# Patient Record
Sex: MALE | Race: WHITE | NOT HISPANIC OR LATINO | Employment: FULL TIME | URBAN - METROPOLITAN AREA
[De-identification: names, ages, dates, MRNs, and addresses within clinical notes are randomized per-mention and may not be internally consistent; named-entity substitution may affect disease eponyms.]

---

## 2017-01-05 ENCOUNTER — HOSPITAL ENCOUNTER (OUTPATIENT)
Dept: ONCOLOGY | Facility: CLINIC | Age: 32
Discharge: HOME | End: 2017-01-05
Attending: INTERNAL MEDICINE | Admitting: INTERNAL MEDICINE

## 2017-01-09 ENCOUNTER — HOSPITAL ENCOUNTER (OUTPATIENT)
Dept: ONCOLOGY | Facility: CLINIC | Age: 32
Discharge: HOME | End: 2017-01-09
Attending: INTERNAL MEDICINE | Admitting: INTERNAL MEDICINE

## 2017-03-17 ENCOUNTER — HOSPITAL ENCOUNTER (OUTPATIENT)
Dept: ONCOLOGY | Facility: CLINIC | Age: 32
Discharge: HOME | End: 2017-03-17
Attending: INTERNAL MEDICINE | Admitting: INTERNAL MEDICINE

## 2017-03-31 ENCOUNTER — ON CAMPUS - OUTPATIENT (AMBULATORY)
Dept: URBAN - METROPOLITAN AREA HOSPITAL 2 | Facility: HOSPITAL | Age: 32
End: 2017-03-31

## 2017-03-31 ENCOUNTER — OFFICE (AMBULATORY)
Dept: URBAN - METROPOLITAN AREA CLINIC 64 | Facility: CLINIC | Age: 32
End: 2017-03-31

## 2017-03-31 VITALS
DIASTOLIC BLOOD PRESSURE: 83 MMHG | DIASTOLIC BLOOD PRESSURE: 79 MMHG | RESPIRATION RATE: 16 BRPM | WEIGHT: 230 LBS | HEART RATE: 82 BPM | OXYGEN SATURATION: 99 % | SYSTOLIC BLOOD PRESSURE: 100 MMHG | SYSTOLIC BLOOD PRESSURE: 125 MMHG | DIASTOLIC BLOOD PRESSURE: 80 MMHG | OXYGEN SATURATION: 94 % | RESPIRATION RATE: 15 BRPM | HEART RATE: 93 BPM | SYSTOLIC BLOOD PRESSURE: 113 MMHG | SYSTOLIC BLOOD PRESSURE: 110 MMHG | DIASTOLIC BLOOD PRESSURE: 73 MMHG | HEART RATE: 88 BPM | HEART RATE: 78 BPM | OXYGEN SATURATION: 93 % | HEIGHT: 74 IN | SYSTOLIC BLOOD PRESSURE: 116 MMHG | HEART RATE: 97 BPM | HEART RATE: 102 BPM | DIASTOLIC BLOOD PRESSURE: 85 MMHG | SYSTOLIC BLOOD PRESSURE: 108 MMHG | RESPIRATION RATE: 18 BRPM | HEART RATE: 101 BPM | DIASTOLIC BLOOD PRESSURE: 68 MMHG | OXYGEN SATURATION: 95 % | OXYGEN SATURATION: 98 % | OXYGEN SATURATION: 96 %

## 2017-03-31 DIAGNOSIS — D12.4 BENIGN NEOPLASM OF DESCENDING COLON: ICD-10-CM

## 2017-03-31 DIAGNOSIS — R10.32 LEFT LOWER QUADRANT PAIN: ICD-10-CM

## 2017-03-31 DIAGNOSIS — K63.5 POLYP OF COLON: ICD-10-CM

## 2017-03-31 DIAGNOSIS — K59.1 FUNCTIONAL DIARRHEA: ICD-10-CM

## 2017-03-31 DIAGNOSIS — K64.8 OTHER HEMORRHOIDS: ICD-10-CM

## 2017-03-31 PROBLEM — D12.5 BENIGN NEOPLASM OF SIGMOID COLON: Status: ACTIVE | Noted: 2017-03-31

## 2017-03-31 LAB
GI HISTOLOGY: A. UNSPECIFIED: (no result)
GI HISTOLOGY: B. UNSPECIFIED: (no result)
GI HISTOLOGY: C. UNSPECIFIED: (no result)
GI HISTOLOGY: PDF REPORT: (no result)

## 2017-03-31 PROCEDURE — 45385 COLONOSCOPY W/LESION REMOVAL: CPT | Performed by: INTERNAL MEDICINE

## 2017-03-31 PROCEDURE — 88305 TISSUE EXAM BY PATHOLOGIST: CPT | Performed by: INTERNAL MEDICINE

## 2017-03-31 PROCEDURE — 45380 COLONOSCOPY AND BIOPSY: CPT | Mod: 59 | Performed by: INTERNAL MEDICINE

## 2017-03-31 RX ADMIN — PROPOFOL: 10 INJECTION, EMULSION INTRAVENOUS at 13:17

## 2017-05-12 ENCOUNTER — HOSPITAL ENCOUNTER (OUTPATIENT)
Dept: ONCOLOGY | Facility: CLINIC | Age: 32
Discharge: HOME | End: 2017-05-12
Attending: INTERNAL MEDICINE | Admitting: INTERNAL MEDICINE

## 2017-08-10 ENCOUNTER — HOSPITAL ENCOUNTER (OUTPATIENT)
Dept: OTHER | Facility: HOSPITAL | Age: 32
Setting detail: SPECIMEN
Discharge: HOME | End: 2017-08-10
Attending: FAMILY MEDICINE | Admitting: FAMILY MEDICINE

## 2017-08-11 LAB
HIV1+2 AB SERPL QL IA: NORMAL
HSV1 IGG SER IA-ACNC: NEGATIVE

## 2017-09-08 ENCOUNTER — OFFICE (AMBULATORY)
Dept: URBAN - METROPOLITAN AREA CLINIC 64 | Facility: CLINIC | Age: 32
End: 2017-09-08

## 2017-09-08 VITALS
DIASTOLIC BLOOD PRESSURE: 97 MMHG | HEIGHT: 74 IN | HEART RATE: 86 BPM | SYSTOLIC BLOOD PRESSURE: 138 MMHG | WEIGHT: 240 LBS

## 2017-09-08 DIAGNOSIS — R10.12 LEFT UPPER QUADRANT PAIN: ICD-10-CM

## 2017-09-08 DIAGNOSIS — R19.4 CHANGE IN BOWEL HABIT: ICD-10-CM

## 2017-09-08 DIAGNOSIS — K21.9 GASTRO-ESOPHAGEAL REFLUX DISEASE WITHOUT ESOPHAGITIS: ICD-10-CM

## 2017-09-08 LAB
AMYLASE, SERUM: 46 U/L (ref 31–124)
COMP. METABOLIC PANEL (14): A/G RATIO: 1.3 (ref 1.2–2.2)
COMP. METABOLIC PANEL (14): ALBUMIN, SERUM: 4.6 G/DL (ref 3.5–5.5)
COMP. METABOLIC PANEL (14): ALKALINE PHOSPHATASE, S: 90 IU/L (ref 39–117)
COMP. METABOLIC PANEL (14): ALT (SGPT): 89 IU/L — HIGH (ref 0–44)
COMP. METABOLIC PANEL (14): AST (SGOT): 62 IU/L — HIGH (ref 0–40)
COMP. METABOLIC PANEL (14): BILIRUBIN, TOTAL: 0.8 MG/DL (ref 0–1.2)
COMP. METABOLIC PANEL (14): BUN/CREATININE RATIO: 14 (ref 9–20)
COMP. METABOLIC PANEL (14): BUN: 13 MG/DL (ref 6–20)
COMP. METABOLIC PANEL (14): CALCIUM, SERUM: 9.7 MG/DL (ref 8.7–10.2)
COMP. METABOLIC PANEL (14): CARBON DIOXIDE, TOTAL: 20 MMOL/L (ref 18–29)
COMP. METABOLIC PANEL (14): CHLORIDE, SERUM: 99 MMOL/L (ref 96–106)
COMP. METABOLIC PANEL (14): CREATININE, SERUM: 0.91 MG/DL (ref 0.76–1.27)
COMP. METABOLIC PANEL (14): EGFR IF AFRICN AM: 128 ML/MIN/1.73 (ref 59–?)
COMP. METABOLIC PANEL (14): EGFR IF NONAFRICN AM: 111 ML/MIN/1.73 (ref 59–?)
COMP. METABOLIC PANEL (14): GLOBULIN, TOTAL: 3.5 G/DL (ref 1.5–4.5)
COMP. METABOLIC PANEL (14): GLUCOSE, SERUM: 107 MG/DL — HIGH (ref 65–99)
COMP. METABOLIC PANEL (14): POTASSIUM, SERUM: 4.3 MMOL/L (ref 3.5–5.2)
COMP. METABOLIC PANEL (14): PROTEIN, TOTAL, SERUM: 8.1 G/DL (ref 6–8.5)
COMP. METABOLIC PANEL (14): SODIUM, SERUM: 141 MMOL/L (ref 134–144)
LIPASE, SERUM: 37 U/L (ref 0–59)

## 2017-09-08 PROCEDURE — 99214 OFFICE O/P EST MOD 30 MIN: CPT | Performed by: INTERNAL MEDICINE

## 2017-09-08 RX ORDER — OMEPRAZOLE 40 MG/1
CAPSULE, DELAYED RELEASE ORAL
Qty: 90 | Refills: 3 | Status: COMPLETED
End: 2023-08-22

## 2017-09-15 ENCOUNTER — CLINICAL SUPPORT (OUTPATIENT)
Dept: ONCOLOGY | Facility: HOSPITAL | Age: 32
End: 2017-09-15

## 2017-09-15 ENCOUNTER — HOSPITAL ENCOUNTER (OUTPATIENT)
Dept: ONCOLOGY | Facility: CLINIC | Age: 32
Setting detail: INFUSION SERIES
Discharge: HOME | End: 2017-09-15
Attending: INTERNAL MEDICINE | Admitting: INTERNAL MEDICINE

## 2017-09-15 ENCOUNTER — HOSPITAL ENCOUNTER (OUTPATIENT)
Dept: ONCOLOGY | Facility: HOSPITAL | Age: 32
Discharge: HOME | End: 2017-09-15
Attending: INTERNAL MEDICINE | Admitting: INTERNAL MEDICINE

## 2017-09-15 NOTE — PROGRESS NOTES
PATIENTS ONCOLOGY RECORD LOCATED IN [facility]      Subjective     Name:  SHEBA NIXON     Date:  09/15/2017  Address:  [address] [place] [address]  Home: [phone]  :  1985 AGE:  32 y.o.        RECORDS OBTAINED:  Patients Oncology Record is located in [facility] [[email or web address]]

## 2017-12-06 ENCOUNTER — HOSPITAL ENCOUNTER (OUTPATIENT)
Dept: PREOP | Facility: HOSPITAL | Age: 32
Setting detail: HOSPITAL OUTPATIENT SURGERY
Discharge: HOME | End: 2017-12-06
Attending: INTERNAL MEDICINE | Admitting: INTERNAL MEDICINE

## 2017-12-06 ENCOUNTER — ON CAMPUS - OUTPATIENT (AMBULATORY)
Dept: URBAN - METROPOLITAN AREA HOSPITAL 85 | Facility: HOSPITAL | Age: 32
End: 2017-12-06
Payer: COMMERCIAL

## 2017-12-06 DIAGNOSIS — R10.12 LEFT UPPER QUADRANT PAIN: ICD-10-CM

## 2017-12-06 DIAGNOSIS — K22.70 BARRETT'S ESOPHAGUS WITHOUT DYSPLASIA: ICD-10-CM

## 2017-12-06 DIAGNOSIS — K29.60 OTHER GASTRITIS WITHOUT BLEEDING: ICD-10-CM

## 2017-12-06 PROCEDURE — 43239 EGD BIOPSY SINGLE/MULTIPLE: CPT | Performed by: INTERNAL MEDICINE

## 2017-12-11 ENCOUNTER — OFFICE (AMBULATORY)
Dept: URBAN - METROPOLITAN AREA CLINIC 64 | Facility: CLINIC | Age: 32
End: 2017-12-11
Payer: COMMERCIAL

## 2017-12-11 VITALS
HEART RATE: 80 BPM | DIASTOLIC BLOOD PRESSURE: 86 MMHG | WEIGHT: 242 LBS | SYSTOLIC BLOOD PRESSURE: 132 MMHG | HEIGHT: 74 IN

## 2017-12-11 DIAGNOSIS — R94.5 ABNORMAL RESULTS OF LIVER FUNCTION STUDIES: ICD-10-CM

## 2017-12-11 DIAGNOSIS — K22.70 BARRETT'S ESOPHAGUS WITHOUT DYSPLASIA: ICD-10-CM

## 2017-12-11 LAB
ACTIN (SMOOTH MUSCLE) ANTIBODY: 7 UNITS (ref 0–19)
ALPHA-1-ANTITRYPSIN PHENOTYP: ALPHA-1-ANTITRYPSIN, SERUM: 148 MG/DL (ref 90–200)
ALPHA-1-ANTITRYPSIN PHENOTYP: PHENOTYPE (PI): (no result)
ANTINUCLEAR ANTIBODIES, IFA: NEGATIVE
CERULOPLASMIN: 26.5 MG/DL (ref 16–31)
FERRITIN, SERUM: 43 NG/ML (ref 30–400)
HEPATITIS PANEL (4): HBSAG SCREEN: NEGATIVE
HEPATITIS PANEL (4): HEP A AB, IGM: NEGATIVE
HEPATITIS PANEL (4): HEP B CORE AB, IGM: NEGATIVE
HEPATITIS PANEL (4): HEP C VIRUS AB: <0.1 S/CO RATIO
IRON AND TIBC: IRON BIND.CAP.(TIBC): 289 UG/DL (ref 250–450)
IRON AND TIBC: IRON SATURATION: 33 % (ref 15–55)
IRON AND TIBC: IRON, SERUM: 94 UG/DL (ref 38–169)
IRON AND TIBC: UIBC: 195 UG/DL (ref 111–343)
MITOCHONDRIAL (M2) ANTIBODY: <20 UNITS

## 2017-12-11 PROCEDURE — 99214 OFFICE O/P EST MOD 30 MIN: CPT | Performed by: INTERNAL MEDICINE

## 2018-02-14 ENCOUNTER — HOSPITAL ENCOUNTER (OUTPATIENT)
Dept: FAMILY MEDICINE CLINIC | Facility: CLINIC | Age: 33
Setting detail: SPECIMEN
Discharge: HOME | End: 2018-02-14
Attending: FAMILY MEDICINE | Admitting: FAMILY MEDICINE

## 2018-02-14 LAB
ALBUMIN SERPL-MCNC: 4.3 G/DL (ref 3.5–4.8)
ALBUMIN/GLOB SERPL: 1 {RATIO} (ref 1–1.7)
ALP SERPL-CCNC: 67 IU/L (ref 32–91)
ALT SERPL-CCNC: 93 IU/L (ref 17–63)
ANION GAP SERPL CALC-SCNC: 10 MMOL/L (ref 10–20)
AST SERPL-CCNC: 50 IU/L (ref 15–41)
BASOPHILS # BLD AUTO: 0 10*3/UL (ref 0–0.2)
BASOPHILS NFR BLD AUTO: 1 % (ref 0–2)
BILIRUB SERPL-MCNC: 0.8 MG/DL (ref 0.3–1.2)
BUN SERPL-MCNC: 9 MG/DL (ref 8–20)
BUN/CREAT SERPL: 10 (ref 6.2–20.3)
CALCIUM SERPL-MCNC: 9.5 MG/DL (ref 8.9–10.3)
CHLORIDE SERPL-SCNC: 103 MMOL/L (ref 101–111)
CHOLEST SERPL-MCNC: 164 MG/DL
CHOLEST/HDLC SERPL: 5 {RATIO}
CONV CO2: 26 MMOL/L (ref 22–32)
CONV LDL CHOLESTEROL DIRECT: 96 MG/DL (ref 0–100)
CONV TOTAL PROTEIN: 8.6 G/DL (ref 6.1–7.9)
CREAT UR-MCNC: 0.9 MG/DL (ref 0.7–1.2)
DIFFERENTIAL METHOD BLD: (no result)
EOSINOPHIL # BLD AUTO: 0.1 10*3/UL (ref 0–0.3)
EOSINOPHIL # BLD AUTO: 1 % (ref 0–3)
ERYTHROCYTE [DISTWIDTH] IN BLOOD BY AUTOMATED COUNT: 13.8 % (ref 11.5–14.5)
GLOBULIN UR ELPH-MCNC: 4.3 G/DL (ref 2.5–3.8)
GLUCOSE SERPL-MCNC: 73 MG/DL (ref 65–99)
HCT VFR BLD AUTO: 47.2 % (ref 40–54)
HDLC SERPL-MCNC: 33 MG/DL
HGB BLD-MCNC: 16.5 G/DL (ref 14–18)
LDLC/HDLC SERPL: 2.9 {RATIO}
LIPID INTERPRETATION: ABNORMAL
LYMPHOCYTES # BLD AUTO: 2.6 10*3/UL (ref 0.8–4.8)
LYMPHOCYTES NFR BLD AUTO: 28 % (ref 18–42)
MCH RBC QN AUTO: 34.1 PG (ref 26–32)
MCHC RBC AUTO-ENTMCNC: 35 G/DL (ref 32–36)
MCV RBC AUTO: 97.4 FL (ref 80–94)
MONOCYTES # BLD AUTO: 0.9 10*3/UL (ref 0.1–1.3)
MONOCYTES NFR BLD AUTO: 9 % (ref 2–11)
NEUTROPHILS # BLD AUTO: 5.6 10*3/UL (ref 2.3–8.6)
NEUTROPHILS NFR BLD AUTO: 61 % (ref 50–75)
NRBC BLD AUTO-RTO: 0 /100{WBCS}
NRBC/RBC NFR BLD MANUAL: 0 10*3/UL
PLATELET # BLD AUTO: 207 10*3/UL (ref 150–450)
PMV BLD AUTO: 9.8 FL (ref 7.4–10.4)
POTASSIUM SERPL-SCNC: 4 MMOL/L (ref 3.6–5.1)
RBC # BLD AUTO: 4.84 10*6/UL (ref 4.6–6)
SODIUM SERPL-SCNC: 135 MMOL/L (ref 136–144)
TRIGL SERPL-MCNC: 270 MG/DL
VLDLC SERPL CALC-MCNC: 35.6 MG/DL
WBC # BLD AUTO: 9.2 10*3/UL (ref 4.5–11.5)

## 2018-02-23 ENCOUNTER — OFFICE (AMBULATORY)
Dept: URBAN - METROPOLITAN AREA LAB 2 | Facility: LAB | Age: 33
End: 2018-02-23

## 2018-02-23 ENCOUNTER — OFFICE (AMBULATORY)
Dept: URBAN - METROPOLITAN AREA CLINIC 64 | Facility: CLINIC | Age: 33
End: 2018-02-23

## 2018-02-23 VITALS
SYSTOLIC BLOOD PRESSURE: 135 MMHG | HEART RATE: 94 BPM | WEIGHT: 245 LBS | HEIGHT: 74 IN | DIASTOLIC BLOOD PRESSURE: 88 MMHG

## 2018-02-23 DIAGNOSIS — K59.1 FUNCTIONAL DIARRHEA: ICD-10-CM

## 2018-02-23 DIAGNOSIS — R94.5 ABNORMAL RESULTS OF LIVER FUNCTION STUDIES: ICD-10-CM

## 2018-02-23 DIAGNOSIS — R19.7 DIARRHEA, UNSPECIFIED: ICD-10-CM

## 2018-02-23 DIAGNOSIS — R10.12 LEFT UPPER QUADRANT PAIN: ICD-10-CM

## 2018-02-23 DIAGNOSIS — A04.0 ENTEROPATHOGENIC ESCHERICHIA COLI INFECTION: ICD-10-CM

## 2018-02-23 LAB
HEPATIC FUNCTION PANEL (7): ALBUMIN, SERUM: 4.3 G/DL (ref 3.5–5.5)
HEPATIC FUNCTION PANEL (7): ALKALINE PHOSPHATASE, S: 73 IU/L (ref 39–117)
HEPATIC FUNCTION PANEL (7): ALT (SGPT): 72 IU/L — HIGH (ref 0–44)
HEPATIC FUNCTION PANEL (7): AST (SGOT): 33 IU/L (ref 0–40)
HEPATIC FUNCTION PANEL (7): BILIRUBIN, DIRECT: 0.19 MG/DL (ref 0–0.4)
HEPATIC FUNCTION PANEL (7): BILIRUBIN, TOTAL: 0.6 MG/DL (ref 0–1.2)
HEPATIC FUNCTION PANEL (7): PROTEIN, TOTAL, SERUM: 8 G/DL (ref 6–8.5)

## 2018-02-23 PROCEDURE — 87507 IADNA-DNA/RNA PROBE TQ 12-25: CPT | Performed by: INTERNAL MEDICINE

## 2018-02-23 PROCEDURE — 99214 OFFICE O/P EST MOD 30 MIN: CPT | Performed by: INTERNAL MEDICINE

## 2018-02-23 RX ORDER — HYOSCYAMINE SULFATE 0.12 MG/1
0.5 TABLET ORAL; SUBLINGUAL
Qty: 30 | Refills: 5 | Status: COMPLETED
Start: 2018-02-23 | End: 2018-08-13

## 2018-03-12 ENCOUNTER — HOSPITAL ENCOUNTER (OUTPATIENT)
Dept: CT IMAGING | Facility: HOSPITAL | Age: 33
Discharge: HOME | End: 2018-03-12
Attending: FAMILY MEDICINE | Admitting: FAMILY MEDICINE

## 2018-03-21 ENCOUNTER — HOSPITAL ENCOUNTER (OUTPATIENT)
Dept: MRI IMAGING | Facility: HOSPITAL | Age: 33
Discharge: HOME | End: 2018-03-21
Attending: NEUROLOGICAL SURGERY | Admitting: NEUROLOGICAL SURGERY

## 2018-05-16 ENCOUNTER — OFFICE (AMBULATORY)
Dept: URBAN - METROPOLITAN AREA CLINIC 64 | Facility: CLINIC | Age: 33
End: 2018-05-16

## 2018-05-16 VITALS
HEIGHT: 74 IN | HEART RATE: 95 BPM | WEIGHT: 238 LBS | DIASTOLIC BLOOD PRESSURE: 74 MMHG | SYSTOLIC BLOOD PRESSURE: 120 MMHG

## 2018-05-16 DIAGNOSIS — K59.1 FUNCTIONAL DIARRHEA: ICD-10-CM

## 2018-05-16 DIAGNOSIS — R94.5 ABNORMAL RESULTS OF LIVER FUNCTION STUDIES: ICD-10-CM

## 2018-05-16 PROCEDURE — 99214 OFFICE O/P EST MOD 30 MIN: CPT | Performed by: INTERNAL MEDICINE

## 2018-05-16 RX ORDER — COLESTIPOL HYDROCHLORIDE 1 G/1
TABLET, FILM COATED ORAL
Qty: 180 | Refills: 3 | Status: COMPLETED
End: 2022-06-27

## 2018-05-17 ENCOUNTER — HOSPITAL ENCOUNTER (OUTPATIENT)
Dept: ONCOLOGY | Facility: CLINIC | Age: 33
Setting detail: INFUSION SERIES
Discharge: HOME | End: 2018-05-17
Attending: INTERNAL MEDICINE | Admitting: INTERNAL MEDICINE

## 2018-05-17 ENCOUNTER — CLINICAL SUPPORT (OUTPATIENT)
Dept: ONCOLOGY | Facility: HOSPITAL | Age: 33
End: 2018-05-17

## 2018-05-17 NOTE — PROGRESS NOTES
PATIENTS ONCOLOGY RECORD LOCATED IN [facility]      Subjective     Name:  SHEBA NIXON     Date:  2018  Address:  [address] [place] [address]  Home: [phone]  :  1985 AGE:  33 y.o.        RECORDS OBTAINED:  Patients Oncology Record is located in [facility]

## 2018-08-13 ENCOUNTER — OFFICE (AMBULATORY)
Dept: URBAN - METROPOLITAN AREA CLINIC 64 | Facility: CLINIC | Age: 33
End: 2018-08-13

## 2018-08-13 VITALS
HEART RATE: 102 BPM | HEIGHT: 74 IN | SYSTOLIC BLOOD PRESSURE: 122 MMHG | DIASTOLIC BLOOD PRESSURE: 90 MMHG | WEIGHT: 236 LBS

## 2018-08-13 DIAGNOSIS — R94.5 ABNORMAL RESULTS OF LIVER FUNCTION STUDIES: ICD-10-CM

## 2018-08-13 DIAGNOSIS — K59.1 FUNCTIONAL DIARRHEA: ICD-10-CM

## 2018-08-13 PROCEDURE — 99213 OFFICE O/P EST LOW 20 MIN: CPT | Performed by: INTERNAL MEDICINE

## 2018-08-13 NOTE — SERVICEHPINOTES
This is a 34 y/o WM who presents for f/u.He was noted to have abnormal LFT on routine lab work. Serologies were neg, including viral hep panel. H/o ETOH at times. No family h/o liver disease. He takes meds for HA, has migraines.  He got Hepatitis A vaccine.  NO RUQ pain, no jaundice. He has a h/o diarrhea. Biofire showed +EPEC E.coli. He was prescribed a course of azithromycin for the E.coli and reports improvement (Levaquin allergy). He reports alternating constipation and diarrhea.  He can't find any specific pattern or trigger. He has tried probiotics which didn't help. He has also tried cholestyramine which didn't help, so he quit taking it. Colestipol was added last ov. He reports diarrhea has improved since starting colestipol. He has only had one episode of diarrhea in 3 months.  No recent constipation. No blood in stool. Labs:BR5/18 - Celiac panel neg.  Repeat viral hep panel sent in error LFT's were ordered.  Viral hep neg. BR2/18 - ALT 72 otw normal LFTBR2/18 - Biofire +EPEC E.myupJG63/17 - hepatic serologies neg. BR9/17 - AST 62, ALT 89, otw normal CMP, zoila/lipaseRUQ USN 9/17 - normalEGD 12/217 - 3cm BE non dysplastic, antral gastritis neg h.pylori. Recall 2020.BRColonoscopy 3/17 - 2 polyps (TA, HP), normal to TI, int hems. Random colon bx's neg microscopic colitis. Recall colon 2022.

## 2018-10-02 ENCOUNTER — HOSPITAL ENCOUNTER (OUTPATIENT)
Dept: MRI IMAGING | Facility: HOSPITAL | Age: 33
Discharge: HOME | End: 2018-10-02
Attending: PHYSICIAN ASSISTANT | Admitting: PHYSICIAN ASSISTANT

## 2018-11-26 PROBLEM — R10.32 ABDOMINAL PAIN - LLQ: Status: ACTIVE | Noted: 2017-03-31

## 2019-04-09 ENCOUNTER — HOSPITAL ENCOUNTER (OUTPATIENT)
Dept: MRI IMAGING | Facility: HOSPITAL | Age: 34
Discharge: HOME | End: 2019-04-09
Attending: NEUROLOGICAL SURGERY | Admitting: NEUROLOGICAL SURGERY

## 2019-04-25 ENCOUNTER — HOSPITAL ENCOUNTER (OUTPATIENT)
Dept: FAMILY MEDICINE CLINIC | Facility: CLINIC | Age: 34
Setting detail: SPECIMEN
Discharge: HOME | End: 2019-04-25
Attending: PHYSICIAN ASSISTANT | Admitting: PHYSICIAN ASSISTANT

## 2019-04-25 LAB
CHOLEST SERPL-MCNC: 223 MG/DL
CHOLEST/HDLC SERPL: 6.8 {RATIO}
CONV LDL CHOLESTEROL DIRECT: 153 MG/DL (ref 0–100)
GLUCOSE SERPL-MCNC: 100 MG/DL (ref 65–99)
HDLC SERPL-MCNC: 33 MG/DL
LDLC/HDLC SERPL: 4.7 {RATIO}
LIPID INTERPRETATION: ABNORMAL
TRIGL SERPL-MCNC: 218 MG/DL
VLDLC SERPL CALC-MCNC: 37.5 MG/DL

## 2019-06-13 ENCOUNTER — HOSPITAL ENCOUNTER (OUTPATIENT)
Dept: LAB | Facility: HOSPITAL | Age: 34
Setting detail: SPECIMEN
Discharge: HOME | End: 2019-06-13
Attending: INTERNAL MEDICINE | Admitting: INTERNAL MEDICINE

## 2019-06-13 LAB
ALBUMIN SERPL-MCNC: 4.4 G/DL (ref 3.5–4.8)
ALBUMIN/GLOB SERPL: 1.1 {RATIO} (ref 1–1.7)
ALP SERPL-CCNC: 60 IU/L (ref 32–91)
ALT SERPL-CCNC: 46 IU/L (ref 17–63)
ANION GAP SERPL CALC-SCNC: 12.6 MMOL/L (ref 10–20)
AST SERPL-CCNC: 33 IU/L (ref 15–41)
BASOPHILS # BLD AUTO: 0 10*3/UL (ref 0–0.2)
BASOPHILS NFR BLD AUTO: 1 % (ref 0–2)
BILIRUB SERPL-MCNC: 1.1 MG/DL (ref 0.3–1.2)
BUN SERPL-MCNC: 13 MG/DL (ref 8–20)
BUN/CREAT SERPL: 11.8 (ref 6.2–20.3)
CALCIUM SERPL-MCNC: 9.3 MG/DL (ref 8.9–10.3)
CHLORIDE SERPL-SCNC: 103 MMOL/L (ref 101–111)
CONV CO2: 24 MMOL/L (ref 22–32)
CONV TOTAL PROTEIN: 8.5 G/DL (ref 6.1–7.9)
CREAT UR-MCNC: 1.1 MG/DL (ref 0.7–1.2)
DIFFERENTIAL METHOD BLD: (no result)
EOSINOPHIL # BLD AUTO: 0 10*3/UL (ref 0–0.3)
EOSINOPHIL # BLD AUTO: 1 % (ref 0–3)
ERYTHROCYTE [DISTWIDTH] IN BLOOD BY AUTOMATED COUNT: 13.9 % (ref 11.5–14.5)
FSH SERPL-ACNC: 4.3 MIU/ML
GLOBULIN UR ELPH-MCNC: 4.1 G/DL (ref 2.5–3.8)
GLUCOSE SERPL-MCNC: 71 MG/DL (ref 65–99)
HBA1C MFR BLD: 4.6 % (ref 0–5.6)
HCT VFR BLD AUTO: 44.6 % (ref 40–54)
HGB BLD-MCNC: 15.9 G/DL (ref 14–18)
LH SERPL-ACNC: 4.3 MIU/ML
LYMPHOCYTES # BLD AUTO: 2.1 10*3/UL (ref 0.8–4.8)
LYMPHOCYTES NFR BLD AUTO: 32 % (ref 18–42)
MCH RBC QN AUTO: 34.2 PG (ref 26–32)
MCHC RBC AUTO-ENTMCNC: 35.6 G/DL (ref 32–36)
MCV RBC AUTO: 96 FL (ref 80–94)
MONOCYTES # BLD AUTO: 0.5 10*3/UL (ref 0.1–1.3)
MONOCYTES NFR BLD AUTO: 8 % (ref 2–11)
NEUTROPHILS # BLD AUTO: 3.9 10*3/UL (ref 2.3–8.6)
NEUTROPHILS NFR BLD AUTO: 58 % (ref 50–75)
NRBC BLD AUTO-RTO: 0 /100{WBCS}
NRBC/RBC NFR BLD MANUAL: 0 10*3/UL
PLATELET # BLD AUTO: 199 10*3/UL (ref 150–450)
PMV BLD AUTO: 9.6 FL (ref 7.4–10.4)
POTASSIUM SERPL-SCNC: 3.6 MMOL/L (ref 3.6–5.1)
PROLACTIN SERPL-MCNC: 8.3 NG/ML (ref 2.6–13)
RBC # BLD AUTO: 4.64 10*6/UL (ref 4.6–6)
SODIUM SERPL-SCNC: 136 MMOL/L (ref 136–144)
T4 FREE SERPL-MCNC: 0.57 NG/DL (ref 0.58–1.64)
TSH SERPL-ACNC: 0.5 UIU/ML (ref 0.34–5.6)
VIT B12 SERPL-MCNC: 319 PG/ML (ref 180–914)
WBC # BLD AUTO: 6.6 10*3/UL (ref 4.5–11.5)

## 2019-06-15 LAB — SHBG SERPL-SCNC: 30 NMOL/L (ref 10–50)

## 2019-06-17 LAB
TESTOST FREE SERPL-MCNC: 60.9 PG/ML (ref 35–155)
TESTOST SERPL-MCNC: 429 NG/DL (ref 250–1100)

## 2019-07-29 ENCOUNTER — TELEPHONE (OUTPATIENT)
Dept: ENDOCRINOLOGY | Facility: CLINIC | Age: 34
End: 2019-07-29

## 2019-10-18 ENCOUNTER — OFFICE (AMBULATORY)
Dept: URBAN - METROPOLITAN AREA CLINIC 64 | Facility: CLINIC | Age: 34
End: 2019-10-18
Payer: COMMERCIAL

## 2019-10-18 VITALS
HEART RATE: 71 BPM | WEIGHT: 210 LBS | HEIGHT: 74 IN | DIASTOLIC BLOOD PRESSURE: 86 MMHG | SYSTOLIC BLOOD PRESSURE: 128 MMHG

## 2019-10-18 DIAGNOSIS — K59.1 FUNCTIONAL DIARRHEA: ICD-10-CM

## 2019-10-18 DIAGNOSIS — K22.70 BARRETT'S ESOPHAGUS WITHOUT DYSPLASIA: ICD-10-CM

## 2019-10-18 DIAGNOSIS — R94.5 ABNORMAL RESULTS OF LIVER FUNCTION STUDIES: ICD-10-CM

## 2019-10-18 PROCEDURE — 99213 OFFICE O/P EST LOW 20 MIN: CPT | Performed by: INTERNAL MEDICINE

## 2019-10-18 RX ORDER — OMEPRAZOLE 40 MG/1
CAPSULE, DELAYED RELEASE ORAL
Qty: 90 | Refills: 3 | Status: COMPLETED
End: 2023-08-22

## 2019-10-18 RX ORDER — COLESTIPOL HYDROCHLORIDE 1 G/1
TABLET, FILM COATED ORAL
Qty: 180 | Refills: 3 | Status: COMPLETED
End: 2022-06-27

## 2019-10-18 RX ORDER — COLESTIPOL HYDROCHLORIDE 1 G/1
2 TABLET, FILM COATED ORAL
Qty: 180 | Refills: 3 | Status: COMPLETED
Start: 2019-10-18 | End: 2020-02-25

## 2019-10-30 PROBLEM — Z80.1 FAMILY HISTORY OF MALIGNANT NEOPLASM OF TRACHEA, BRONCHUS AND LUNG: Status: ACTIVE | Noted: 2019-10-30

## 2019-10-30 PROBLEM — R53.83 MALAISE AND FATIGUE: Status: ACTIVE | Noted: 2019-04-25

## 2019-10-30 PROBLEM — M25.511 PAIN IN JOINT OF RIGHT SHOULDER: Status: ACTIVE | Noted: 2018-08-10

## 2019-10-30 PROBLEM — Z83.3 FAMILY HISTORY OF DIABETES MELLITUS: Status: ACTIVE | Noted: 2019-10-30

## 2019-10-30 PROBLEM — E16.2 HYPOGLYCEMIA: Status: ACTIVE | Noted: 2019-10-30

## 2019-10-30 PROBLEM — R53.81 MALAISE AND FATIGUE: Status: ACTIVE | Noted: 2019-04-25

## 2019-10-30 PROBLEM — Z23 NEED FOR OTHER PROPHYLACTIC VACCINATION AND INOCULATION AGAINST SINGLE DISEASES: Status: ACTIVE | Noted: 2018-02-14

## 2019-10-30 PROBLEM — Z00.00 PREVENTATIVE HEALTH CARE: Status: ACTIVE | Noted: 2018-02-14

## 2019-10-30 PROBLEM — N52.9 IMPOTENCE: Status: ACTIVE | Noted: 2019-06-10

## 2019-10-30 PROBLEM — K21.9 GASTROESOPHAGEAL REFLUX DISEASE: Status: ACTIVE | Noted: 2019-04-25

## 2019-10-30 PROBLEM — E78.5 HYPERLIPIDEMIA: Status: ACTIVE | Noted: 2019-04-25

## 2019-10-30 PROBLEM — G93.0 ARACHNOID CYST: Status: ACTIVE | Noted: 2018-03-12

## 2019-10-30 RX ORDER — DIPHENHYDRAMINE HCL 25 MG
25 TABLET ORAL EVERY 6 HOURS PRN
COMMUNITY
Start: 2019-06-10

## 2019-10-30 RX ORDER — IBUPROFEN 800 MG/1
800 TABLET ORAL EVERY 8 HOURS
COMMUNITY
Start: 2018-09-28

## 2019-10-30 RX ORDER — MONTELUKAST SODIUM 4 MG/1
1 TABLET, CHEWABLE ORAL DAILY
COMMUNITY
Start: 2018-06-21

## 2019-10-30 RX ORDER — OMEPRAZOLE 40 MG/1
40 CAPSULE, DELAYED RELEASE ORAL DAILY
COMMUNITY
Start: 2017-10-18

## 2019-11-05 ENCOUNTER — OFFICE VISIT (OUTPATIENT)
Dept: FAMILY MEDICINE CLINIC | Facility: CLINIC | Age: 34
End: 2019-11-05

## 2019-11-05 ENCOUNTER — HOSPITAL ENCOUNTER (OUTPATIENT)
Dept: GENERAL RADIOLOGY | Facility: HOSPITAL | Age: 34
Discharge: HOME | End: 2019-11-05
Admitting: PHYSICIAN ASSISTANT

## 2019-11-05 VITALS
TEMPERATURE: 98.3 F | BODY MASS INDEX: 26.31 KG/M2 | DIASTOLIC BLOOD PRESSURE: 84 MMHG | SYSTOLIC BLOOD PRESSURE: 149 MMHG | HEART RATE: 68 BPM | OXYGEN SATURATION: 98 % | HEIGHT: 74 IN | WEIGHT: 205 LBS

## 2019-11-05 DIAGNOSIS — D22.9 ATYPICAL NEVI: Primary | ICD-10-CM

## 2019-11-05 DIAGNOSIS — R63.4 UNINTENTIONAL WEIGHT LOSS OF MORE THAN 10 POUNDS IN 90 DAYS: ICD-10-CM

## 2019-11-05 PROBLEM — H04.209 EPIPHORA: Status: ACTIVE | Noted: 2019-11-05

## 2019-11-05 PROBLEM — H52.13 MYOPIA, BILATERAL: Status: ACTIVE | Noted: 2019-11-05

## 2019-11-05 PROBLEM — H52.223 REGULAR ASTIGMATISM, BILATERAL: Status: ACTIVE | Noted: 2019-11-05

## 2019-11-05 PROBLEM — M25.511 PAIN IN JOINT OF RIGHT SHOULDER: Status: RESOLVED | Noted: 2018-08-10 | Resolved: 2019-11-05

## 2019-11-05 PROBLEM — G43.101 MIGRAINE WITH AURA AND WITH STATUS MIGRAINOSUS, NOT INTRACTABLE: Status: ACTIVE | Noted: 2019-11-05

## 2019-11-05 LAB
ALBUMIN SERPL-MCNC: 4.9 G/DL (ref 3.5–5.2)
ALBUMIN/GLOB SERPL: 1.4 G/DL
ALP SERPL-CCNC: 55 U/L (ref 39–117)
ALT SERPL W P-5'-P-CCNC: 30 U/L (ref 1–41)
ANION GAP SERPL CALCULATED.3IONS-SCNC: 11.7 MMOL/L (ref 5–15)
AST SERPL-CCNC: 19 U/L (ref 1–40)
BASOPHILS # BLD AUTO: 0.04 10*3/MM3 (ref 0–0.2)
BASOPHILS NFR BLD AUTO: 0.6 % (ref 0–1.5)
BILIRUB BLD-MCNC: NEGATIVE MG/DL
BILIRUB SERPL-MCNC: 0.7 MG/DL (ref 0.2–1.2)
BUN BLD-MCNC: 10 MG/DL (ref 6–20)
BUN/CREAT SERPL: 9 (ref 7–25)
CALCIUM SPEC-SCNC: 9.7 MG/DL (ref 8.6–10.5)
CHLORIDE SERPL-SCNC: 97 MMOL/L (ref 98–107)
CLARITY, POC: CLEAR
CO2 SERPL-SCNC: 26.3 MMOL/L (ref 22–29)
COLOR UR: YELLOW
CREAT BLD-MCNC: 1.11 MG/DL (ref 0.76–1.27)
DEPRECATED RDW RBC AUTO: 43.6 FL (ref 37–54)
EOSINOPHIL # BLD AUTO: 0.05 10*3/MM3 (ref 0–0.4)
EOSINOPHIL NFR BLD AUTO: 0.7 % (ref 0.3–6.2)
ERYTHROCYTE [DISTWIDTH] IN BLOOD BY AUTOMATED COUNT: 12.2 % (ref 12.3–15.4)
ERYTHROCYTE [SEDIMENTATION RATE] IN BLOOD: 3 MM/HR (ref 0–15)
GFR SERPL CREATININE-BSD FRML MDRD: 76 ML/MIN/1.73
GLOBULIN UR ELPH-MCNC: 3.5 GM/DL
GLUCOSE BLD-MCNC: 81 MG/DL (ref 65–99)
GLUCOSE UR STRIP-MCNC: ABNORMAL MG/DL
HBA1C MFR BLD: 4.3 % (ref 3.5–5.6)
HCT VFR BLD AUTO: 42.2 % (ref 37.5–51)
HGB BLD-MCNC: 14.9 G/DL (ref 13–17.7)
IMM GRANULOCYTES # BLD AUTO: 0.01 10*3/MM3 (ref 0–0.05)
IMM GRANULOCYTES NFR BLD AUTO: 0.1 % (ref 0–0.5)
KETONES UR QL: NEGATIVE
LEUKOCYTE EST, POC: NEGATIVE
LYMPHOCYTES # BLD AUTO: 2.24 10*3/MM3 (ref 0.7–3.1)
LYMPHOCYTES NFR BLD AUTO: 32.8 % (ref 19.6–45.3)
MCH RBC QN AUTO: 34.3 PG (ref 26.6–33)
MCHC RBC AUTO-ENTMCNC: 35.3 G/DL (ref 31.5–35.7)
MCV RBC AUTO: 97 FL (ref 79–97)
MONOCYTES # BLD AUTO: 0.52 10*3/MM3 (ref 0.1–0.9)
MONOCYTES NFR BLD AUTO: 7.6 % (ref 5–12)
NEUTROPHILS # BLD AUTO: 3.96 10*3/MM3 (ref 1.7–7)
NEUTROPHILS NFR BLD AUTO: 58.2 % (ref 42.7–76)
NITRITE UR-MCNC: NEGATIVE MG/ML
NRBC BLD AUTO-RTO: 0 /100 WBC (ref 0–0.2)
PH UR: 6 [PH] (ref 5–8)
PLATELET # BLD AUTO: 191 10*3/MM3 (ref 140–450)
PMV BLD AUTO: 11.4 FL (ref 6–12)
POTASSIUM BLD-SCNC: 3.9 MMOL/L (ref 3.5–5.2)
PROT SERPL-MCNC: 8.4 G/DL (ref 6–8.5)
PROT UR STRIP-MCNC: NEGATIVE MG/DL
RBC # BLD AUTO: 4.35 10*6/MM3 (ref 4.14–5.8)
RBC # UR STRIP: NEGATIVE /UL
SODIUM BLD-SCNC: 135 MMOL/L (ref 136–145)
SP GR UR: 1.01 (ref 1–1.03)
UROBILINOGEN UR QL: NORMAL
WBC NRBC COR # BLD: 6.82 10*3/MM3 (ref 3.4–10.8)

## 2019-11-05 PROCEDURE — 71046 X-RAY EXAM CHEST 2 VIEWS: CPT

## 2019-11-05 PROCEDURE — 99214 OFFICE O/P EST MOD 30 MIN: CPT | Performed by: PHYSICIAN ASSISTANT

## 2019-11-05 PROCEDURE — 80053 COMPREHEN METABOLIC PANEL: CPT | Performed by: PHYSICIAN ASSISTANT

## 2019-11-05 PROCEDURE — 83036 HEMOGLOBIN GLYCOSYLATED A1C: CPT | Performed by: PHYSICIAN ASSISTANT

## 2019-11-05 PROCEDURE — 81003 URINALYSIS AUTO W/O SCOPE: CPT | Performed by: PHYSICIAN ASSISTANT

## 2019-11-05 PROCEDURE — 85025 COMPLETE CBC W/AUTO DIFF WBC: CPT | Performed by: PHYSICIAN ASSISTANT

## 2019-11-05 PROCEDURE — 36415 COLL VENOUS BLD VENIPUNCTURE: CPT | Performed by: PHYSICIAN ASSISTANT

## 2019-11-05 PROCEDURE — 84165 PROTEIN E-PHORESIS SERUM: CPT | Performed by: PHYSICIAN ASSISTANT

## 2019-11-05 PROCEDURE — 85652 RBC SED RATE AUTOMATED: CPT | Performed by: PHYSICIAN ASSISTANT

## 2019-11-05 NOTE — PROGRESS NOTES
Subjective  Eleazar Jeong is a 34 y.o. male     History of Present Illness  Patient is a 34-year-old white male complaining of multiple things:    1.  Moles: Patient states he would like moles removed, 2 days left upper arm, 1 to the left upper thigh, 2 on his chest, and 2 on his back.  He states that they are bothering him and may be changing.    2.  Unintentional weight loss: Patient states that he has had unintentional weight loss, 25 pounds in 1 month without trying.  He also complains of feeling hot all the time, excessive urination, loss of appetite, diffuse abdominal pain, not sleeping, etc.  He has previously been to referred to endocrinology and urology for this.  Last time he saw endocrinology was in June of this year.  Labs were drawn which did not account for his symptoms.  He states he would like further work-up for this as he feels like something is wrong.    The following portions of the patient's history were reviewed and updated as appropriate: allergies, current medications, past family history, past medical history, past social history, past surgical history and problem list.    Review of Systems   Constitutional: Positive for fatigue and fever.   Respiratory: Negative for shortness of breath.    Cardiovascular: Negative for chest pain.   Gastrointestinal: Positive for abdominal pain.   Endocrine: Positive for polydipsia and polyuria.   Genitourinary: Positive for nocturia.   Skin: Positive for skin lesions.   Psychiatric/Behavioral: Positive for sleep disturbance.       Objective  Physical Exam   Constitutional: He is oriented to person, place, and time. He appears well-developed and well-nourished.   HENT:   Head: Normocephalic and atraumatic.   Right Ear: External ear normal.   Left Ear: External ear normal.   Nose: Nose normal.   Mouth/Throat: Oropharynx is clear and moist.   Eyes: Conjunctivae are normal. Pupils are equal, round, and reactive to light.   Neck: Normal range of motion. Neck  "supple.   Cardiovascular: Normal rate, regular rhythm and normal heart sounds.   Pulmonary/Chest: Effort normal and breath sounds normal.   Abdominal: Soft. Bowel sounds are normal. He exhibits no distension, no pulsatile liver, no fluid wave, no abdominal bruit, no ascites, no pulsatile midline mass and no mass. There is no hepatosplenomegaly, splenomegaly or hepatomegaly. There is generalized tenderness.       Musculoskeletal: Normal range of motion.   Neurological: He is alert and oriented to person, place, and time.   Psychiatric: He has a normal mood and affect. His behavior is normal.       Vitals:    11/05/19 0915   BP: 149/84   BP Location: Right arm   Patient Position: Sitting   Cuff Size: Adult   Pulse: 68   Temp: 98.3 °F (36.8 °C)   TempSrc: Oral   SpO2: 98%   Weight: 93 kg (205 lb)   Height: 188 cm (74\")     Body mass index is 26.32 kg/m².    PHQ-9 Total Score: 0    Brief Urine Lab Results  (Last result in the past 365 days)      Color   Clarity   Blood   Leuk Est   Nitrite   Protein   CREAT   Urine HCG        11/05/19 1107 Yellow Clear Negative Negative Negative Negative               Assessment/Plan  Diagnoses and all orders for this visit:    1. Atypical nevi (Primary)  Comments:  Referral to Derm for removal.  Orders:  -     Ambulatory Referral to Dermatology    2. Unintentional weight loss of more than 10 pounds in 90 days  Comments:  labs today, CXR.  Patient to follow-up with endocrinology.  Orders:  -     CBC & Differential  -     Comprehensive Metabolic Panel  -     Sedimentation Rate  -     Cancel: TSH  -     Hemoglobin A1c  -     XR Chest PA & Lateral  -     POCT urinalysis dipstick, automated  -     Protein Elec + Interp, Serum  -     CBC Auto Differential              "

## 2019-11-06 LAB
ALBUMIN SERPL-MCNC: 4.2 G/DL (ref 2.9–4.4)
ALBUMIN/GLOB SERPL: 1.1 {RATIO} (ref 0.7–1.7)
ALPHA1 GLOB FLD ELPH-MCNC: 0.2 G/DL (ref 0–0.4)
ALPHA2 GLOB SERPL ELPH-MCNC: 0.6 G/DL (ref 0.4–1)
B-GLOBULIN SERPL ELPH-MCNC: 0.9 G/DL (ref 0.7–1.3)
GAMMA GLOB SERPL ELPH-MCNC: 2.1 G/DL (ref 0.4–1.8)
GLOBULIN SER CALC-MCNC: 3.8 G/DL (ref 2.2–3.9)
Lab: ABNORMAL
M-SPIKE: 1.8 G/DL
PROT PATTERN SERPL ELPH-IMP: ABNORMAL
PROT SERPL-MCNC: 8 G/DL (ref 6–8.5)

## 2019-11-07 ENCOUNTER — TELEPHONE (OUTPATIENT)
Dept: FAMILY MEDICINE CLINIC | Facility: CLINIC | Age: 34
End: 2019-11-07

## 2019-11-07 DIAGNOSIS — D47.2 MONOCLONAL GAMMOPATHY: Primary | ICD-10-CM

## 2019-11-08 ENCOUNTER — LAB (OUTPATIENT)
Dept: LAB | Facility: HOSPITAL | Age: 34
End: 2019-11-08

## 2019-11-08 ENCOUNTER — OFFICE VISIT (OUTPATIENT)
Dept: ENDOCRINOLOGY | Facility: CLINIC | Age: 34
End: 2019-11-08

## 2019-11-08 VITALS
SYSTOLIC BLOOD PRESSURE: 112 MMHG | OXYGEN SATURATION: 99 % | HEIGHT: 74 IN | HEART RATE: 77 BPM | BODY MASS INDEX: 26.05 KG/M2 | WEIGHT: 203 LBS | DIASTOLIC BLOOD PRESSURE: 75 MMHG

## 2019-11-08 DIAGNOSIS — R53.81 MALAISE AND FATIGUE: Primary | ICD-10-CM

## 2019-11-08 DIAGNOSIS — R53.83 MALAISE AND FATIGUE: Primary | ICD-10-CM

## 2019-11-08 DIAGNOSIS — R53.83 MALAISE AND FATIGUE: ICD-10-CM

## 2019-11-08 DIAGNOSIS — R63.4 WEIGHT LOSS: ICD-10-CM

## 2019-11-08 DIAGNOSIS — R53.81 MALAISE AND FATIGUE: ICD-10-CM

## 2019-11-08 LAB
ALBUMIN SERPL-MCNC: 4.3 G/DL (ref 3.5–5.2)
ALBUMIN/GLOB SERPL: 1 G/DL
ALP SERPL-CCNC: 52 U/L (ref 39–117)
ALT SERPL W P-5'-P-CCNC: 36 U/L (ref 1–41)
ANION GAP SERPL CALCULATED.3IONS-SCNC: 11.3 MMOL/L (ref 5–15)
AST SERPL-CCNC: 46 U/L (ref 1–40)
BASOPHILS # BLD AUTO: 0.05 10*3/MM3 (ref 0–0.2)
BASOPHILS NFR BLD AUTO: 0.6 % (ref 0–1.5)
BILIRUB SERPL-MCNC: 0.8 MG/DL (ref 0.2–1.2)
BUN BLD-MCNC: 12 MG/DL (ref 6–20)
BUN/CREAT SERPL: 11.9 (ref 7–25)
CALCIUM SPEC-SCNC: 9.4 MG/DL (ref 8.6–10.5)
CHLORIDE SERPL-SCNC: 100 MMOL/L (ref 98–107)
CO2 SERPL-SCNC: 26.7 MMOL/L (ref 22–29)
CORTIS SERPL-MCNC: 9.22 MCG/DL
CREAT BLD-MCNC: 1.01 MG/DL (ref 0.76–1.27)
DEPRECATED RDW RBC AUTO: 43.8 FL (ref 37–54)
EOSINOPHIL # BLD AUTO: 0.05 10*3/MM3 (ref 0–0.4)
EOSINOPHIL NFR BLD AUTO: 0.6 % (ref 0.3–6.2)
ERYTHROCYTE [DISTWIDTH] IN BLOOD BY AUTOMATED COUNT: 12.3 % (ref 12.3–15.4)
GFR SERPL CREATININE-BSD FRML MDRD: 85 ML/MIN/1.73
GLOBULIN UR ELPH-MCNC: 4.4 GM/DL
GLUCOSE BLD-MCNC: 83 MG/DL (ref 65–99)
HCT VFR BLD AUTO: 44.5 % (ref 37.5–51)
HGB BLD-MCNC: 15.3 G/DL (ref 13–17.7)
IMM GRANULOCYTES # BLD AUTO: 0.02 10*3/MM3 (ref 0–0.05)
IMM GRANULOCYTES NFR BLD AUTO: 0.3 % (ref 0–0.5)
LYMPHOCYTES # BLD AUTO: 1.92 10*3/MM3 (ref 0.7–3.1)
LYMPHOCYTES NFR BLD AUTO: 24.1 % (ref 19.6–45.3)
MCH RBC QN AUTO: 33.3 PG (ref 26.6–33)
MCHC RBC AUTO-ENTMCNC: 34.4 G/DL (ref 31.5–35.7)
MCV RBC AUTO: 96.7 FL (ref 79–97)
MONOCYTES # BLD AUTO: 0.53 10*3/MM3 (ref 0.1–0.9)
MONOCYTES NFR BLD AUTO: 6.6 % (ref 5–12)
NEUTROPHILS # BLD AUTO: 5.4 10*3/MM3 (ref 1.7–7)
NEUTROPHILS NFR BLD AUTO: 67.8 % (ref 42.7–76)
NRBC BLD AUTO-RTO: 0 /100 WBC (ref 0–0.2)
PLATELET # BLD AUTO: 185 10*3/MM3 (ref 140–450)
PMV BLD AUTO: 11.7 FL (ref 6–12)
POTASSIUM BLD-SCNC: 3.9 MMOL/L (ref 3.5–5.2)
PROT SERPL-MCNC: 8.7 G/DL (ref 6–8.5)
RBC # BLD AUTO: 4.6 10*6/MM3 (ref 4.14–5.8)
SODIUM BLD-SCNC: 138 MMOL/L (ref 136–145)
T4 FREE SERPL-MCNC: 1.18 NG/DL (ref 0.93–1.7)
TSH SERPL DL<=0.05 MIU/L-ACNC: 0.59 UIU/ML (ref 0.27–4.2)
WBC NRBC COR # BLD: 7.97 10*3/MM3 (ref 3.4–10.8)

## 2019-11-08 PROCEDURE — 84439 ASSAY OF FREE THYROXINE: CPT

## 2019-11-08 PROCEDURE — 36415 COLL VENOUS BLD VENIPUNCTURE: CPT

## 2019-11-08 PROCEDURE — 82533 TOTAL CORTISOL: CPT

## 2019-11-08 PROCEDURE — 80053 COMPREHEN METABOLIC PANEL: CPT

## 2019-11-08 PROCEDURE — 82024 ASSAY OF ACTH: CPT

## 2019-11-08 PROCEDURE — 99213 OFFICE O/P EST LOW 20 MIN: CPT | Performed by: INTERNAL MEDICINE

## 2019-11-08 PROCEDURE — 84443 ASSAY THYROID STIM HORMONE: CPT

## 2019-11-08 PROCEDURE — 85025 COMPLETE CBC W/AUTO DIFF WBC: CPT

## 2019-11-08 NOTE — PROGRESS NOTES
Endocrine Progress Note Outpatient     Patient Care Team:  Tierra Payan PA as PCP - General (Physician Assistant)    Chief Complaint: Follow-up fatigue and tiredness    HPI: 34-year-old male with no significant past medical history has been having fatigue and tiredness and has lost over 30 pounds in last 1 month is here for follow-up.  He was initially seen here in June 2019 for possible high cortisol level and the labs were ordered however cortisol was not drawn and his other labs were okay.    Back with weight loss of 30 pounds in the of September without any intention.  Does admit fatigue and nausea.  Also have some dizziness.    All data reviewed: Labs from June 2019 showed a total testosterone of 429, free testosterone 60.9, A1c 4.6, I will LH 4.3, FSH 4.3, sodium 136, potassium 3.6, chloride 103, CO 24, glucose 71, BUN 13, creatinine 1.1, sex hormone binding globin 30, TSH 0.5, free T4 0.57, prolactin 8.3 and B12 319.    Past Medical History:   Diagnosis Date   • Colon polyp    • Depression    • Erectile dysfunction    • Headache    • Low back pain    • Pneumonia    • Tremor    • Visual impairment        Social History     Socioeconomic History   • Marital status: Single     Spouse name: Not on file   • Number of children: Not on file   • Years of education: Not on file   • Highest education level: Not on file   Tobacco Use   • Smoking status: Current Every Day Smoker     Types: Cigarettes, Electronic Cigarette, Cigars   • Smokeless tobacco: Never Used   Substance and Sexual Activity   • Alcohol use: No     Frequency: Never   • Drug use: No   • Sexual activity: Not Currently       Family History   Problem Relation Age of Onset   • Diabetes Mother    • Vision loss Mother    • COPD Paternal Aunt    • Vision loss Paternal Aunt    • Diabetes Maternal Grandmother    • Vision loss Maternal Grandmother    • Heart disease Paternal Grandmother    • Stroke Paternal Grandmother    • Heart disease Paternal Grandfather     • Vision loss Paternal Grandfather    • Vision loss Father    • Vision loss Sister        Allergies   Allergen Reactions   • Amoxicillin Unknown (See Comments)   • Erythromycin Hives   • Levofloxacin Hives   • Penicillin G Unknown (See Comments)   • Penicillins Other (See Comments)       ROS:   Constitutional:  Admit fatigue, tiredness.    Eyes:  Denies change in visual acuity   HENT:  Denies nasal congestion or sore throat   Respiratory: denies cough, admit shortness of breath.   Cardiovascular:  denies chest pain, edema   GI:  Denies abdominal pain, admit nausea, vomiting.   Musculoskeletal:  Denies back pain or joint pain   Integument:  Denies dry skin and rash   Neurologic:  Denies headache, focal weakness or sensory changes   Endocrine:  Denies polyuria or polydipsia   Psychiatric:  Denies depression, admit anxiety      Vitals:    11/08/19 0822   BP: 112/75   Pulse: 77   SpO2: 99%   Laying: Pulse 66/min with blood pressure 112/80  Sitting pulse 77/min with blood pressure 122/75  Standing pulse 86/min with blood pressure 125/80.    Physical Exam:  GEN: NAD, conversant  EYES: EOMI, PERRL, no conjunctival erythema  NECK: no thyromegaly, full ROM   CV: RRR, no murmurs/rubs/gallops, no peripheral edema  LUNG: CTAB, no wheezes/rales/ronchi  SKIN: no rashes, no acanthosis  MSK: no deformities, full ROM of all extremities  NEURO: no tremors, DTR normal  PSYCH: AOX3, appropriate mood, affect normal      Results Review:     I reviewed the patient's new clinical results.    Lab Results   Component Value Date    HGBA1C 4.3 11/05/2019    HGBA1C 4.6 06/13/2019      Lab Results   Component Value Date    GLUCOSE 81 11/05/2019    BUN 10 11/05/2019    CREATININE 1.11 11/05/2019    EGFRIFNONA 76 11/05/2019    BCR 9.0 11/05/2019    K 3.9 11/05/2019    CO2 26.3 11/05/2019    CALCIUM 9.7 11/05/2019    PROTENTOTREF 8.0 11/05/2019    ALBUMIN 4.90 11/05/2019    ALBUMIN 4.2 11/05/2019    LABIL2 1.1 11/05/2019    AST 19 11/05/2019     ALT 30 11/05/2019    CHOL 223 (H) 04/25/2019    TRIG 218 (H) 04/25/2019     (H) 04/25/2019    HDL 33 (L) 04/25/2019     Lab Results   Component Value Date    TSH 0.50 06/13/2019    FREET4 0.57 (L) 06/13/2019         Medication Review: Reviewed.       Current Outpatient Medications:   •  colestipol (COLESTID) 1 g tablet, Daily., Disp: , Rfl:   •  diphenhydrAMINE (BENADRYL ALLERGY) 25 MG tablet, BENADRYL 25MG, Disp: , Rfl:   •  ibuprofen (ADVIL,MOTRIN) 800 MG tablet, Every 8 (Eight) Hours., Disp: , Rfl:   •  Loperamide HCl (IMODIUM PO), IMODIUM, Disp: , Rfl:   •  omeprazole (priLOSEC) 40 MG capsule, OMEPRAZOLE 40 MG CPDR, Disp: , Rfl:       Assessment/Plan   Fatigue with weight loss: Uncontrolled, Etiology unclear at this time.  I will check CBC, CMP, TSH, free T4, a.m. cortisol and ACTH level.  Also advised to follow with your family doctor because you may need further testing            Daphney Laurent MD FACE.    Much of the above report is an electronic transcription/translation of the spoken language to printed text using Dragon Software. As such, the subtleties and finesse of the spoken language may permit erroneous, or at times, nonsensical words or phrases to be inadvertently transcribed; thus changes may be made at a later date to rectify these errors.

## 2019-11-11 LAB — ACTH PLAS-MCNC: 19.8 PG/ML (ref 7.2–63.3)

## 2019-11-15 ENCOUNTER — TELEPHONE (OUTPATIENT)
Dept: FAMILY MEDICINE CLINIC | Facility: CLINIC | Age: 34
End: 2019-11-15

## 2019-11-18 ENCOUNTER — TELEPHONE (OUTPATIENT)
Dept: FAMILY MEDICINE CLINIC | Facility: CLINIC | Age: 34
End: 2019-11-18

## 2019-11-18 PROCEDURE — 86334 IMMUNOFIX E-PHORESIS SERUM: CPT | Performed by: PHYSICIAN ASSISTANT

## 2019-11-18 PROCEDURE — 82784 ASSAY IGA/IGD/IGG/IGM EACH: CPT | Performed by: PHYSICIAN ASSISTANT

## 2019-11-18 PROCEDURE — 36415 COLL VENOUS BLD VENIPUNCTURE: CPT | Performed by: PHYSICIAN ASSISTANT

## 2019-11-19 LAB
IGA SERPL-MCNC: 116 MG/DL (ref 90–386)
IGG SERPL-MCNC: 2206 MG/DL (ref 700–1600)
IGM SERPL-MCNC: 55 MG/DL (ref 20–172)
PROT PATTERN SERPL IFE-IMP: ABNORMAL

## 2019-11-20 DIAGNOSIS — D47.2 MONOCLONAL GAMMOPATHY: Primary | ICD-10-CM

## 2019-11-25 ENCOUNTER — TELEPHONE (OUTPATIENT)
Dept: ONCOLOGY | Facility: CLINIC | Age: 34
End: 2019-11-25

## 2019-11-25 NOTE — TELEPHONE ENCOUNTER
Mr. Jeong left message regarding scheduling appt w/ Dr. Preciado tomorrow and to have blood work requested from PCP's office.  Returned call, left message to call back.

## 2019-11-27 ENCOUNTER — TELEPHONE (OUTPATIENT)
Dept: ONCOLOGY | Facility: CLINIC | Age: 34
End: 2019-11-27

## 2019-11-27 NOTE — TELEPHONE ENCOUNTER
Mr. Jeong left another message regarding scheduling appt.  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 Appt.

## 2019-12-05 RX ORDER — SILDENAFIL CITRATE 20 MG/1
20 TABLET ORAL DAILY PRN
Refills: 99 | COMMUNITY
Start: 2019-11-05

## 2019-12-13 ENCOUNTER — OFFICE VISIT (OUTPATIENT)
Dept: LAB | Facility: HOSPITAL | Age: 34
End: 2019-12-13

## 2019-12-13 ENCOUNTER — RESULTS ENCOUNTER (OUTPATIENT)
Dept: ONCOLOGY | Facility: CLINIC | Age: 34
End: 2019-12-13

## 2019-12-13 ENCOUNTER — OFFICE VISIT (OUTPATIENT)
Dept: ENDOCRINOLOGY | Facility: CLINIC | Age: 34
End: 2019-12-13

## 2019-12-13 ENCOUNTER — OFFICE VISIT (OUTPATIENT)
Dept: ONCOLOGY | Facility: CLINIC | Age: 34
End: 2019-12-13

## 2019-12-13 VITALS
WEIGHT: 112 LBS | SYSTOLIC BLOOD PRESSURE: 130 MMHG | BODY MASS INDEX: 14.37 KG/M2 | HEIGHT: 74 IN | HEART RATE: 74 BPM | OXYGEN SATURATION: 99 % | DIASTOLIC BLOOD PRESSURE: 80 MMHG

## 2019-12-13 VITALS
HEART RATE: 75 BPM | HEIGHT: 74 IN | TEMPERATURE: 98 F | SYSTOLIC BLOOD PRESSURE: 113 MMHG | RESPIRATION RATE: 18 BRPM | DIASTOLIC BLOOD PRESSURE: 72 MMHG | BODY MASS INDEX: 27.28 KG/M2 | WEIGHT: 212.6 LBS

## 2019-12-13 DIAGNOSIS — R53.83 MALAISE AND FATIGUE: ICD-10-CM

## 2019-12-13 DIAGNOSIS — R63.4 WEIGHT LOSS: Primary | ICD-10-CM

## 2019-12-13 DIAGNOSIS — D47.2 MONOCLONAL GAMMOPATHY: ICD-10-CM

## 2019-12-13 DIAGNOSIS — R53.81 MALAISE AND FATIGUE: ICD-10-CM

## 2019-12-13 DIAGNOSIS — D75.1 POLYCYTHEMIA: ICD-10-CM

## 2019-12-13 DIAGNOSIS — D75.1 POLYCYTHEMIA: Primary | ICD-10-CM

## 2019-12-13 LAB
ALBUMIN SERPL-MCNC: 4.3 G/DL (ref 3.5–5.2)
ALBUMIN/GLOB SERPL: 1.1 G/DL
ALP SERPL-CCNC: 53 U/L (ref 39–117)
ALT SERPL W P-5'-P-CCNC: 70 U/L (ref 1–41)
ANION GAP SERPL CALCULATED.3IONS-SCNC: 11 MMOL/L (ref 5–15)
AST SERPL-CCNC: 143 U/L (ref 1–40)
BASOPHILS # BLD AUTO: 0.04 10*3/MM3 (ref 0–0.2)
BASOPHILS NFR BLD AUTO: 0.6 % (ref 0–1.5)
BILIRUB SERPL-MCNC: 0.6 MG/DL (ref 0.2–1.2)
BUN BLD-MCNC: 12 MG/DL (ref 6–20)
BUN/CREAT SERPL: 14.3 (ref 7–25)
CALCIUM SPEC-SCNC: 9.5 MG/DL (ref 8.6–10.5)
CHLORIDE SERPL-SCNC: 100 MMOL/L (ref 98–107)
CO2 SERPL-SCNC: 27 MMOL/L (ref 22–29)
CREAT BLD-MCNC: 0.84 MG/DL (ref 0.76–1.27)
DEPRECATED RDW RBC AUTO: 43.7 FL (ref 37–54)
EOSINOPHIL # BLD AUTO: 0.16 10*3/MM3 (ref 0–0.4)
EOSINOPHIL NFR BLD AUTO: 2.4 % (ref 0.3–6.2)
ERYTHROCYTE [DISTWIDTH] IN BLOOD BY AUTOMATED COUNT: 13.1 % (ref 12.3–15.4)
FERRITIN SERPL-MCNC: 34.35 NG/ML (ref 30–400)
GFR SERPL CREATININE-BSD FRML MDRD: 105 ML/MIN/1.73
GLOBULIN UR ELPH-MCNC: 4 GM/DL
GLUCOSE BLD-MCNC: 91 MG/DL (ref 65–99)
HCT VFR BLD AUTO: 41.4 % (ref 37.5–51)
HGB BLD-MCNC: 14.9 G/DL (ref 13–17.7)
IRON 24H UR-MRATE: 130 MCG/DL (ref 59–158)
IRON SATN MFR SERPL: 38 % (ref 20–50)
LYMPHOCYTES # BLD AUTO: 2.1 10*3/MM3 (ref 0.7–3.1)
LYMPHOCYTES NFR BLD AUTO: 31.3 % (ref 19.6–45.3)
MCH RBC QN AUTO: 33.7 PG (ref 26.6–33)
MCHC RBC AUTO-ENTMCNC: 36 G/DL (ref 31.5–35.7)
MCV RBC AUTO: 93.7 FL (ref 79–97)
MONOCYTES # BLD AUTO: 0.61 10*3/MM3 (ref 0.1–0.9)
MONOCYTES NFR BLD AUTO: 9.1 % (ref 5–12)
NEUTROPHILS # BLD AUTO: 3.81 10*3/MM3 (ref 1.7–7)
NEUTROPHILS NFR BLD AUTO: 56.6 % (ref 42.7–76)
PLATELET # BLD AUTO: 184 10*3/MM3 (ref 140–450)
PMV BLD AUTO: 10.7 FL (ref 6–12)
POTASSIUM BLD-SCNC: 4 MMOL/L (ref 3.5–5.2)
PROT SERPL-MCNC: 8.3 G/DL (ref 6–8.5)
RBC # BLD AUTO: 4.42 10*6/MM3 (ref 4.14–5.8)
SODIUM BLD-SCNC: 138 MMOL/L (ref 136–145)
TIBC SERPL-MCNC: 346 MCG/DL (ref 298–536)
TRANSFERRIN SERPL-MCNC: 232 MG/DL (ref 200–360)
WBC NRBC COR # BLD: 6.72 10*3/MM3 (ref 3.4–10.8)

## 2019-12-13 PROCEDURE — 82728 ASSAY OF FERRITIN: CPT | Performed by: NURSE PRACTITIONER

## 2019-12-13 PROCEDURE — 85025 COMPLETE CBC W/AUTO DIFF WBC: CPT | Performed by: INTERNAL MEDICINE

## 2019-12-13 PROCEDURE — 83540 ASSAY OF IRON: CPT | Performed by: NURSE PRACTITIONER

## 2019-12-13 PROCEDURE — 99212 OFFICE O/P EST SF 10 MIN: CPT | Performed by: INTERNAL MEDICINE

## 2019-12-13 PROCEDURE — 36415 COLL VENOUS BLD VENIPUNCTURE: CPT | Performed by: INTERNAL MEDICINE

## 2019-12-13 PROCEDURE — 99215 OFFICE O/P EST HI 40 MIN: CPT | Performed by: INTERNAL MEDICINE

## 2019-12-13 PROCEDURE — 80053 COMPREHEN METABOLIC PANEL: CPT | Performed by: NURSE PRACTITIONER

## 2019-12-13 PROCEDURE — 84466 ASSAY OF TRANSFERRIN: CPT | Performed by: NURSE PRACTITIONER

## 2019-12-13 NOTE — PROGRESS NOTES
HEMATOLOGY ONCOLOGY FOLLOW UP        Patient name: Eleazar Jeong  : 1985  MRN: [id number]  Primary Care Physician: Tierra Payan PA  Referring Physician: Tierra Payan PA  Reason For Consult:     No chief complaint on file.  Monoclonal gammopathy  History of polycythemia    History of Present Illness:  Mr. Jeong is a 33-year-old male who had lab work done at this   primary care physician, Dr. Ann’s office on 16 that showed hemoglobin of 18.3.  MCV was   95.0.  He is referred to us for polycythemia.  The patient has a history of chronic anxiety and   depression.    · 16 - WBC 9.2, hemoglobin 18.3, MCV 95.0, platelet count 182,000.  Differential normal.    Vitamin B12 554.     Today, 5/10/2016, the patient presents for initial consultation for polycythemia.  He reports   symptoms of occasional headaches.  He does have history of migraines.  He also reports some   nonspecific vision problems.  He also reports weight loss of around 30 pounds during the past six   months.  He denies any fevers or any lymph node enlargement.  He also reports some night sweats.    He does feel tired all the time.  He denies having any snoring, but he lives alone.  He does   report some symptoms of excessive daytime sleepiness.  He smokes about 3-4 cigars per day.  He   denies having a CBC in the past.    · 5/10/16 - Creatinine 0.87.  LFTs normal.  WBC 7.3, hemoglobin 16.7, MCV 94.5, platelet count   189,000.  Erythropoietin level 10.6.  JAK2 V617 mutations negative.  Exon 12 and exon 13 mutation   negative.   mutation negative.    · 16 - Bone marrow biopsy:  Hemodiluted hypocellular aspirate.  However, flow cytometry is   adequate and it does not reveal any abnormal immunophenotype.  Cytogenetics shows normal   karyotype.  The clot section showed a single bone marrow particle.  There was no evidence of   ringed sideroblasts.    · 16 - WBC 8.7, hemoglobin 17.6, MCV 94, platelet  count 166,000.  · 9/1/16 - WBC 9.4, hemoglobin 16.8, MCV 94.9, platelet count 204,000.  Patient underwent   phlebotomy.  · 1/5/17 - Hemoglobin 16.5.  Patient underwent phlebotomy.  · 1/9/17 - WBC 9.8, hemoglobin 15.3, platelet count 195,000.    · 5/12/17 - WBC 6.9, hemoglobin 15.9, platelet count 185,000, MCV 91.3.    · 9/15/17 - WBC 9.4, hemoglobin 16.2, platelet count 201,000, MCV 91.1.   · 12/6/17 - EGD:  Probable Matson’s esophagus. Antral gastritis.  Glandular mucosa with focal   intestinal metaplasia, consistent with Matson’s.    · 3/21/18 - Brain MRI with and without contrast:  A 2.3 cm structure along the superior margin of   the cerebellum at the midline, consistent arachnoid cyst.     March 2018 to December 2019 patient has not followed up in our office.  Patient was referred back by MARTIN Lacey due to IgG kappa monoclonal gammopathy  11/5/2019: Serum protein electrophoresis shows M protein 1.8 g/dL  11/18/2019 IgA 116, IgG 2206 high, IgM 55, serum immunofixation shows IgG kappa monoclonal gammopathy.  12/13/2019 creatinine 0.84, BUN 12,  high, ALT 70, iron 130, ferritin 34.3, iron saturation 38%, TIBC 346  WBC 6.7, hemoglobin 14.9, platelets 184, MCV 93.7        Subjective:12/13/19  Patient continues to smoke.  However his polycythemia has resolved.  He is referred back to 2 IgG kappa monoclonal band which was diagnosed on work-up by his primary care provider Tierra SALAZAR.  He does report infection in the recent past.  Does not report any fractures.  Says he has been having some difficulty gaining weight.  He has been working night shifts.  Does not report any kidney issues.        The following portions of the patient's history were reviewed and updated as appropriate: allergies, current medications, past family history, past medical history, past social history, past surgical history and problem list.         Past Medical History:   Diagnosis Date   • Benign monoclonal gammopathy     • Colon polyp    • Depression    • Erectile dysfunction    • Headache    • Low back pain    • Pneumonia    • Tremor    • Visual impairment        Past Surgical History:   Procedure Laterality Date   • COLONOSCOPY           Current Outpatient Medications:   •  colestipol (COLESTID) 1 g tablet, Daily., Disp: , Rfl:   •  diphenhydrAMINE (BENADRYL ALLERGY) 25 MG tablet, BENADRYL 25MG, Disp: , Rfl:   •  omeprazole (priLOSEC) 40 MG capsule, OMEPRAZOLE 40 MG CPDR, Disp: , Rfl:   •  sildenafil (REVATIO) 20 MG tablet, TAKE UP TO 5 TABLETS BY MOUTH 30 MINUTES PRIOR TO INTERCOURSE, Disp: , Rfl: 99  •  ibuprofen (ADVIL,MOTRIN) 800 MG tablet, Every 8 (Eight) Hours., Disp: , Rfl:   •  Loperamide HCl (IMODIUM PO), IMODIUM, Disp: , Rfl:     Allergies   Allergen Reactions   • Amoxicillin Unknown (See Comments)   • Erythromycin Hives   • Levofloxacin Hives   • Penicillin G Unknown (See Comments)   • Penicillins Other (See Comments)       Family History   Problem Relation Age of Onset   • Diabetes Mother    • Vision loss Mother    • COPD Paternal Aunt    • Vision loss Paternal Aunt    • Diabetes Maternal Grandmother    • Vision loss Maternal Grandmother    • Heart disease Paternal Grandmother    • Stroke Paternal Grandmother    • Heart disease Paternal Grandfather    • Vision loss Paternal Grandfather    • Vision loss Father    • Vision loss Sister        Cancer-related family history is not on file.    Social History     Tobacco Use   • Smoking status: Current Every Day Smoker     Packs/day: 1.00     Types: Cigarettes, Electronic Cigarette, Cigars     Start date: 8/2/2002   • Smokeless tobacco: Never Used   Substance Use Topics   • Alcohol use: No     Frequency: Never   • Drug use: No         I have reviewed the history of present illness, past medical history, family history, social history, lab results, all notes and other records since the patient was last seen on  11/27/2019.    ROS:   Review of Systems   Constitutional:  "Positive for fatigue. Negative for activity change, chills and fever.   HENT: Negative for ear pain, mouth sores, nosebleeds and sore throat.    Eyes: Negative for photophobia and visual disturbance.   Respiratory: Negative for wheezing and stridor.    Cardiovascular: Negative for chest pain and palpitations.   Gastrointestinal: Negative for abdominal pain, diarrhea, nausea and vomiting.   Endocrine: Negative for cold intolerance and heat intolerance.   Genitourinary: Negative for dysuria and hematuria.   Musculoskeletal: Negative for joint swelling and neck stiffness.   Skin: Negative for color change and rash.   Neurological: Negative for seizures and syncope.   Hematological: Negative for adenopathy.        No obvious bleeding   Psychiatric/Behavioral: Negative for agitation, confusion and hallucinations.       Objective:  Vital signs:  Vitals:    12/13/19 1134   BP: 113/72   Pulse: 75   Resp: 18   Temp: 98 °F (36.7 °C)   Weight: 96.4 kg (212 lb 9.6 oz)   Height: 188 cm (74\")   PainSc:   4   PainLoc: Abdomen  Comment: left side.constant.     ECOG  (0) Fully active, able to carry on all predisease performance without restriction    Physical Exam:   Physical Exam   Constitutional: He is oriented to person, place, and time. He appears well-developed and well-nourished. No distress.   HENT:   Head: Normocephalic and atraumatic.   Eyes: Conjunctivae and EOM are normal. Right eye exhibits no discharge. Left eye exhibits no discharge. No scleral icterus.   Neck: Normal range of motion. Neck supple. No thyromegaly present.   Cardiovascular: Normal rate, regular rhythm and normal heart sounds. Exam reveals no gallop and no friction rub.   Pulmonary/Chest: Effort normal. No stridor. No respiratory distress. He has no wheezes.   Slightly decreased air entry bilaterally   Abdominal: Soft. Bowel sounds are normal. He exhibits no mass. There is no tenderness. There is no rebound and no guarding.   Musculoskeletal: Normal range " of motion. He exhibits no tenderness.   Lymphadenopathy:     He has no cervical adenopathy.   Neurological: He is alert and oriented to person, place, and time. He exhibits normal muscle tone.   Skin: Skin is warm. No rash noted. He is not diaphoretic. No erythema.   Psychiatric: He has a normal mood and affect. His behavior is normal.   Nursing note and vitals reviewed.            Lab Results - Last 18 Months   Lab Units 12/13/19  1140 11/08/19  0912 11/05/19  1026   WBC 10*3/mm3 6.72 7.97 6.82   HEMOGLOBIN g/dL 14.9 15.3 14.9   HEMATOCRIT % 41.4 44.5 42.2   PLATELETS 10*3/mm3 184 185 191   MCV fL 93.7 96.7 97.0     Lab Results - Last 18 Months   Lab Units 12/13/19  1424 11/08/19  0912 11/05/19  1026   SODIUM mmol/L 138 138 135*   POTASSIUM mmol/L 4.0 3.9 3.9   CHLORIDE mmol/L 100 100 97*   CO2 mmol/L 27.0 26.7 26.3   BUN mg/dL 12 12 10   CREATININE mg/dL 0.84 1.01 1.11   CALCIUM mg/dL 9.5 9.4 9.7   BILIRUBIN mg/dL 0.6 0.8 0.7   ALK PHOS U/L 53 52 55   ALT (SGPT) U/L 70* 36 30   AST (SGOT) U/L 143* 46* 19   GLUCOSE mg/dL 91 83 81       Lab Results   Component Value Date    GLUCOSE 91 12/13/2019    BUN 12 12/13/2019    CREATININE 0.84 12/13/2019    EGFRIFNONA 105 12/13/2019    BCR 14.3 12/13/2019    K 4.0 12/13/2019    CO2 27.0 12/13/2019    CALCIUM 9.5 12/13/2019    PROTENTOTREF 8.0 11/05/2019    ALBUMIN 4.30 12/13/2019    LABIL2 1.1 11/05/2019     (H) 12/13/2019    ALT 70 (H) 12/13/2019       No results for input(s): APTT, INR, PTT in the last 78996 hours.    Lab Results   Component Value Date    IRON 130 12/13/2019    TIBC 346 12/13/2019    FERRITIN 34.35 12/13/2019       No results found for: FOLATE    No results found for: OCCULTBLD    No results found for: RETICCTPCT    Lab Results   Component Value Date    RDTODKXW00 319 06/13/2019     Lab Results   Component Value Date    SPEP Comment 11/05/2019     No results found for: LDH, URICACID  Lab Results   Component Value Date    SEDRATE 3 11/05/2019     No  results found for: FIBRINOGEN, HAPTOGLOBIN  No results found for: PTT, INR  No results found for:   No results found for: CEA  No components found for: CA-19-9  No results found for: PSA      Assessment/Plan     Assessment:  1.  IgG kappa monoclonal gammopathy: This was diagnosed on lab work done in 11/2019 by his primary care provider.  He does report recent infection.  Does not have any renal failure, bone fractures, anemia etc.  2. History of secondary polycythemia: Seems resolved. Bone marrow biopsy was unremarkable.  JAK2 mutation and EPO   level do not suggest any evidence of polycythemia vera.  He has secondary polycythemia likely due   to tobacco use and from sleep apnea.  The patient continues to smoke.  Patient does not want to   have a sleep study.  He has not required phlebotomy for the last 3 years His hemoglobin has stabilized.    Unfortunately he continues to smoke.      3. History of Matson’s esophagus.  He follows up with Dr. Hernandez.         PLAN:    1. Will need further work-up for the monoclonal gammopathy.  Will order skeletal survey, repeat SPEP immunofixation, urine immunofixation, and will also need a bone marrow biopsy to quantify plasma cell burden and also for risk assessment...  We will schedule bone marrow biopsy through interventional radiology.  2. Discussed with patient that he likely has MGUS and not myeloma.  3. Polycythemia resolved.  4. Smoking cessation counseling provided  5. We will follow patient back in 5 weeks.          I counselannmarie Bush of the risks of continuing to use tobacco and cessation.    During this visit, I spent 3-10 minutes counseling the patient regarding tobacco cessation.         Polycythemia  - CBC Auto Differential  - Comprehensive Metabolic Panel  - Iron Profile  - Ferritin  - [facility] Requisition    Malaise and fatigue  - Comprehensive Metabolic Panel  - Iron Profile  - Ferritin  - [facility] Requisition    Monoclonal gammopathy  - XR Bone Survey  Complete  - [facility] Requisition  - CT Guided Bone Marrow Biopsy And Aspiration    Orders Placed This Encounter   Procedures   • XR Bone Survey Complete     Standing Status:   Future     Standing Expiration Date:   12/13/2020     Order Specific Question:   Reason for Exam:     Answer:   monoclonal gammopathy   • CT Guided Bone Marrow Biopsy And Aspiration     Standing Status:   Future     Standing Expiration Date:   12/13/2020     Order Specific Question:   Reason for Exam:     Answer:   monoclonal gammopathy   • CBC Auto Differential     collect LABS TODAY     Scheduling Instructions:      collect LABS TODAY   • Comprehensive Metabolic Panel     Standing Status:   Future     Number of Occurrences:   1     Standing Expiration Date:   12/13/2020   • Iron Profile     Standing Status:   Future     Number of Occurrences:   1     Standing Expiration Date:   12/13/2020   • Ferritin     Standing Status:   Future     Number of Occurrences:   1     Standing Expiration Date:   12/13/2020   • [facility] Requisition     SPEP, serum immunofixation, free light chains, UPEP, urine immunofixation     Standing Status:   Future     Standing Expiration Date:   12/13/2020                   Thank you very much for providing the opportunity to participate in this patient’s care. Please do not hesitate to call if there are any other questions.    I have reviewed all relevant labs results, outside reports, imaging, notes, vital signs, medications, and plan with the patient today.    Portions of the note have been scribed by medical assistant/Nurse.  I have reviewed and made changes accordingly.

## 2019-12-13 NOTE — PROGRESS NOTES
Endocrine Progress Note Outpatient     Patient Care Team:  Tierra Payan PA as PCP - General (Physician Assistant)    Chief Complaint: Follow-up fatigue and tiredness    HPI: 34-year-old male with no significant past medical history has been having fatigue and tiredness and has lost over 30 pounds in 1 month is here for follow-up.  Since last seen he has gained about 8 pounds of weight.  This week he tells me he does not feel too bad. Does admit fatigue and nausea, dizziness.    All data reviewed:   Labs from November 2019 showed a TSH of 0.59 with free T4 1.18, ACTH level 19.8, cortisol 9.2, sodium 138, potassium 3.9, chloride 100, CO2 26, glucose 83, BUN 12, creatinine 1.02, hemoglobin 15.3, hematocrit 44.5, platelet count is 185 with white count 7.9.    Labs from June 2019 showed a total testosterone of 429, free testosterone 60.9, A1c 4.6, I will LH 4.3, FSH 4.3, sodium 136, potassium 3.6, chloride 103, CO 24, glucose 71, BUN 13, creatinine 1.1, sex hormone binding globin 30, TSH 0.5, free T4 0.57, prolactin 8.3 and B12 319.    Past Medical History:   Diagnosis Date   • Benign monoclonal gammopathy    • Colon polyp    • Depression    • Erectile dysfunction    • Headache    • Low back pain    • Pneumonia    • Tremor    • Visual impairment        Social History     Socioeconomic History   • Marital status: Single     Spouse name: Not on file   • Number of children: Not on file   • Years of education: Not on file   • Highest education level: Not on file   Tobacco Use   • Smoking status: Current Every Day Smoker     Types: Cigarettes, Electronic Cigarette, Cigars   • Smokeless tobacco: Never Used   Substance and Sexual Activity   • Alcohol use: No     Frequency: Never   • Drug use: No   • Sexual activity: Not Currently       Family History   Problem Relation Age of Onset   • Diabetes Mother    • Vision loss Mother    • COPD Paternal Aunt    • Vision loss Paternal Aunt    • Diabetes Maternal Grandmother    •  Vision loss Maternal Grandmother    • Heart disease Paternal Grandmother    • Stroke Paternal Grandmother    • Heart disease Paternal Grandfather    • Vision loss Paternal Grandfather    • Vision loss Father    • Vision loss Sister        Allergies   Allergen Reactions   • Amoxicillin Unknown (See Comments)   • Erythromycin Hives   • Levofloxacin Hives   • Penicillin G Unknown (See Comments)   • Penicillins Other (See Comments)       ROS:   Constitutional:  Admit fatigue, tiredness.    Eyes:  Denies change in visual acuity   HENT:  Denies nasal congestion or sore throat   Respiratory: denies cough, admit shortness of breath.   Cardiovascular:  denies chest pain, edema   GI:  Denies abdominal pain, admit nausea, vomiting.   Musculoskeletal:  Denies back pain or joint pain   Integument:  Denies dry skin and rash   Neurologic:  Denies headache, focal weakness or sensory changes   Endocrine:  Denies polyuria or polydipsia   Psychiatric:  Denies depression, admit anxiety      Vitals:    12/13/19 0922   BP: 130/80   Pulse: 74   SpO2: 99%       Physical Exam:  GEN: NAD, conversant  EYES: EOMI, PERRL, no conjunctival erythema  NECK: no thyromegaly, full ROM   CV: RRR, no murmurs/rubs/gallops, no peripheral edema  LUNG: CTAB, no wheezes/rales/ronchi  SKIN: no rashes, no acanthosis  MSK: no deformities, full ROM of all extremities  NEURO: no tremors, DTR normal  PSYCH: AOX3, appropriate mood, affect normal      Results Review:     I reviewed the patient's new clinical results.    Lab Results   Component Value Date    HGBA1C 4.3 11/05/2019    HGBA1C 4.6 06/13/2019      Lab Results   Component Value Date    GLUCOSE 83 11/08/2019    BUN 12 11/08/2019    CREATININE 1.01 11/08/2019    EGFRIFNONA 85 11/08/2019    BCR 11.9 11/08/2019    K 3.9 11/08/2019    CO2 26.7 11/08/2019    CALCIUM 9.4 11/08/2019    PROTENTOTREF 8.0 11/05/2019    ALBUMIN 4.30 11/08/2019    LABIL2 1.1 11/05/2019    AST 46 (H) 11/08/2019    ALT 36 11/08/2019     CHOL 223 (H) 04/25/2019    TRIG 218 (H) 04/25/2019     (H) 04/25/2019    HDL 33 (L) 04/25/2019     Lab Results   Component Value Date    TSH 0.590 11/08/2019    FREET4 1.18 11/08/2019         Medication Review: Reviewed.       Current Outpatient Medications:   •  colestipol (COLESTID) 1 g tablet, Daily., Disp: , Rfl:   •  diphenhydrAMINE (BENADRYL ALLERGY) 25 MG tablet, BENADRYL 25MG, Disp: , Rfl:   •  ibuprofen (ADVIL,MOTRIN) 800 MG tablet, Every 8 (Eight) Hours., Disp: , Rfl:   •  Loperamide HCl (IMODIUM PO), IMODIUM, Disp: , Rfl:   •  omeprazole (priLOSEC) 40 MG capsule, OMEPRAZOLE 40 MG CPDR, Disp: , Rfl:   •  sildenafil (REVATIO) 20 MG tablet, TAKE UP TO 5 TABLETS BY MOUTH 30 MINUTES PRIOR TO INTERCOURSE, Disp: , Rfl: 99      Assessment/Plan   Fatigue with weight loss: Uncontrolled, Etiology unclear at this time.  CBC, CMP, TSH, free T4, ACTH level and cortisol level were all normal.  He did not have orthostasis on the last visit.  At this time I do not know the etiology of his weight loss.  He has gained about 8 pounds since last visit.  Found to have some blood abnormalities and is scheduled to see an hematologist later today.  Advised to follow-up with his family physician.  I will see him on as-needed basis.            Daphney Laurent MD FACE.    Much of the above report is an electronic transcription/translation of the spoken language to printed text using Dragon Software. As such, the subtleties and finesse of the spoken language may permit erroneous, or at times, nonsensical words or phrases to be inadvertently transcribed; thus changes may be made at a later date to rectify these errors.

## 2019-12-17 ENCOUNTER — TELEPHONE (OUTPATIENT)
Dept: FAMILY MEDICINE CLINIC | Facility: CLINIC | Age: 34
End: 2019-12-17

## 2019-12-17 DIAGNOSIS — R74.8 ELEVATED LIVER ENZYMES: Primary | ICD-10-CM

## 2019-12-17 NOTE — TELEPHONE ENCOUNTER
Looks like patient's blood work came back recently with elevated liver enzymes.  He should have additional laboratory testing done for this reason.  Please call him and advise.  I have ordered the additional testing.  He needs to make a follow-up appointment with me after he has labs drawn.

## 2019-12-18 ENCOUNTER — HOSPITAL ENCOUNTER (OUTPATIENT)
Dept: GENERAL RADIOLOGY | Facility: HOSPITAL | Age: 34
Discharge: HOME | End: 2019-12-18
Admitting: NURSE PRACTITIONER

## 2019-12-18 DIAGNOSIS — D47.2 MONOCLONAL GAMMOPATHY: ICD-10-CM

## 2019-12-18 LAB
ALBUMIN SERPL-MCNC: 4.4 G/DL (ref 3.5–5.2)
ALBUMIN/GLOB SERPL: 1.1 G/DL
ALP SERPL-CCNC: 54 U/L (ref 39–117)
ALT SERPL W P-5'-P-CCNC: 65 U/L (ref 1–41)
ANION GAP SERPL CALCULATED.3IONS-SCNC: 9.8 MMOL/L (ref 5–15)
AST SERPL-CCNC: 57 U/L (ref 1–40)
BILIRUB SERPL-MCNC: 0.5 MG/DL (ref 0.2–1.2)
BUN BLD-MCNC: 14 MG/DL (ref 6–20)
BUN/CREAT SERPL: 17.5 (ref 7–25)
CALCIUM SPEC-SCNC: 9.5 MG/DL (ref 8.6–10.5)
CHLORIDE SERPL-SCNC: 98 MMOL/L (ref 98–107)
CO2 SERPL-SCNC: 26.2 MMOL/L (ref 22–29)
CREAT BLD-MCNC: 0.8 MG/DL (ref 0.76–1.27)
GFR SERPL CREATININE-BSD FRML MDRD: 111 ML/MIN/1.73
GLOBULIN UR ELPH-MCNC: 4.1 GM/DL
GLUCOSE BLD-MCNC: 109 MG/DL (ref 65–99)
HAV IGM SERPL QL IA: NORMAL
HBV CORE IGM SERPL QL IA: NORMAL
HBV SURFACE AG SERPL QL IA: NORMAL
HCV AB SER DONR QL: NORMAL
POTASSIUM BLD-SCNC: 4.2 MMOL/L (ref 3.5–5.2)
PROT SERPL-MCNC: 8.5 G/DL (ref 6–8.5)
SODIUM BLD-SCNC: 134 MMOL/L (ref 136–145)

## 2019-12-18 PROCEDURE — 86645 CMV ANTIBODY IGM: CPT | Performed by: PHYSICIAN ASSISTANT

## 2019-12-18 PROCEDURE — 80053 COMPREHEN METABOLIC PANEL: CPT | Performed by: PHYSICIAN ASSISTANT

## 2019-12-18 PROCEDURE — 86665 EPSTEIN-BARR CAPSID VCA: CPT | Performed by: PHYSICIAN ASSISTANT

## 2019-12-18 PROCEDURE — 77075 RADEX OSSEOUS SURVEY COMPL: CPT

## 2019-12-18 PROCEDURE — 36415 COLL VENOUS BLD VENIPUNCTURE: CPT | Performed by: PHYSICIAN ASSISTANT

## 2019-12-18 PROCEDURE — 80074 ACUTE HEPATITIS PANEL: CPT | Performed by: PHYSICIAN ASSISTANT

## 2019-12-19 LAB
CMV IGM SERPL IA-ACNC: <30 AU/ML (ref 0–29.9)
EBV VCA IGM SER-ACNC: <36 U/ML (ref 0–35.9)

## 2019-12-20 DIAGNOSIS — R63.4 UNINTENTIONAL WEIGHT LOSS: Primary | ICD-10-CM

## 2019-12-26 ENCOUNTER — OFFICE VISIT (OUTPATIENT)
Dept: FAMILY MEDICINE CLINIC | Facility: CLINIC | Age: 34
End: 2019-12-26

## 2019-12-26 ENCOUNTER — TELEPHONE (OUTPATIENT)
Dept: ONCOLOGY | Facility: CLINIC | Age: 34
End: 2019-12-26

## 2019-12-26 VITALS
SYSTOLIC BLOOD PRESSURE: 131 MMHG | HEART RATE: 83 BPM | HEIGHT: 74 IN | OXYGEN SATURATION: 99 % | BODY MASS INDEX: 28.26 KG/M2 | WEIGHT: 220.2 LBS | DIASTOLIC BLOOD PRESSURE: 88 MMHG

## 2019-12-26 DIAGNOSIS — R74.8 ELEVATED LIVER ENZYMES: ICD-10-CM

## 2019-12-26 DIAGNOSIS — R63.4 WEIGHT LOSS: Primary | ICD-10-CM

## 2019-12-26 DIAGNOSIS — D47.2 MONOCLONAL GAMMOPATHY: ICD-10-CM

## 2019-12-26 PROCEDURE — 99214 OFFICE O/P EST MOD 30 MIN: CPT | Performed by: PHYSICIAN ASSISTANT

## 2019-12-26 NOTE — PROGRESS NOTES
"Subjective  Eleazar Jeong is a 34 y.o. male     History of Present Illness  Patient is a 34-year-old white male here for 2-month follow-up on unintentional weight loss and monoclonal gammopathy.  He has been seeing an oncologist, endocrinologist, and gastroenterologist.  His liver enzymes were recently found to be elevated again, I ordered additional laboratory testing which was all negative.  His gastroenterologist is recommending a liver biopsy which I think is a good idea.  Since his last visit patient reports he has gained 15 pounds.  He has been following up with the specialists as directed by them.  A bone survey was done on December 18, 2019 which did not show any abnormality.  A bone marrow biopsy is scheduled, but has not been completed yet.  Patient reports that he stills feels \"off\" and would like for me to continue investigating his symptoms.  He reports drenching night sweats once monthly.    The following portions of the patient's history were reviewed and updated as appropriate: allergies, current medications, past family history, past medical history, past social history, past surgical history and problem list.    Review of Systems   Constitutional: Positive for unexpected weight gain. Negative for chills, diaphoresis, fatigue, fever and unexpected weight loss.   HENT: Negative for sore throat.    Respiratory: Negative for shortness of breath.    Cardiovascular: Negative for chest pain.   Gastrointestinal: Negative for abdominal pain.   Endocrine: Negative for polydipsia and polyphagia.   Neurological: Negative for dizziness, light-headedness and confusion.       Objective  Physical Exam   Constitutional: He is oriented to person, place, and time. He appears well-developed and well-nourished.   HENT:   Head: Normocephalic and atraumatic.   Cardiovascular: Normal rate, regular rhythm and normal heart sounds.   Pulmonary/Chest: Effort normal and breath sounds normal.   Abdominal: Soft. Bowel sounds " "are normal.   Neurological: He is alert and oriented to person, place, and time.   Psychiatric: He has a normal mood and affect. His behavior is normal.       Vitals:    12/26/19 0800   BP: 131/88   BP Location: Left arm   Patient Position: Sitting   Cuff Size: Adult   Pulse: 83   SpO2: 99%   Weight: 99.9 kg (220 lb 3.2 oz)   Height: 188 cm (74\")     Body mass index is 28.27 kg/m².    PHQ-9 Total Score:        Assessment/Plan  Diagnoses and all orders for this visit:    1. Weight loss (Primary)  Comments:  Improved, patient has gained 15 pounds in the last 2 months.  I offered CT scans of the chest and abdomen for further investigation of his symptoms, he wants to discuss with oncology prior to initiating any imaging.    2. Monoclonal gammopathy  Comments:  Patient is to continue following up with oncology as directed by them.    3. Elevated liver enzymes  Comments:  Additional testing thus far is been negative, I recommend he follow-up with GI for liver biopsy.            Answers for HPI/ROS submitted by the patient on 12/24/2019   How long have you been having these symptoms?: Other    "

## 2019-12-26 NOTE — TELEPHONE ENCOUNTER
I had a note on my desk that the patient had came in to the office wanting to know if we were ordering a certain urine test. I called patient back and got his voicemail and left him a messsage letting him know that his PCP has ordered that urine test and he needs to get it done through her.

## 2020-01-06 ENCOUNTER — HOSPITAL ENCOUNTER (OUTPATIENT)
Dept: CT IMAGING | Facility: HOSPITAL | Age: 35
Discharge: HOME | End: 2020-01-06

## 2020-01-14 DIAGNOSIS — D75.1 POLYCYTHEMIA: Primary | ICD-10-CM

## 2020-01-14 NOTE — PROGRESS NOTES
HEMATOLOGY ONCOLOGY FOLLOW UP        Patient name: Eleazar Jeong  : 1985  MRN: [id number]  Primary Care Physician: Tierra Payan PA  Referring Physician: Tierra Payan PA  Reason For Consult:     Chief Complaint   Patient presents with   • Follow-up     Polycythemia   Monoclonal gammopathy  History of polycythemia    History of Present Illness:      1. IgG kappa monoclonal gammopathy:-    2018 to 2019 patient has not followed up in our office.  Patient was referred back by MARTIN Lacey due to IgG kappa monoclonal gammopathy  · 2019: Serum protein electrophoresis shows M protein 1.8 g/dL  · 2019 IgA 116, IgG 2206 high, IgM 55, serum immunofixation shows IgG kappa monoclonal gammopathy.  · 2019 creatinine 0.84, BUN 12,  high, ALT 70, iron 130, ferritin 34.3, iron saturation 38%, TIBC 346  WBC 6.7, hemoglobin 14.9, platelets 184, MCV 93.7   · 2019  SPEP M protein 1.38 free kappa light chains 37.6 high, Urine protein electrophoresis M spike 111.7, M spike to 3.38.,  24-hour urine protein is 260, urine immunofixation shows free kappa paraprotein peak and also a small IgG kappa paraprotein peak.  · 2019 calcium 9.5, creatinine 0.8, ALT 65 high, AST 57 high, hepatitis panel negative,  · 2019 skeletal survey: No evidence of bone lytic lesions.      2.  History of polycythemia:  Patient presented to PCP Dr. Ann’s office on 16 that showed hemoglobin of 18.3.  MCV was  95.0.  He is referred to us for polycythemia.  The patient has a history of chronic anxiety and   depression.   · 16 - WBC 9.2, hemoglobin 18.3, MCV 95.0, platelet count 182,000.  Differential normal.    Vitamin B12 554.     5/10/2016, the patient presents for initial consultation for polycythemia.  He reports symptoms of occasional headaches.  He does have history of migraines.  He also reports some  nonspecific vision problems.  He also reports weight  loss of around 30 pounds during the past six months.  He denies any fevers or any lymph node enlargement.  He also reports some night sweats.  He does feel tired all the time.  He denies having any snoring, but he lives alone.  He does report some symptoms of excessive daytime sleepiness.  He smokes about 3-4 cigars per day.  He  denies having a CBC in the past.    · · 5/10/16 - Creatinine 0.87.  LFTs normal.  WBC 7.3, hemoglobin 16.7, MCV 94.5, platelet count   189,000.  Erythropoietin level 10.6.  JAK2 V617 mutations negative.  Exon 12 and exon 13 mutation   negative.   mutation negative.    ·  6/8/16 - Bone marrow biopsy:  Hemodiluted hypocellular aspirate.  However, flow cytometry is   adequate and it does not reveal any abnormal immunophenotype.  Cytogenetics shows normal   karyotype.  The clot section showed a single bone marrow particle.  There was no evidence of   ringed sideroblasts.    · 6/30/16 - WBC 8.7, hemoglobin 17.6, MCV 94, platelet count 166,000.  · 9/1/16 - WBC 9.4, hemoglobin 16.8, MCV 94.9, platelet count 204,000.  Patient underwent   phlebotomy.  ·  1/5/17 - Hemoglobin 16.5.  Patient underwent phlebotomy.  ·  1/9/17 - WBC 9.8, hemoglobin 15.3, platelet count 195,000.    · 5/12/17 - WBC 6.9, hemoglobin 15.9, platelet count 185,000, MCV 91.3.    · 9/15/17 - WBC 9.4, hemoglobin 16.2, platelet count 201,000, MCV 91.1.   · 12/6/17 - EGD:  Probable Matson’s esophagus. Antral gastritis.  Glandular mucosa with focal intestinal metaplasia, consistent with Matson’s.    ·  3/21/18 - Brain MRI with and without contrast:  A 2.3 cm structure along the superior margin of   the cerebellum at the midline, consistent arachnoid cyst.       Subjective:01/15/2020  Patient is a 34 year old male who presents for a follow up. He continues to smoke. However his polycythemia has resolved. He is scheduled for his Bone Marrow biopsy on Friday. He states it had to be rescheduled due to migraines. He states he  thinks he finally has those under control. He states he had a skeletal survey on 12/18/19. He reports fatigue and abdominal pain.  He has a history of Matson's esophagus.  He denies any dysphagia.   Patient denies any frequent infections.  Continues to smoke.    The following portions of the patient's history were reviewed and updated as appropriate: allergies, current medications, past family history, past medical history, past social history, past surgical history and problem list.         Past Medical History:   Diagnosis Date   • Benign monoclonal gammopathy    • Colon polyp    • Depression    • Erectile dysfunction    • Headache    • Low back pain    • Pneumonia    • Tremor    • Visual impairment        Past Surgical History:   Procedure Laterality Date   • COLONOSCOPY           Current Outpatient Medications:   •  colestipol (COLESTID) 1 g tablet, Daily., Disp: , Rfl:   •  diphenhydrAMINE (BENADRYL ALLERGY) 25 MG tablet, BENADRYL 25MG, Disp: , Rfl:   •  ibuprofen (ADVIL,MOTRIN) 800 MG tablet, Every 8 (Eight) Hours., Disp: , Rfl:   •  Loperamide HCl (IMODIUM PO), IMODIUM, Disp: , Rfl:   •  omeprazole (priLOSEC) 40 MG capsule, OMEPRAZOLE 40 MG CPDR, Disp: , Rfl:   •  sildenafil (REVATIO) 20 MG tablet, TAKE UP TO 5 TABLETS BY MOUTH 30 MINUTES PRIOR TO INTERCOURSE, Disp: , Rfl: 99    Allergies   Allergen Reactions   • Amoxicillin Unknown (See Comments)   • Erythromycin Hives   • Levofloxacin Hives   • Penicillin G Unknown (See Comments)   • Penicillins Other (See Comments)       Family History   Problem Relation Age of Onset   • Diabetes Mother    • Vision loss Mother    • COPD Paternal Aunt    • Vision loss Paternal Aunt    • Diabetes Maternal Grandmother    • Vision loss Maternal Grandmother    • Heart disease Paternal Grandmother    • Stroke Paternal Grandmother    • Heart disease Paternal Grandfather    • Vision loss Paternal Grandfather    • Vision loss Father    • Vision loss Sister        Cancer-related  "family history is not on file.    Social History     Tobacco Use   • Smoking status: Current Every Day Smoker     Packs/day: 1.00     Types: Cigarettes, Electronic Cigarette, Cigars     Start date: 8/2/2002   • Smokeless tobacco: Never Used   Substance Use Topics   • Alcohol use: No     Frequency: Never   • Drug use: No         I have reviewed the history of present illness, past medical history, family history, social history, lab results, all notes and other records since the patient was last seen on  11/27/2019.    ROS:   Review of Systems   Constitutional: Positive for fatigue. Negative for activity change, chills and fever.   HENT: Negative for ear pain, mouth sores, nosebleeds and sore throat.    Eyes: Negative for photophobia and visual disturbance.   Respiratory: Negative for wheezing and stridor.    Cardiovascular: Negative for chest pain and palpitations.   Gastrointestinal: Positive for constipation (states it is chronic and he is, \"used to it\"), diarrhea and nausea. Negative for abdominal pain and vomiting.   Endocrine: Negative for cold intolerance and heat intolerance.   Genitourinary: Negative for dysuria and hematuria.   Musculoskeletal: Negative for joint swelling and neck stiffness.   Skin: Negative for color change and rash.   Neurological: Positive for dizziness (vertigo lately). Negative for seizures and syncope.   Hematological: Negative for adenopathy.        No obvious bleeding   Psychiatric/Behavioral: Negative for agitation, confusion and hallucinations.       Objective:  Vital signs:  Vitals:    01/15/20 0911   BP: 132/74   Pulse: 98   Resp: 16   Temp: 98.5 °F (36.9 °C)   Weight: 99.8 kg (220 lb)   Height: 188 cm (74\")   PainSc:   2   PainLoc: Abdomen     ECOG  (0) Fully active, able to carry on all predisease performance without restriction    Physical Exam:   Physical Exam   Constitutional: He is oriented to person, place, and time. He appears well-developed and well-nourished. No " distress.   HENT:   Head: Normocephalic and atraumatic.   Eyes: Conjunctivae and EOM are normal. Right eye exhibits no discharge. Left eye exhibits no discharge. No scleral icterus.   Wears glasses   Neck: Normal range of motion. Neck supple. No thyromegaly present.   Cardiovascular: Normal rate, regular rhythm and normal heart sounds. Exam reveals no gallop and no friction rub.   Pulmonary/Chest: Effort normal. No stridor. No respiratory distress. He has no wheezes.   Slightly decreased air entry bilaterally   Abdominal: Soft. Bowel sounds are normal. He exhibits no mass. There is no tenderness. There is no rebound and no guarding.   Musculoskeletal: Normal range of motion. He exhibits no tenderness.   Lymphadenopathy:     He has no cervical adenopathy.   Neurological: He is alert and oriented to person, place, and time. He exhibits normal muscle tone.   Skin: Skin is warm. No rash noted. He is not diaphoretic. No erythema.   Multiple tattoos   Psychiatric: He has a normal mood and affect. His behavior is normal.   Nursing note and vitals reviewed.            Lab Results - Last 18 Months   Lab Units 01/15/20  0900 12/13/19  1140 11/08/19  0912   WBC 10*3/mm3 7.82 6.72 7.97   HEMOGLOBIN g/dL 15.9 14.9 15.3   HEMATOCRIT % 44.4 41.4 44.5   PLATELETS 10*3/mm3 185 184 185   MCV fL 94.1 93.7 96.7     Lab Results - Last 18 Months   Lab Units 12/18/19  0944 12/13/19  1424 11/08/19  0912   SODIUM mmol/L 134* 138 138   POTASSIUM mmol/L 4.2 4.0 3.9   CHLORIDE mmol/L 98 100 100   CO2 mmol/L 26.2 27.0 26.7   BUN mg/dL 14 12 12   CREATININE mg/dL 0.80 0.84 1.01   CALCIUM mg/dL 9.5 9.5 9.4   BILIRUBIN mg/dL 0.5 0.6 0.8   ALK PHOS U/L 54 53 52   ALT (SGPT) U/L 65* 70* 36   AST (SGOT) U/L 57* 143* 46*   GLUCOSE mg/dL 109* 91 83       Lab Results   Component Value Date    GLUCOSE 109 (H) 12/18/2019    BUN 14 12/18/2019    CREATININE 0.80 12/18/2019    EGFRIFNONA 111 12/18/2019    BCR 17.5 12/18/2019    K 4.2 12/18/2019    CO2 26.2  12/18/2019    CALCIUM 9.5 12/18/2019    PROTENTOTREF 8.0 11/05/2019    ALBUMIN 4.40 12/18/2019    LABIL2 1.1 11/05/2019    AST 57 (H) 12/18/2019    ALT 65 (H) 12/18/2019       No results for input(s): APTT, INR, PTT in the last 97631 hours.    Lab Results   Component Value Date    IRON 130 12/13/2019    TIBC 346 12/13/2019    FERRITIN 34.35 12/13/2019       No results found for: FOLATE    No results found for: OCCULTBLD    No results found for: RETICCTPCT    Lab Results   Component Value Date    [id number] 319 06/13/2019     Lab Results   Component Value Date    SPEP Comment 11/05/2019     No results found for: LDH, URICACID  Lab Results   Component Value Date    SEDRATE 3 11/05/2019     No results found for: FIBRINOGEN, HAPTOGLOBIN  No results found for: PTT, INR  No results found for:   No results found for: CEA  No components found for: CA-19-9  No results found for: PSA      Assessment/Plan     Assessment:  1.  IgG kappa monoclonal gammopathy: Diagnosis is likely MGUS, diagnosed in November 2019..  M protein was 1.8 initially, but more recently it was around 1.4.  Skeletal survey was negative.  He does not report any frequent infections., Does not have any renal failure, bone fractures, anemia etc. pending bone marrow biopsy to assess plasma cell burden.  Urine immunofixation is also positive.  2. History of secondary polycythemia: Seems resolved. Bone marrow biopsy was unremarkable.  JAK2 mutation and EPO  level do not suggest any evidence of polycythemia vera.  He has secondary polycythemia likely due  to tobacco use and from sleep apnea.  The patient continues to smoke.  Patient did not want to  have a sleep study.  He has not required phlebotomy for the last 3 years His hemoglobin has stabilized.  Unfortunately he continues to smoke.   3. History of Matson’s esophagus.  He follows up with Dr. Hernandez.   4. History of tobacco use: Smoking cessation counseling was provided.            PLAN:      1. We  will proceed with bone marrow biopsy through interventional radiology.  We will be able to assess the plasma cell burden and also be able to get cytogenetics/FISH panel..  Discussed that if plasma cell burden is very high the diagnosis may change to myeloma.  2. Discussed with patient that he likely has MGUS and not myeloma.  3. Polycythemia resolved.   4. Will need repeat endoscopies for his Matson's esophagus.  5. Smoking cessation counseling provided  6. We will follow patient back in 2 months with repeat myeloma labs..          I counselled Eleazar of the risks of continuing to use tobacco and cessation.    During this visit, I spent 3-10 minutes counseling the patient regarding tobacco cessation.         Monoclonal gammopathy  - Protein Elec + Interp, Serum  - Immunoglobulin Free LT Chains Blood  - IgG, IgA, IgM  - CBC & Differential  - Comprehensive Metabolic Panel    Orders Placed This Encounter   Procedures   • Protein Elec + Interp, Serum     1 week prior to next appt     Standing Status:   Future     Standing Expiration Date:   1/15/2021     Scheduling Instructions:      Please draw these labs prior to the next visit.   • Immunoglobulin Free LT Chains Blood     1 week prior to next appt     Standing Status:   Future     Standing Expiration Date:   1/15/2021   • IgG, IgA, IgM     1 week prior to next appt     Standing Status:   Future     Standing Expiration Date:   1/15/2021   • Comprehensive Metabolic Panel     Please draw these labs prior to the next visit.  1 week prior to next appt     Standing Status:   Future     Scheduling Instructions:      Please draw these labs prior to the next visit.   • CBC & Differential     1 week prior to next appt     Standing Status:   Future     Standing Expiration Date:   1/15/2021     Order Specific Question:   Manual Differential     Answer:   No                   Thank you very much for providing the opportunity to participate in this patient’s care. Please do not  hesitate to call if there are any other questions.    I have reviewed all relevant labs results, outside reports, imaging, notes, vital signs, medications, and plan with the patient today.    Portions of the note have been scribed by medical assistant/Nurse.  I have reviewed and made changes accordingly.

## 2020-01-15 ENCOUNTER — OFFICE VISIT (OUTPATIENT)
Dept: ONCOLOGY | Facility: CLINIC | Age: 35
End: 2020-01-15

## 2020-01-15 ENCOUNTER — OFFICE VISIT (OUTPATIENT)
Dept: LAB | Facility: HOSPITAL | Age: 35
End: 2020-01-15

## 2020-01-15 VITALS
RESPIRATION RATE: 16 BRPM | HEIGHT: 74 IN | DIASTOLIC BLOOD PRESSURE: 74 MMHG | TEMPERATURE: 98.5 F | BODY MASS INDEX: 28.23 KG/M2 | WEIGHT: 220 LBS | HEART RATE: 98 BPM | SYSTOLIC BLOOD PRESSURE: 132 MMHG

## 2020-01-15 DIAGNOSIS — D75.1 POLYCYTHEMIA: ICD-10-CM

## 2020-01-15 DIAGNOSIS — K22.70 BARRETT'S ESOPHAGUS WITHOUT DYSPLASIA: ICD-10-CM

## 2020-01-15 DIAGNOSIS — D47.2 MONOCLONAL GAMMOPATHY: Primary | ICD-10-CM

## 2020-01-15 LAB
BASOPHILS # BLD AUTO: 0.02 10*3/MM3 (ref 0–0.2)
BASOPHILS NFR BLD AUTO: 0.3 % (ref 0–1.5)
DEPRECATED RDW RBC AUTO: 44.1 FL (ref 37–54)
EOSINOPHIL # BLD AUTO: 0.12 10*3/MM3 (ref 0–0.4)
EOSINOPHIL NFR BLD AUTO: 1.5 % (ref 0.3–6.2)
ERYTHROCYTE [DISTWIDTH] IN BLOOD BY AUTOMATED COUNT: 13.2 % (ref 12.3–15.4)
HCT VFR BLD AUTO: 44.4 % (ref 37.5–51)
HGB BLD-MCNC: 15.9 G/DL (ref 13–17.7)
LYMPHOCYTES # BLD AUTO: 2.74 10*3/MM3 (ref 0.7–3.1)
LYMPHOCYTES NFR BLD AUTO: 35 % (ref 19.6–45.3)
MCH RBC QN AUTO: 33.7 PG (ref 26.6–33)
MCHC RBC AUTO-ENTMCNC: 35.8 G/DL (ref 31.5–35.7)
MCV RBC AUTO: 94.1 FL (ref 79–97)
MONOCYTES # BLD AUTO: 0.54 10*3/MM3 (ref 0.1–0.9)
MONOCYTES NFR BLD AUTO: 6.9 % (ref 5–12)
NEUTROPHILS # BLD AUTO: 4.4 10*3/MM3 (ref 1.7–7)
NEUTROPHILS NFR BLD AUTO: 56.3 % (ref 42.7–76)
PLATELET # BLD AUTO: 185 10*3/MM3 (ref 140–450)
PMV BLD AUTO: 10.6 FL (ref 6–12)
RBC # BLD AUTO: 4.72 10*6/MM3 (ref 4.14–5.8)
WBC NRBC COR # BLD: 7.82 10*3/MM3 (ref 3.4–10.8)

## 2020-01-15 PROCEDURE — 85025 COMPLETE CBC W/AUTO DIFF WBC: CPT

## 2020-01-15 PROCEDURE — 99214 OFFICE O/P EST MOD 30 MIN: CPT | Performed by: INTERNAL MEDICINE

## 2020-01-15 PROCEDURE — 36415 COLL VENOUS BLD VENIPUNCTURE: CPT

## 2020-01-17 ENCOUNTER — HOSPITAL ENCOUNTER (OUTPATIENT)
Dept: CT IMAGING | Facility: HOSPITAL | Age: 35
Discharge: HOME | End: 2020-01-17
Admitting: RADIOLOGY

## 2020-01-17 VITALS
BODY MASS INDEX: 28.46 KG/M2 | SYSTOLIC BLOOD PRESSURE: 131 MMHG | OXYGEN SATURATION: 99 % | WEIGHT: 221.78 LBS | RESPIRATION RATE: 23 BRPM | DIASTOLIC BLOOD PRESSURE: 84 MMHG | HEART RATE: 69 BPM | HEIGHT: 74 IN | TEMPERATURE: 98.3 F

## 2020-01-17 DIAGNOSIS — D47.2 MONOCLONAL GAMMOPATHY: ICD-10-CM

## 2020-01-17 LAB
APTT PPP: 24.9 SECONDS (ref 24–31)
DEPRECATED RDW RBC AUTO: 45.1 FL (ref 37–54)
ERYTHROCYTE [DISTWIDTH] IN BLOOD BY AUTOMATED COUNT: 13.4 % (ref 12.3–15.4)
GIANT PLATELETS: ABNORMAL
HCT VFR BLD AUTO: 49.8 % (ref 37.5–51)
HGB BLD-MCNC: 17.5 G/DL (ref 13–17.7)
INR PPP: 1.04 (ref 0.9–1.1)
LYMPHOCYTES # BLD MANUAL: 1.47 10*3/MM3 (ref 0.7–3.1)
LYMPHOCYTES NFR BLD MANUAL: 16 % (ref 19.6–45.3)
LYMPHOCYTES NFR BLD MANUAL: 4 % (ref 5–12)
MCH RBC QN AUTO: 33.7 PG (ref 26.6–33)
MCHC RBC AUTO-ENTMCNC: 35.1 G/DL (ref 31.5–35.7)
MCV RBC AUTO: 96.1 FL (ref 79–97)
MONOCYTES # BLD AUTO: 0.37 10*3/MM3 (ref 0.1–0.9)
NEUTROPHILS # BLD AUTO: 6.35 10*3/MM3 (ref 1.7–7)
NEUTROPHILS NFR BLD MANUAL: 61 % (ref 42.7–76)
NEUTS BAND NFR BLD MANUAL: 8 % (ref 0–5)
NRBC SPEC MANUAL: 2 /100 WBC (ref 0–0.2)
PLATELET # BLD AUTO: 201 10*3/MM3 (ref 140–450)
PMV BLD AUTO: 9 FL (ref 6–12)
POLYCHROMASIA BLD QL SMEAR: ABNORMAL
PROTHROMBIN TIME: 10.8 SECONDS (ref 9.6–11.7)
RBC # BLD AUTO: 5.18 10*6/MM3 (ref 4.14–5.8)
SCAN SLIDE: NORMAL
VARIANT LYMPHS NFR BLD MANUAL: 11 % (ref 0–5)
WBC MORPH BLD: NORMAL
WBC NRBC COR # BLD: 9.2 10*3/MM3 (ref 3.4–10.8)

## 2020-01-17 PROCEDURE — 85097 BONE MARROW INTERPRETATION: CPT | Performed by: NURSE PRACTITIONER

## 2020-01-17 PROCEDURE — 77012 CT SCAN FOR NEEDLE BIOPSY: CPT

## 2020-01-17 PROCEDURE — 25010000002 ONDANSETRON PER 1 MG: Performed by: RADIOLOGY

## 2020-01-17 PROCEDURE — 99153 MOD SED SAME PHYS/QHP EA: CPT

## 2020-01-17 PROCEDURE — 85007 BL SMEAR W/DIFF WBC COUNT: CPT | Performed by: RADIOLOGY

## 2020-01-17 PROCEDURE — 85025 COMPLETE CBC W/AUTO DIFF WBC: CPT | Performed by: RADIOLOGY

## 2020-01-17 PROCEDURE — 25010000002 MIDAZOLAM PER 1 MG: Performed by: RADIOLOGY

## 2020-01-17 PROCEDURE — 85730 THROMBOPLASTIN TIME PARTIAL: CPT | Performed by: RADIOLOGY

## 2020-01-17 PROCEDURE — 88311 DECALCIFY TISSUE: CPT | Performed by: NURSE PRACTITIONER

## 2020-01-17 PROCEDURE — 99152 MOD SED SAME PHYS/QHP 5/>YRS: CPT

## 2020-01-17 PROCEDURE — 25010000002 FENTANYL CITRATE (PF) 100 MCG/2ML SOLUTION: Performed by: RADIOLOGY

## 2020-01-17 PROCEDURE — 88313 SPECIAL STAINS GROUP 2: CPT | Performed by: NURSE PRACTITIONER

## 2020-01-17 PROCEDURE — 88305 TISSUE EXAM BY PATHOLOGIST: CPT | Performed by: NURSE PRACTITIONER

## 2020-01-17 PROCEDURE — 85610 PROTHROMBIN TIME: CPT | Performed by: RADIOLOGY

## 2020-01-17 RX ORDER — MIDAZOLAM HYDROCHLORIDE 1 MG/ML
INJECTION INTRAMUSCULAR; INTRAVENOUS
Status: COMPLETED | OUTPATIENT
Start: 2020-01-17 | End: 2020-01-17

## 2020-01-17 RX ORDER — SODIUM CHLORIDE 0.9 % (FLUSH) 0.9 %
3-10 SYRINGE (ML) INJECTION AS NEEDED
Status: DISCONTINUED | OUTPATIENT
Start: 2020-01-17 | End: 2020-01-17 | Stop reason: HOSPADM

## 2020-01-17 RX ORDER — FENTANYL CITRATE 50 UG/ML
INJECTION, SOLUTION INTRAMUSCULAR; INTRAVENOUS
Status: COMPLETED | OUTPATIENT
Start: 2020-01-17 | End: 2020-01-17

## 2020-01-17 RX ORDER — HYDROCODONE BITARTRATE AND ACETAMINOPHEN 5; 325 MG/1; MG/1
2 TABLET ORAL EVERY 6 HOURS PRN
Status: DISCONTINUED | OUTPATIENT
Start: 2020-01-17 | End: 2020-01-18 | Stop reason: HOSPADM

## 2020-01-17 RX ORDER — ONDANSETRON 2 MG/ML
INJECTION INTRAMUSCULAR; INTRAVENOUS
Status: COMPLETED | OUTPATIENT
Start: 2020-01-17 | End: 2020-01-17

## 2020-01-17 RX ORDER — SODIUM CHLORIDE 9 MG/ML
75 INJECTION, SOLUTION INTRAVENOUS CONTINUOUS
Status: DISCONTINUED | OUTPATIENT
Start: 2020-01-17 | End: 2020-01-18 | Stop reason: HOSPADM

## 2020-01-17 RX ADMIN — ONDANSETRON 4 MG: 2 INJECTION INTRAMUSCULAR; INTRAVENOUS at 09:08

## 2020-01-17 RX ADMIN — MIDAZOLAM 0.5 MG: 1 INJECTION INTRAMUSCULAR; INTRAVENOUS at 09:11

## 2020-01-17 RX ADMIN — MIDAZOLAM 0.5 MG: 1 INJECTION INTRAMUSCULAR; INTRAVENOUS at 09:10

## 2020-01-17 RX ADMIN — FENTANYL CITRATE 50 MCG: 50 INJECTION, SOLUTION INTRAMUSCULAR; INTRAVENOUS at 09:10

## 2020-01-17 RX ADMIN — MIDAZOLAM 1 MG: 1 INJECTION INTRAMUSCULAR; INTRAVENOUS at 09:14

## 2020-01-17 RX ADMIN — FENTANYL CITRATE 50 MCG: 50 INJECTION, SOLUTION INTRAMUSCULAR; INTRAVENOUS at 09:11

## 2020-01-17 NOTE — POST-PROCEDURE NOTE
IR POST OP NOTE    Procedure:CT guided bone marrow biopsy      Pre Op DX:monoclonal gammopathy      Post Op DX:same      Anesthesia: Local, CS      Findings:Core and aspirates obtained      Complications:No immediate      Provider Signature: Dr. Florin Mack

## 2020-01-17 NOTE — DISCHARGE INSTRUCTIONS
A responsible adult should stay with you and you should rest quietly for the rest of the day. Do not drink alcohol, drive or cook for 24 hours following your procedure.  Progress your diet as tolerated.  Resume your usual medications including aspirin.  When you remove your dressing in 48 hours, a small amount of blood is to be expected. Do not be alarmed.  If you feel it is bleeding excessively apply pressure and proceed to the Emergency room.  Do not shower, bath, or get your dressing wet at all for 48 hours.  You may shower after the dressing is removed. No lifting more that 10 pounds for 48 hours.  If severe pain, increased shortness of air or racing heartbeat occur, seek immediate medical attention.  Follow up with Dr. Preciado for results.

## 2020-01-18 PROCEDURE — 81050 URINALYSIS VOLUME MEASURE: CPT | Performed by: PHYSICIAN ASSISTANT

## 2020-01-18 PROCEDURE — 82384 ASSAY THREE CATECHOLAMINES: CPT | Performed by: PHYSICIAN ASSISTANT

## 2020-01-18 PROCEDURE — 84585 ASSAY OF URINE VMA: CPT | Performed by: PHYSICIAN ASSISTANT

## 2020-01-18 PROCEDURE — 83497 ASSAY OF 5-HIAA: CPT | Performed by: PHYSICIAN ASSISTANT

## 2020-01-20 ENCOUNTER — APPOINTMENT (OUTPATIENT)
Dept: LAB | Facility: HOSPITAL | Age: 35
End: 2020-01-20

## 2020-01-20 LAB — REF LAB TEST METHOD: NORMAL

## 2020-01-23 LAB
5OH-INDOLEACETATE 24H UR-MCNC: 1.5 MG/L
5OH-INDOLEACETATE 24H UR-MRATE: 1.6 MG/24 HR (ref 0–14.9)
DOPAMINE 24H UR-MRATE: 207 UG/24 HR (ref 0–510)
DOPAMINE UR-MCNC: 193 UG/L
EPINEPH 24H UR-MRATE: 4 UG/24 HR (ref 0–20)
EPINEPH UR-MCNC: 4 UG/L
NOREPINEPH 24H UR-MRATE: 29 UG/24 HR (ref 0–135)
NOREPINEPH UR-MCNC: 27 UG/L
VMA 24H UR-MRATE: 2.3 MG/24 HR (ref 0–7.5)
VMA UR-MCNC: 2.1 MG/L

## 2020-01-27 LAB — KARYOTYP MAR: NORMAL

## 2020-01-31 ENCOUNTER — TELEPHONE (OUTPATIENT)
Dept: ONCOLOGY | Facility: CLINIC | Age: 35
End: 2020-01-31

## 2020-01-31 ENCOUNTER — DOCUMENTATION (OUTPATIENT)
Dept: ONCOLOGY | Facility: CLINIC | Age: 35
End: 2020-01-31

## 2020-01-31 NOTE — PROGRESS NOTES
I returned the patient's call and discussed his bone marrow results as he requested.  At this time, he does have plasma cells in his bone marrow but there is not enough plasma cells to diagnose him with multiple myeloma.  I explained that his hemoglobin, creatinine and calcium level were all within normal limits and at this time, he will likely be monitored closely.  Further discussion regarding his diagnosis and results to be done with Dr. Preciado at his follow-up appointment in March 2020.  Electronically signed by HAIR Yo, 01/31/20, 3:00 PM.

## 2020-02-06 ENCOUNTER — OFFICE VISIT (OUTPATIENT)
Dept: FAMILY MEDICINE CLINIC | Facility: CLINIC | Age: 35
End: 2020-02-06

## 2020-02-06 VITALS
HEART RATE: 81 BPM | TEMPERATURE: 97.9 F | BODY MASS INDEX: 28.63 KG/M2 | DIASTOLIC BLOOD PRESSURE: 79 MMHG | SYSTOLIC BLOOD PRESSURE: 132 MMHG | WEIGHT: 223 LBS | OXYGEN SATURATION: 99 %

## 2020-02-06 DIAGNOSIS — R42 VERTIGO: ICD-10-CM

## 2020-02-06 DIAGNOSIS — J32.0 CHRONIC MAXILLARY SINUSITIS: ICD-10-CM

## 2020-02-06 DIAGNOSIS — D47.2 MONOCLONAL GAMMOPATHY: Primary | ICD-10-CM

## 2020-02-06 DIAGNOSIS — R74.8 ELEVATED LIVER ENZYMES: ICD-10-CM

## 2020-02-06 PROCEDURE — 99214 OFFICE O/P EST MOD 30 MIN: CPT | Performed by: PHYSICIAN ASSISTANT

## 2020-02-06 RX ORDER — MECLIZINE HCL 12.5 MG/1
TABLET ORAL
Qty: 60 TABLET | Refills: 1 | Status: SHIPPED | OUTPATIENT
Start: 2020-02-06

## 2020-02-06 NOTE — PROGRESS NOTES
Subjective  Eleazar Jeong is a 34 y.o. male     History of Present Illness  Patient is a 34-year-old white male here to follow-up on numerous concerns which include:    1.  Monoclonal gammopathy: Patient has been diagnosed with monoclonal gammopathy, he has numerous concerning symptoms including unintentional weight loss, night sweats, dizziness, mental fogginess.  He has been referred to hematology who has worked him up further, however he states they are done working him up and have told him they just want to monitor at this point.  He does not like this answer and would like to be seen at [facility].    2.  Elevated liver enzymes: I have previously diagnosed patient with elevated liver enzymes, I have referred him to gastroenterology for further evaluation.  He states he has not followed up with them thus far, he needs to make that appointment.  He continues to have pain in his side.    3.  Vertigo: Patient complains of dizziness, the room spinning, and nausea with positional head changes that comes and goes.  He states sometimes he gets severe and he has to miss work.  He would like to have some medication to help with the dizziness.  He states he has not had any head trauma or new confusion.    4.  Chronic drainage/sinusitis: Patient complains of chronic postnasal drainage and sinus pressure.  He states he has tried over-the-counter medications for this without relief.  He has never seen an ear nose and throat doctor but states he would like to be referred.    The following portions of the patient's history were reviewed and updated as appropriate: allergies, current medications, past family history, past medical history, past social history, past surgical history and problem list.    Review of Systems   Constitutional: Positive for fatigue and unexpected weight loss. Negative for fever.   HENT: Positive for congestion, postnasal drip and sinus pressure. Negative for ear pain and sore  throat.    Respiratory: Negative for shortness of breath.    Cardiovascular: Negative for chest pain.   Gastrointestinal: Negative for diarrhea, nausea and vomiting.   Neurological: Positive for dizziness. Negative for seizures, facial asymmetry, headache and confusion.   Psychiatric/Behavioral: Negative for suicidal ideas and depressed mood.     Objective  Physical Exam   Constitutional: He is oriented to person, place, and time. He appears well-developed and well-nourished.   HENT:   Head: Normocephalic and atraumatic.   Eyes: Pupils are equal, round, and reactive to light.   Neck: Normal range of motion. Neck supple.   Cardiovascular: Normal rate, regular rhythm and normal heart sounds.   Pulmonary/Chest: Effort normal and breath sounds normal.   Neurological: He is alert and oriented to person, place, and time. He has normal strength. No cranial nerve deficit or sensory deficit.   Mild right-sided horizontal nystagmus appreciated.  Worsening vertigo with positional head changes such as bending over to tie shoes and looking up at the ceiling.  Dizziness does worsen with left and right eye movements as well.   Psychiatric: He has a normal mood and affect. His behavior is normal.       Vitals:    02/06/20 0840   BP: 132/79   BP Location: Right arm   Patient Position: Sitting   Cuff Size: Adult   Pulse: 81   Temp: 97.9 °F (36.6 °C)   TempSrc: Oral   SpO2: 99%   Weight: 101 kg (223 lb)     Body mass index is 28.63 kg/m².    PHQ-9 Total Score:        Assessment/Plan  Diagnoses and all orders for this visit:    1. Monoclonal gammopathy (Primary)  Comments:  Patient states the hematologist does not want a work-up any further, he would like a referral to [facility] for further evaluation and treatment.  Orders:  -     Ambulatory Referral to Hematology    2. Elevated liver enzymes  Comments:  Patient has not followed up with GI thus far, he states he needs to make that appointment.  I previously entered a  referral.    3. Vertigo  Comments:  Patient complains of vertigo that comes and goes, I am treating with meclizine and referring to physical therapy for positional head maneuvers.  Orders:  -     Ambulatory Referral to Physical Therapy Evaluate and treat    4. Chronic maxillary sinusitis  Comments:  Referral to ENT due to chronic sinusitis/postnasal drainage.  Over-the-counter medications have not improved her symptoms.  Orders:  -     Ambulatory Referral to ENT (Otolaryngology)    Other orders  -     meclizine (ANTIVERT) 12.5 MG tablet; 1-2 tabs PO TID PRN dizziness  Dispense: 60 tablet; Refill: 1

## 2020-02-25 ENCOUNTER — OFFICE (AMBULATORY)
Dept: URBAN - METROPOLITAN AREA CLINIC 64 | Facility: CLINIC | Age: 35
End: 2020-02-25

## 2020-02-25 VITALS
HEART RATE: 81 BPM | DIASTOLIC BLOOD PRESSURE: 83 MMHG | SYSTOLIC BLOOD PRESSURE: 117 MMHG | HEIGHT: 74 IN | WEIGHT: 229 LBS

## 2020-02-25 DIAGNOSIS — R74.8 ABNORMAL LEVELS OF OTHER SERUM ENZYMES: ICD-10-CM

## 2020-02-25 DIAGNOSIS — Z72.0 TOBACCO USE: ICD-10-CM

## 2020-02-25 DIAGNOSIS — R63.0 ANOREXIA: ICD-10-CM

## 2020-02-25 DIAGNOSIS — K22.70 BARRETT'S ESOPHAGUS WITHOUT DYSPLASIA: ICD-10-CM

## 2020-02-25 PROCEDURE — 99214 OFFICE O/P EST MOD 30 MIN: CPT | Performed by: NURSE PRACTITIONER

## 2020-03-11 ENCOUNTER — LAB (OUTPATIENT)
Dept: LAB | Facility: HOSPITAL | Age: 35
End: 2020-03-11

## 2020-03-11 DIAGNOSIS — D47.2 MONOCLONAL GAMMOPATHY: ICD-10-CM

## 2020-03-11 LAB
ALBUMIN SERPL-MCNC: 4.3 G/DL (ref 3.5–5.2)
ALBUMIN/GLOB SERPL: 1.2 G/DL
ALP SERPL-CCNC: 56 U/L (ref 39–117)
ALT SERPL W P-5'-P-CCNC: 193 U/L (ref 1–41)
ANION GAP SERPL CALCULATED.3IONS-SCNC: 10 MMOL/L (ref 5–15)
AST SERPL-CCNC: 602 U/L (ref 1–40)
BASOPHILS # BLD AUTO: 0.02 10*3/MM3 (ref 0–0.2)
BASOPHILS NFR BLD AUTO: 0.3 % (ref 0–1.5)
BILIRUB SERPL-MCNC: 0.6 MG/DL (ref 0.2–1.2)
BUN BLD-MCNC: 12 MG/DL (ref 6–20)
BUN/CREAT SERPL: 13 (ref 7–25)
CALCIUM SPEC-SCNC: 8.8 MG/DL (ref 8.6–10.5)
CHLORIDE SERPL-SCNC: 104 MMOL/L (ref 98–107)
CO2 SERPL-SCNC: 26 MMOL/L (ref 22–29)
CREAT BLD-MCNC: 0.92 MG/DL (ref 0.76–1.27)
DEPRECATED RDW RBC AUTO: 46.2 FL (ref 37–54)
EOSINOPHIL # BLD AUTO: 0.17 10*3/MM3 (ref 0–0.4)
EOSINOPHIL NFR BLD AUTO: 2.2 % (ref 0.3–6.2)
ERYTHROCYTE [DISTWIDTH] IN BLOOD BY AUTOMATED COUNT: 13.9 % (ref 12.3–15.4)
GFR SERPL CREATININE-BSD FRML MDRD: 94 ML/MIN/1.73
GLOBULIN UR ELPH-MCNC: 3.6 GM/DL
GLUCOSE BLD-MCNC: 112 MG/DL (ref 65–99)
HCT VFR BLD AUTO: 40.7 % (ref 37.5–51)
HGB BLD-MCNC: 14.9 G/DL (ref 13–17.7)
IGA1 MFR SER: 101 MG/DL (ref 70–400)
IGG1 SER-MCNC: 2104 MG/DL (ref 700–1600)
IGM SERPL-MCNC: 51 MG/DL (ref 40–230)
LYMPHOCYTES # BLD AUTO: 2.1 10*3/MM3 (ref 0.7–3.1)
LYMPHOCYTES NFR BLD AUTO: 26.9 % (ref 19.6–45.3)
MCH RBC QN AUTO: 34.3 PG (ref 26.6–33)
MCHC RBC AUTO-ENTMCNC: 36.6 G/DL (ref 31.5–35.7)
MCV RBC AUTO: 93.6 FL (ref 79–97)
MONOCYTES # BLD AUTO: 0.6 10*3/MM3 (ref 0.1–0.9)
MONOCYTES NFR BLD AUTO: 7.7 % (ref 5–12)
NEUTROPHILS # BLD AUTO: 4.91 10*3/MM3 (ref 1.7–7)
NEUTROPHILS NFR BLD AUTO: 62.9 % (ref 42.7–76)
PLATELET # BLD AUTO: 206 10*3/MM3 (ref 140–450)
PMV BLD AUTO: 10.6 FL (ref 6–12)
POTASSIUM BLD-SCNC: 3.6 MMOL/L (ref 3.5–5.2)
PROT SERPL-MCNC: 7.9 G/DL (ref 6–8.5)
RBC # BLD AUTO: 4.35 10*6/MM3 (ref 4.14–5.8)
SODIUM BLD-SCNC: 140 MMOL/L (ref 136–145)
WBC NRBC COR # BLD: 7.8 10*3/MM3 (ref 3.4–10.8)

## 2020-03-11 PROCEDURE — 85025 COMPLETE CBC W/AUTO DIFF WBC: CPT

## 2020-03-11 PROCEDURE — 36415 COLL VENOUS BLD VENIPUNCTURE: CPT

## 2020-03-11 PROCEDURE — 80053 COMPREHEN METABOLIC PANEL: CPT | Performed by: NURSE PRACTITIONER

## 2020-03-11 PROCEDURE — 82784 ASSAY IGA/IGD/IGG/IGM EACH: CPT | Performed by: NURSE PRACTITIONER

## 2020-03-12 LAB
ALBUMIN SERPL-MCNC: 4.1 G/DL (ref 2.9–4.4)
ALBUMIN/GLOB SERPL: 1.1 {RATIO} (ref 0.7–1.7)
ALPHA1 GLOB FLD ELPH-MCNC: 0.2 G/DL (ref 0–0.4)
ALPHA2 GLOB SERPL ELPH-MCNC: 0.6 G/DL (ref 0.4–1)
B-GLOBULIN SERPL ELPH-MCNC: 0.9 G/DL (ref 0.7–1.3)
GAMMA GLOB SERPL ELPH-MCNC: 2 G/DL (ref 0.4–1.8)
GLOBULIN SER CALC-MCNC: 3.7 G/DL (ref 2.2–3.9)
KAPPA LC SERPL-MCNC: 48.1 MG/L (ref 3.3–19.4)
KAPPA LC/LAMBDA SER: 6.97 {RATIO} (ref 0.26–1.65)
LAMBDA LC FREE SERPL-MCNC: 6.9 MG/L (ref 5.7–26.3)
Lab: ABNORMAL
M-SPIKE: 1.7 G/DL
PROT PATTERN SERPL ELPH-IMP: ABNORMAL
PROT SERPL-MCNC: 7.8 G/DL (ref 6–8.5)

## 2020-03-17 ENCOUNTER — TELEPHONE (OUTPATIENT)
Dept: ONCOLOGY | Facility: CLINIC | Age: 35
End: 2020-03-17

## 2020-03-17 NOTE — TELEPHONE ENCOUNTER
I called and left a message for patient to call us back and answer our screening questions; have you travel in the past 30 days or been exposed to covid 19, do you have fever, cough, or SOA.

## 2020-03-17 NOTE — PROGRESS NOTES
HEMATOLOGY ONCOLOGY FOLLOW UP        Patient name: Eleazar Jeong  : 1985  MRN: [id number]  Primary Care Physician: Tierra Payan PA  Referring Physician: Tierra Payan PA  Reason For Consult:     Chief Complaint   Patient presents with   • Follow-up     IgG kappa monoclonal gammopathy   Monoclonal gammopathy  History of polycythemia    History of Present Illness:    1. IgG kappa monoclonal gammopathy:    2018 to 2019 patient has not followed up in our office.  Patient was referred back by MARTIN Lacey due to IgG kappa monoclonal gammopathy  · 2019: Serum protein electrophoresis shows M protein 1.8 g/dL  · 2019 IgA 116, IgG 2206 high, IgM 55, serum immunofixation shows IgG kappa monoclonal gammopathy.  · 2019 creatinine 0.84, BUN 12,  high, ALT 70, iron 130, ferritin 34.3, iron saturation 38%, TIBC 346  WBC 6.7, hemoglobin 14.9, platelets 184, MCV 93.7   · 2019: Skeletal survey: No evidence of multiple myeloma.  No lytic or blastic lesions.  · 2019  SPEP M protein 1.38 free kappa light chains 37.6 high, Urine protein electrophoresis M spike 111.7, M spike to 3.38.,  24-hour urine protein is 260, urine immunofixation shows free kappa paraprotein peak and also a small IgG kappa paraprotein peak.  · 2019 calcium 9.5, creatinine 0.8, ALT 65 high, AST 57 high, hepatitis panel negative,  · 2019 skeletal survey: No evidence of bone lytic lesions.  · 2019:UPEP shows to M spike's 4.5% and 1.3%.  24-hour urine protein to 60 high,  · 2020: WBC 9.2, hemoglobin 17.5, MCV 96.1, platelets 201  · 2020 bone marrow aspiration and biopsy: Atypical marrow plasmacytosis 5 to 10%, consistent with plasma cell neoplasm.  Normocellular to focally mildly hypocellular marrow for age, 40 to 50%, with trilineage hematopoiesis.  No increase in reticulin fibrosis.  Storage iron present.  Flow cytometry: Clonal plasma cell  population detected 1% of analyzed cells, with aberrant expression of  and cytoplasmic kappa light chain restriction.,  Normal cytogenetics 46 XY  · 3/11/2020 IgG 2104, IgM 51, IgA 101, SPEP shows M protein 1.7 Free kappa light chains 48.1, free lambda light chain 6.9, kappa lambda ratio 6.97 high, WBC 7.8, hemoglobin 14.9, platelets 206, MCV 93.6  · 3/11/2020:  high,  high, albumin 4.3, alk phos 56, bilirubin 0.6  · 3/18/2020 WBC 7.7, hemoglobin 16, MCV 94, platelets 202      2.  History of polycythemia:  Patient presented to PCP Dr. Ann’s office on 4/11/16 that showed hemoglobin of 18.3.  MCV was  95.0.  He is referred to us for polycythemia.  The patient has a history of chronic anxiety and   depression.   · 4/11/16 - WBC 9.2, hemoglobin 18.3, MCV 95.0, platelet count 182,000.  Differential normal.    Vitamin B12 554.  5/10/2016, the patient presents for initial consultation for polycythemia.  He reports symptoms of occasional headaches.  He does have history of migraines.  He also reports some  nonspecific vision problems.  He also reports weight loss of around 30 pounds during the past six months.  He denies any fevers or any lymph node enlargement.  He also reports some night sweats.  He does feel tired all the time.  He denies having any snoring, but he lives alone.  He does report some symptoms of excessive daytime sleepiness.  He smokes about 3-4 cigars per day.  He  denies having a CBC in the past.    ·  5/10/16 - Creatinine 0.87.  LFTs normal.  WBC 7.3, hemoglobin 16.7, MCV 94.5, platelet count   189,000.  Erythropoietin level 10.6.  JAK2 V617 mutations negative.  Exon 12 and exon 13 mutation   negative.   mutation negative.    ·  6/8/16 - Bone marrow biopsy:  Hemodiluted hypocellular aspirate.  However, flow cytometry is   adequate and it does not reveal any abnormal immunophenotype.  Cytogenetics shows normal   karyotype.  The clot section showed a single bone marrow  particle.  There was no evidence of   ringed sideroblasts.    · 6/30/16 - WBC 8.7, hemoglobin 17.6, MCV 94, platelet count 166,000.  · 9/1/16 - WBC 9.4, hemoglobin 16.8, MCV 94.9, platelet count 204,000.  Patient underwent   phlebotomy.  ·  1/5/17 - Hemoglobin 16.5.  Patient underwent phlebotomy.  ·  1/9/17 - WBC 9.8, hemoglobin 15.3, platelet count 195,000.    · 5/12/17 - WBC 6.9, hemoglobin 15.9, platelet count 185,000, MCV 91.3.    · 9/15/17 - WBC 9.4, hemoglobin 16.2, platelet count 201,000, MCV 91.1.   · 12/6/17 - EGD:  Probable Matson’s esophagus. Antral gastritis.  Glandular mucosa with focal intestinal metaplasia, consistent with Matson’s.    ·  3/21/18 - Brain MRI with and without contrast:  A 2.3 cm structure along the superior margin of the cerebellum at the midline, consistent arachnoid cyst.       Subjective:03/18/2020  Patient is a 34 year old male who presents for a follow up of IgG kappa monoclonal gammopathy. He continues to smoke. However his polycythemia has resolved. He reports fatigue and chronic abdominal pain. Patient underwent CT guided bone marrow biopsy on 01/17/2020. He follows up with Dr. Dumas at [facility] in [place], but states they are booked until around May. He states he is scheduled for an endoscopy and a liver biopsy in April.     The following portions of the patient's history were reviewed and updated as appropriate: allergies, current medications, past family history, past medical history, past social history, past surgical history and problem list.       Past Medical History:   Diagnosis Date   • Benign monoclonal gammopathy    • Colon polyp    • Depression    • Erectile dysfunction    • Headache    • Low back pain    • Pneumonia    • Tremor    • Visual impairment        Past Surgical History:   Procedure Laterality Date   • COLONOSCOPY           Current Outpatient Medications:   •  colestipol (COLESTID) 1 g tablet, Daily., Disp: , Rfl:   •  diphenhydrAMINE (BENADRYL ALLERGY)  25 MG tablet, BENADRYL 25MG, Disp: , Rfl:   •  ibuprofen (ADVIL,MOTRIN) 800 MG tablet, Every 8 (Eight) Hours., Disp: , Rfl:   •  Loperamide HCl (IMODIUM PO), IMODIUM, Disp: , Rfl:   •  meclizine (ANTIVERT) 12.5 MG tablet, 1-2 tabs PO TID PRN dizziness, Disp: 60 tablet, Rfl: 1  •  omeprazole (priLOSEC) 40 MG capsule, OMEPRAZOLE 40 MG CPDR, Disp: , Rfl:   •  sildenafil (REVATIO) 20 MG tablet, TAKE UP TO 5 TABLETS BY MOUTH 30 MINUTES PRIOR TO INTERCOURSE, Disp: , Rfl: 99    Allergies   Allergen Reactions   • Amoxicillin Unknown (See Comments)   • Erythromycin Hives   • Levofloxacin Hives   • Penicillin G Unknown (See Comments)   • Penicillins Other (See Comments)       Family History   Problem Relation Age of Onset   • Diabetes Mother    • Vision loss Mother    • COPD Paternal Aunt    • Vision loss Paternal Aunt    • Diabetes Maternal Grandmother    • Vision loss Maternal Grandmother    • Cancer Maternal Grandmother    • Heart disease Paternal Grandmother    • Stroke Paternal Grandmother    • Heart disease Paternal Grandfather    • Vision loss Paternal Grandfather    • Vision loss Father    • Vision loss Sister        Cancer-related family history includes Cancer in his maternal grandmother.    Social History     Tobacco Use   • Smoking status: Current Every Day Smoker     Packs/day: 1.00     Years: 17.00     Pack years: 17.00     Types: Cigarettes, Cigars, Electronic Cigarette     Start date: 8/2/2002   • Smokeless tobacco: Never Used   Substance Use Topics   • Alcohol use: No     Frequency: Never   • Drug use: No         I have reviewed the history of present illness, past medical history, family history, social history, lab results, all notes and other records since the patient was last seen on  11/27/2019.    ROS:   Review of Systems   Constitutional: Positive for fatigue. Negative for activity change, chills and fever.   HENT: Negative for ear pain, mouth sores, nosebleeds and sore throat.    Eyes: Negative for  "photophobia and visual disturbance.   Respiratory: Negative for wheezing and stridor.    Cardiovascular: Negative for chest pain and palpitations.   Gastrointestinal: Positive for constipation (states it is chronic and he is, \"used to it\"), diarrhea and nausea. Negative for abdominal pain and vomiting.   Endocrine: Negative for cold intolerance and heat intolerance.   Genitourinary: Negative for dysuria and hematuria.   Musculoskeletal: Negative for joint swelling and neck stiffness.   Skin: Negative for color change and rash.   Neurological: Positive for dizziness (vertigo lately). Negative for seizures and syncope.   Hematological: Negative for adenopathy.        No obvious bleeding   Psychiatric/Behavioral: Negative for agitation, confusion and hallucinations.       Objective:  Vital signs:  Vitals:    03/18/20 0909   BP: 133/80   Pulse: 77   Resp: 18   Temp: 98.1 °F (36.7 °C)   Weight: 101 kg (223 lb 3.2 oz)   Height: 188 cm (74\")   PainSc: 0-No pain     ECOG  (0) Fully active, able to carry on all predisease performance without restriction    Physical Exam:   Physical Exam   Constitutional: He is oriented to person, place, and time. He appears well-developed and well-nourished. No distress.   HENT:   Head: Normocephalic and atraumatic.   Eyes: Conjunctivae and EOM are normal. Right eye exhibits no discharge. Left eye exhibits no discharge. No scleral icterus.   Wears glasses   Neck: Normal range of motion. Neck supple. No thyromegaly present.   Cardiovascular: Normal rate, regular rhythm and normal heart sounds. Exam reveals no gallop and no friction rub.   Pulmonary/Chest: Effort normal. No stridor. No respiratory distress. He has no wheezes.   Slightly decreased air entry bilaterally   Abdominal: Soft. Bowel sounds are normal. He exhibits no mass. There is no tenderness. There is no rebound and no guarding.   Musculoskeletal: Normal range of motion. He exhibits no tenderness.   Lymphadenopathy:     He has no " cervical adenopathy.   Neurological: He is alert and oriented to person, place, and time. He exhibits normal muscle tone.   Skin: Skin is warm. No rash noted. He is not diaphoretic. No erythema.   Multiple tattoos   Psychiatric: He has a normal mood and affect. His behavior is normal.   Nursing note and vitals reviewed.            Lab Results - Last 18 Months   Lab Units 03/18/20  1039 03/11/20  0826 01/17/20  0808   WBC 10*3/mm3 7.70 7.80 9.20   HEMOGLOBIN g/dL 16.0 14.9 17.5   HEMATOCRIT % 44.1 40.7 49.8   PLATELETS 10*3/mm3 202 206 201   MCV fL 94.0 93.6 96.1     Lab Results - Last 18 Months   Lab Units 03/11/20  0826 12/18/19  0944 12/13/19  1424   SODIUM mmol/L 140 134* 138   POTASSIUM mmol/L 3.6 4.2 4.0   CHLORIDE mmol/L 104 98 100   CO2 mmol/L 26.0 26.2 27.0   BUN mg/dL 12 14 12   CREATININE mg/dL 0.92 0.80 0.84   CALCIUM mg/dL 8.8 9.5 9.5   BILIRUBIN mg/dL 0.6 0.5 0.6   ALK PHOS U/L 56 54 53   ALT (SGPT) U/L 193* 65* 70*   AST (SGOT) U/L 602* 57* 143*   GLUCOSE mg/dL 112* 109* 91       Lab Results   Component Value Date    GLUCOSE 112 (H) 03/11/2020    BUN 12 03/11/2020    CREATININE 0.92 03/11/2020    EGFRIFNONA 94 03/11/2020    BCR 13.0 03/11/2020    K 3.6 03/11/2020    CO2 26.0 03/11/2020    CALCIUM 8.8 03/11/2020    PROTENTOTREF 7.8 03/11/2020    ALBUMIN 4.1 03/11/2020    ALBUMIN 4.30 03/11/2020    LABIL2 1.1 03/11/2020     (H) 03/11/2020     (H) 03/11/2020       Lab Results - Last 18 Months   Lab Units 01/17/20  0808   INR  1.04   APTT seconds 24.9       Lab Results   Component Value Date    IRON 130 12/13/2019    TIBC 346 12/13/2019    FERRITIN 34.35 12/13/2019       No results found for: FOLATE    No results found for: OCCULTBLD    No results found for: RETICCTPCT    Lab Results   Component Value Date    WOVFPNLM80 319 06/13/2019     Lab Results   Component Value Date    SPEP Comment 03/11/2020     No results found for: LDH, URICACID  Lab Results   Component Value Date    SEDRATE 3  11/05/2019     No results found for: FIBRINOGEN, HAPTOGLOBIN  Lab Results   Component Value Date    PTT 24.9 01/17/2020    INR 1.04 01/17/2020     No results found for:   No results found for: CEA  No components found for: CA-19-9  No results found for: PSA      Assessment/Plan     Assessment:  1.  IgG kappa monoclonal gammopathy: Diagnosis is  MGUS, diagnosed in November 2019..  M protein was 1.8 initially, but more recently it was around 1.7.  Skeletal survey was negative.  He does not report any frequent infections., Does not have any renal failure, bone fractures, anemia etc..  Bone marrow biopsy shows about 5 to 10% plasma cells.  Cytogenetics showed normal karyotype..  Urine immunofixation is also positive.  M protein remains stable as of 3/11/2020 and he does not report any frequent major infections.  2. Elevated liver enzymes: ALT is up to 193 and AST up to 602 as of 3/11/2020.  Patient is following with gastroenterology.  He was advised to see them ASAP.  Liver biopsy is also being considered.  3. History of secondary polycythemia: Seems resolved. Bone marrow biopsy was unremarkable.  JAK2 mutation and EPO  level do not suggest any evidence of polycythemia vera.  He has secondary polycythemia likely due  to tobacco use and from sleep apnea.  The patient continues to smoke.  Patient did not want to  have a sleep study.  He has not required phlebotomy for the last 3 years His hemoglobin has stabilized.  Unfortunately he continues to smoke.   4. History of Matson’s esophagus.  He follows up with Dr. Hernandez.   5. History of tobacco use: Smoking cessation counseling was provided.       PLAN:    1. We will have them run myeloma FISH panel on the bone marrow.  2. Repeat myeloma labs which includes SPEP, free light chain assay, CBC CMP in about 2 and half months  3. Patient not interested in smoking cessation.  4. Recommended urgent evaluation with the gastroenterology for increasing liver enzymes.  He is  also likely going to need liver biopsy.  I will speak with Dr. Dumas about this.  Liver biopsy will be able to help exclude amyloidosis.  5. Will need repeat endoscopies for his Matson's esophagus.  6. We will follow patient back in 2 and a half months with repeat myeloma labs..          I counselled Eleazar of the risks of continuing to use tobacco and cessation.    During this visit, I spent 3-10 minutes counseling the patient regarding tobacco cessation.         Monoclonal gammopathy  - CBC & Differential  - Comprehensive Metabolic Panel  - Comprehensive Metabolic Panel  - Protein Elec + Interp, Serum  - Immunoglobulin Free LT Chains Blood  - IgG, IgA, IgM    Polycythemia  - CBC & Differential  - Comprehensive Metabolic Panel  - Comprehensive Metabolic Panel  - Protein Elec + Interp, Serum  - Immunoglobulin Free LT Chains Blood  - IgG, IgA, IgM    Orders Placed This Encounter   Procedures   • Comprehensive Metabolic Panel     One week prior     Standing Status:   Future     Standing Expiration Date:   3/18/2021   • Comprehensive Metabolic Panel   • Protein Elec + Interp, Serum     One week prior     Standing Status:   Future     Number of Occurrences:   1     Standing Expiration Date:   3/18/2021     Scheduling Instructions:      Please draw these labs prior to the next visit.   • Immunoglobulin Free LT Chains Blood     One week prior     Standing Status:   Future     Number of Occurrences:   1     Standing Expiration Date:   3/18/2021   • IgG, IgA, IgM     One week prior     Standing Status:   Future     Number of Occurrences:   1     Standing Expiration Date:   3/18/2021   • CBC & Differential     Standing Status:   Future     Number of Occurrences:   1     Standing Expiration Date:   3/18/2021     Order Specific Question:   Manual Differential     Answer:   No                   Thank you very much for providing the opportunity to participate in this patient’s care. Please do not hesitate to call if there are  any other questions.    I have reviewed all relevant labs results, outside reports, imaging, notes, vital signs, medications, and plan with the patient today.    Portions of the note have been scribed by medical assistant/Nurse.  I have reviewed and made changes accordingly.        Electronically signed by Shabbir Preciado MD, 03/18/20, 12:50 PM.

## 2020-03-18 ENCOUNTER — OFFICE VISIT (OUTPATIENT)
Dept: LAB | Facility: HOSPITAL | Age: 35
End: 2020-03-18

## 2020-03-18 ENCOUNTER — OFFICE VISIT (OUTPATIENT)
Dept: ONCOLOGY | Facility: CLINIC | Age: 35
End: 2020-03-18

## 2020-03-18 VITALS
BODY MASS INDEX: 28.64 KG/M2 | SYSTOLIC BLOOD PRESSURE: 133 MMHG | TEMPERATURE: 98.1 F | HEIGHT: 74 IN | WEIGHT: 223.2 LBS | RESPIRATION RATE: 18 BRPM | DIASTOLIC BLOOD PRESSURE: 80 MMHG | HEART RATE: 77 BPM

## 2020-03-18 DIAGNOSIS — D47.2 MONOCLONAL GAMMOPATHY: Primary | ICD-10-CM

## 2020-03-18 DIAGNOSIS — D75.1 POLYCYTHEMIA: ICD-10-CM

## 2020-03-18 DIAGNOSIS — D47.2 MONOCLONAL GAMMOPATHY: ICD-10-CM

## 2020-03-18 LAB
ALBUMIN SERPL-MCNC: 4.5 G/DL (ref 3.5–5.2)
ALBUMIN/GLOB SERPL: 1.2 G/DL
ALP SERPL-CCNC: 60 U/L (ref 39–117)
ALT SERPL W P-5'-P-CCNC: 96 U/L (ref 1–41)
ANION GAP SERPL CALCULATED.3IONS-SCNC: 11 MMOL/L (ref 5–15)
AST SERPL-CCNC: 89 U/L (ref 1–40)
BASOPHILS # BLD AUTO: 0.02 10*3/MM3 (ref 0–0.2)
BASOPHILS NFR BLD AUTO: 0.3 % (ref 0–1.5)
BILIRUB SERPL-MCNC: 0.6 MG/DL (ref 0.2–1.2)
BUN BLD-MCNC: 12 MG/DL (ref 6–20)
BUN/CREAT SERPL: 12.4 (ref 7–25)
CALCIUM SPEC-SCNC: 9.5 MG/DL (ref 8.6–10.5)
CHLORIDE SERPL-SCNC: 101 MMOL/L (ref 98–107)
CO2 SERPL-SCNC: 26 MMOL/L (ref 22–29)
CREAT BLD-MCNC: 0.97 MG/DL (ref 0.76–1.27)
DEPRECATED RDW RBC AUTO: 45.6 FL (ref 37–54)
EOSINOPHIL # BLD AUTO: 0.19 10*3/MM3 (ref 0–0.4)
EOSINOPHIL NFR BLD AUTO: 2.5 % (ref 0.3–6.2)
ERYTHROCYTE [DISTWIDTH] IN BLOOD BY AUTOMATED COUNT: 13.9 % (ref 12.3–15.4)
GFR SERPL CREATININE-BSD FRML MDRD: 89 ML/MIN/1.73
GLOBULIN UR ELPH-MCNC: 3.9 GM/DL
GLUCOSE BLD-MCNC: 95 MG/DL (ref 65–99)
HCT VFR BLD AUTO: 44.1 % (ref 37.5–51)
HGB BLD-MCNC: 16 G/DL (ref 13–17.7)
IGA1 MFR SER: 108 MG/DL (ref 70–400)
IGG1 SER-MCNC: 2304 MG/DL (ref 700–1600)
IGM SERPL-MCNC: 53 MG/DL (ref 40–230)
LYMPHOCYTES # BLD AUTO: 2.41 10*3/MM3 (ref 0.7–3.1)
LYMPHOCYTES NFR BLD AUTO: 31.3 % (ref 19.6–45.3)
MCH RBC QN AUTO: 34.1 PG (ref 26.6–33)
MCHC RBC AUTO-ENTMCNC: 36.3 G/DL (ref 31.5–35.7)
MCV RBC AUTO: 94 FL (ref 79–97)
MONOCYTES # BLD AUTO: 0.72 10*3/MM3 (ref 0.1–0.9)
MONOCYTES NFR BLD AUTO: 9.4 % (ref 5–12)
NEUTROPHILS # BLD AUTO: 4.36 10*3/MM3 (ref 1.7–7)
NEUTROPHILS NFR BLD AUTO: 56.5 % (ref 42.7–76)
PLATELET # BLD AUTO: 202 10*3/MM3 (ref 140–450)
PMV BLD AUTO: 10.7 FL (ref 6–12)
POTASSIUM BLD-SCNC: 4.4 MMOL/L (ref 3.5–5.2)
PROT SERPL-MCNC: 8.4 G/DL (ref 6–8.5)
RBC # BLD AUTO: 4.69 10*6/MM3 (ref 4.14–5.8)
SODIUM BLD-SCNC: 138 MMOL/L (ref 136–145)
WBC NRBC COR # BLD: 7.7 10*3/MM3 (ref 3.4–10.8)

## 2020-03-18 PROCEDURE — 99215 OFFICE O/P EST HI 40 MIN: CPT | Performed by: INTERNAL MEDICINE

## 2020-03-18 PROCEDURE — 85025 COMPLETE CBC W/AUTO DIFF WBC: CPT

## 2020-03-18 PROCEDURE — 36415 COLL VENOUS BLD VENIPUNCTURE: CPT | Performed by: INTERNAL MEDICINE

## 2020-03-18 PROCEDURE — 80053 COMPREHEN METABOLIC PANEL: CPT | Performed by: NURSE PRACTITIONER

## 2020-03-18 PROCEDURE — 82784 ASSAY IGA/IGD/IGG/IGM EACH: CPT | Performed by: NURSE PRACTITIONER

## 2020-03-19 LAB
ALBUMIN SERPL-MCNC: 3.9 G/DL (ref 2.9–4.4)
ALBUMIN/GLOB SERPL: 1 {RATIO} (ref 0.7–1.7)
ALPHA1 GLOB FLD ELPH-MCNC: 0.2 G/DL (ref 0–0.4)
ALPHA2 GLOB SERPL ELPH-MCNC: 0.7 G/DL (ref 0.4–1)
B-GLOBULIN SERPL ELPH-MCNC: 0.9 G/DL (ref 0.7–1.3)
GAMMA GLOB SERPL ELPH-MCNC: 2 G/DL (ref 0.4–1.8)
GLOBULIN SER CALC-MCNC: 3.9 G/DL (ref 2.2–3.9)
KAPPA LC SERPL-MCNC: 55.8 MG/L (ref 3.3–19.4)
KAPPA LC/LAMBDA SER: 6.8 {RATIO} (ref 0.26–1.65)
LAMBDA LC FREE SERPL-MCNC: 8.2 MG/L (ref 5.7–26.3)
Lab: ABNORMAL
M-SPIKE: 1.7 G/DL
PROT PATTERN SERPL ELPH-IMP: ABNORMAL
PROT SERPL-MCNC: 7.8 G/DL (ref 6–8.5)

## 2020-04-28 ENCOUNTER — ANESTHESIA EVENT (OUTPATIENT)
Dept: GASTROENTEROLOGY | Facility: HOSPITAL | Age: 35
End: 2020-04-28

## 2020-04-28 ENCOUNTER — HOSPITAL ENCOUNTER (OUTPATIENT)
Facility: HOSPITAL | Age: 35
Setting detail: HOSPITAL OUTPATIENT SURGERY
Discharge: HOME | End: 2020-04-28
Attending: INTERNAL MEDICINE | Admitting: INTERNAL MEDICINE

## 2020-04-28 ENCOUNTER — ANESTHESIA (OUTPATIENT)
Dept: GASTROENTEROLOGY | Facility: HOSPITAL | Age: 35
End: 2020-04-28

## 2020-04-28 ENCOUNTER — ON CAMPUS - OUTPATIENT (AMBULATORY)
Dept: URBAN - METROPOLITAN AREA HOSPITAL 85 | Facility: HOSPITAL | Age: 35
End: 2020-04-28

## 2020-04-28 VITALS
HEIGHT: 74 IN | WEIGHT: 210 LBS | DIASTOLIC BLOOD PRESSURE: 87 MMHG | BODY MASS INDEX: 26.95 KG/M2 | SYSTOLIC BLOOD PRESSURE: 130 MMHG | HEART RATE: 64 BPM | OXYGEN SATURATION: 100 % | RESPIRATION RATE: 16 BRPM

## 2020-04-28 DIAGNOSIS — K22.70 BARRETT'S ESOPHAGUS: ICD-10-CM

## 2020-04-28 DIAGNOSIS — K76.89 OTHER SPECIFIED DISEASES OF LIVER: ICD-10-CM

## 2020-04-28 DIAGNOSIS — R74.8 ELEVATED LIVER ENZYMES: ICD-10-CM

## 2020-04-28 DIAGNOSIS — R74.8 ABNORMAL LEVELS OF OTHER SERUM ENZYMES: ICD-10-CM

## 2020-04-28 DIAGNOSIS — K44.9 DIAPHRAGMATIC HERNIA WITHOUT OBSTRUCTION OR GANGRENE: ICD-10-CM

## 2020-04-28 DIAGNOSIS — K22.70 BARRETT'S ESOPHAGUS WITHOUT DYSPLASIA: ICD-10-CM

## 2020-04-28 PROCEDURE — 25010000002 MIDAZOLAM PER 1 MG: Performed by: ANESTHESIOLOGY

## 2020-04-28 PROCEDURE — 43239 EGD BIOPSY SINGLE/MULTIPLE: CPT | Mod: 59 | Performed by: INTERNAL MEDICINE

## 2020-04-28 PROCEDURE — 88305 TISSUE EXAM BY PATHOLOGIST: CPT | Performed by: INTERNAL MEDICINE

## 2020-04-28 PROCEDURE — 43238 EGD US FINE NEEDLE BX/ASPIR: CPT | Performed by: INTERNAL MEDICINE

## 2020-04-28 PROCEDURE — 25010000002 FENTANYL CITRATE (PF) 100 MCG/2ML SOLUTION: Performed by: ANESTHESIOLOGY

## 2020-04-28 PROCEDURE — 88307 TISSUE EXAM BY PATHOLOGIST: CPT | Performed by: INTERNAL MEDICINE

## 2020-04-28 PROCEDURE — 25010000002 PROPOFOL 10 MG/ML EMULSION: Performed by: ANESTHESIOLOGY

## 2020-04-28 PROCEDURE — 88313 SPECIAL STAINS GROUP 2: CPT | Performed by: INTERNAL MEDICINE

## 2020-04-28 RX ORDER — SODIUM CHLORIDE 9 MG/ML
9 INJECTION, SOLUTION INTRAVENOUS CONTINUOUS PRN
Status: DISCONTINUED | OUTPATIENT
Start: 2020-04-28 | End: 2020-04-28 | Stop reason: HOSPADM

## 2020-04-28 RX ORDER — LIDOCAINE HYDROCHLORIDE 10 MG/ML
INJECTION, SOLUTION EPIDURAL; INFILTRATION; INTRACAUDAL; PERINEURAL AS NEEDED
Status: DISCONTINUED | OUTPATIENT
Start: 2020-04-28 | End: 2020-04-28 | Stop reason: SURG

## 2020-04-28 RX ORDER — WATER 1000 ML/1000ML
INJECTION, SOLUTION INTRAVENOUS
Status: DISCONTINUED
Start: 2020-04-28 | End: 2020-04-28 | Stop reason: HOSPADM

## 2020-04-28 RX ORDER — DOXYCYCLINE 100 MG/10ML
INJECTION, POWDER, LYOPHILIZED, FOR SOLUTION INTRAVENOUS
Status: DISCONTINUED
Start: 2020-04-28 | End: 2020-04-28 | Stop reason: HOSPADM

## 2020-04-28 RX ORDER — PROPOFOL 10 MG/ML
VIAL (ML) INTRAVENOUS AS NEEDED
Status: DISCONTINUED | OUTPATIENT
Start: 2020-04-28 | End: 2020-04-28

## 2020-04-28 RX ORDER — MIDAZOLAM HYDROCHLORIDE 1 MG/ML
INJECTION INTRAMUSCULAR; INTRAVENOUS AS NEEDED
Status: DISCONTINUED | OUTPATIENT
Start: 2020-04-28 | End: 2020-04-28 | Stop reason: SURG

## 2020-04-28 RX ORDER — SODIUM CHLORIDE 0.9 % (FLUSH) 0.9 %
10 SYRINGE (ML) INJECTION EVERY 12 HOURS SCHEDULED
Status: DISCONTINUED | OUTPATIENT
Start: 2020-04-28 | End: 2020-04-28 | Stop reason: HOSPADM

## 2020-04-28 RX ORDER — SODIUM CHLORIDE 0.9 % (FLUSH) 0.9 %
10 SYRINGE (ML) INJECTION AS NEEDED
Status: DISCONTINUED | OUTPATIENT
Start: 2020-04-28 | End: 2020-04-28 | Stop reason: HOSPADM

## 2020-04-28 RX ORDER — PROPOFOL 10 MG/ML
VIAL (ML) INTRAVENOUS AS NEEDED
Status: DISCONTINUED | OUTPATIENT
Start: 2020-04-28 | End: 2020-04-28 | Stop reason: SURG

## 2020-04-28 RX ORDER — FENTANYL CITRATE 50 UG/ML
INJECTION, SOLUTION INTRAMUSCULAR; INTRAVENOUS AS NEEDED
Status: DISCONTINUED | OUTPATIENT
Start: 2020-04-28 | End: 2020-04-28 | Stop reason: SURG

## 2020-04-28 RX ORDER — HEPARIN SODIUM 5000 [USP'U]/ML
INJECTION, SOLUTION INTRAVENOUS; SUBCUTANEOUS
Status: DISCONTINUED
Start: 2020-04-28 | End: 2020-04-28 | Stop reason: WASHOUT

## 2020-04-28 RX ORDER — GLYCOPYRROLATE 0.2 MG/ML
INJECTION INTRAMUSCULAR; INTRAVENOUS AS NEEDED
Status: DISCONTINUED | OUTPATIENT
Start: 2020-04-28 | End: 2020-04-28 | Stop reason: SURG

## 2020-04-28 RX ADMIN — FENTANYL CITRATE 25 MCG: 50 INJECTION, SOLUTION INTRAMUSCULAR; INTRAVENOUS at 10:16

## 2020-04-28 RX ADMIN — PROPOFOL 100 MG: 10 INJECTION, EMULSION INTRAVENOUS at 10:09

## 2020-04-28 RX ADMIN — PROPOFOL 50 MG: 10 INJECTION, EMULSION INTRAVENOUS at 10:10

## 2020-04-28 RX ADMIN — GLYCOPYRROLATE 0.2 MG: 0.2 INJECTION, SOLUTION INTRAMUSCULAR; INTRAVENOUS at 10:09

## 2020-04-28 RX ADMIN — PROPOFOL 100 MCG/KG/MIN: 10 INJECTION, EMULSION INTRAVENOUS at 10:09

## 2020-04-28 RX ADMIN — LIDOCAINE HYDROCHLORIDE 100 MG: 10 INJECTION, SOLUTION EPIDURAL; INFILTRATION; INTRACAUDAL; PERINEURAL at 10:09

## 2020-04-28 RX ADMIN — METRONIDAZOLE 500 MG: 500 INJECTION, SOLUTION INTRAVENOUS at 10:13

## 2020-04-28 RX ADMIN — FENTANYL CITRATE 50 MCG: 50 INJECTION, SOLUTION INTRAMUSCULAR; INTRAVENOUS at 10:09

## 2020-04-28 RX ADMIN — FENTANYL CITRATE 25 MCG: 50 INJECTION, SOLUTION INTRAMUSCULAR; INTRAVENOUS at 10:13

## 2020-04-28 RX ADMIN — MIDAZOLAM 2 MG: 1 INJECTION INTRAMUSCULAR; INTRAVENOUS at 09:47

## 2020-04-28 NOTE — ANESTHESIA POSTPROCEDURE EVALUATION
Patient: Eleazar Jeong    Procedure Summary     Date:  04/28/20 Room / Location:  [facility] ENDOSCOPY 2 / [facility] ENDOSCOPY    Anesthesia Start:  0943 Anesthesia Stop:  1035    Procedure:  Esophagogastroduodenoscopy and endoscopic ultrasound with biopsy of liver, biopsy of esophagus (N/A ) Diagnosis:       Elevated liver enzymes      Matson's esophagus      (Elevated liver enzymes [R74.8])      (Matson's esophagus [K22.70])    Surgeon:  Danilo Balderrama MD Provider:  Gilberto Vergara MD    Anesthesia Type:  MAC ASA Status:  3          Anesthesia Type: MAC    Vitals  Vitals Value Taken Time   /77 4/28/2020 12:24 PM   Temp     Pulse 72 4/28/2020 12:26 PM   Resp 16 4/28/2020 12:15 PM   SpO2 97 % 4/28/2020 12:24 PM   Vitals shown include unvalidated device data.        Post Anesthesia Care and Evaluation    Patient location during evaluation: PACU  Patient participation: complete - patient participated  Level of consciousness: awake  Pain scale: See nurse's notes for pain score.  Pain management: adequate  Airway patency: patent  Anesthetic complications: No anesthetic complications  PONV Status: none  Cardiovascular status: acceptable  Respiratory status: acceptable  Hydration status: acceptable    Comments: Patient seen and examined postoperatively; vital signs stable; SpO2 greater than or equal to 90%; cardiopulmonary status stable; nausea/vomiting adequately controlled; pain adequately controlled; no apparent anesthesia complications; patient discharged from anesthesia care when discharge criteria were met

## 2020-04-28 NOTE — ANESTHESIA PREPROCEDURE EVALUATION
Anesthesia Evaluation     Patient summary reviewed and Nursing notes reviewed   NPO Solid Status: > 8 hours  NPO Liquid Status: > 8 hours           Airway   Mallampati: II  TM distance: >3 FB  Neck ROM: full  No difficulty expected  Dental - normal exam     Pulmonary - negative pulmonary ROS and normal exam   Cardiovascular - normal exam    (+) hyperlipidemia,       Neuro/Psych  (+) headaches, tremors,     GI/Hepatic/Renal/Endo    (+)  GERD,      Musculoskeletal (-) negative ROS    Abdominal  - normal exam    Bowel sounds: normal.   Substance History - negative use     OB/GYN negative ob/gyn ROS         Other      history of cancer                  Anesthesia Plan    ASA 3     MAC     intravenous induction     Anesthetic plan, all risks, benefits, and alternatives have been provided, discussed and informed consent has been obtained with: patient.

## 2020-04-30 PROBLEM — K22.70 BARRETT'S ESOPHAGUS: Status: ACTIVE | Noted: 2020-04-30

## 2020-05-08 ENCOUNTER — TELEHEALTH PROVIDED OTHER THAN IN PATIENT'S HOME (AMBULATORY)
Dept: URBAN - METROPOLITAN AREA TELEHEALTH 4 | Facility: TELEHEALTH | Age: 35
End: 2020-05-08
Payer: COMMERCIAL

## 2020-05-08 VITALS — HEIGHT: 74 IN

## 2020-05-08 DIAGNOSIS — R94.5 ABNORMAL RESULTS OF LIVER FUNCTION STUDIES: ICD-10-CM

## 2020-05-08 DIAGNOSIS — K59.1 FUNCTIONAL DIARRHEA: ICD-10-CM

## 2020-05-08 PROCEDURE — 99441: CPT | Performed by: INTERNAL MEDICINE

## 2020-06-03 ENCOUNTER — LAB (OUTPATIENT)
Dept: LAB | Facility: HOSPITAL | Age: 35
End: 2020-06-03

## 2020-06-03 DIAGNOSIS — D47.2 MONOCLONAL GAMMOPATHY: Primary | ICD-10-CM

## 2020-06-03 DIAGNOSIS — D75.1 POLYCYTHEMIA: ICD-10-CM

## 2020-06-03 LAB
ALBUMIN SERPL-MCNC: 4.4 G/DL (ref 3.5–5.2)
ALBUMIN/GLOB SERPL: 1.2 G/DL
ALP SERPL-CCNC: 61 U/L (ref 39–117)
ALT SERPL W P-5'-P-CCNC: 33 U/L (ref 1–41)
ANION GAP SERPL CALCULATED.3IONS-SCNC: 10 MMOL/L (ref 5–15)
AST SERPL-CCNC: 24 U/L (ref 1–40)
BASOPHILS # BLD AUTO: 0.02 10*3/MM3 (ref 0–0.2)
BASOPHILS NFR BLD AUTO: 0.3 % (ref 0–1.5)
BILIRUB SERPL-MCNC: 0.6 MG/DL (ref 0.2–1.2)
BUN BLD-MCNC: 14 MG/DL (ref 6–20)
BUN/CREAT SERPL: 13.6 (ref 7–25)
CALCIUM SPEC-SCNC: 9.2 MG/DL (ref 8.6–10.5)
CHLORIDE SERPL-SCNC: 103 MMOL/L (ref 98–107)
CO2 SERPL-SCNC: 23 MMOL/L (ref 22–29)
CREAT BLD-MCNC: 1.03 MG/DL (ref 0.76–1.27)
DEPRECATED RDW RBC AUTO: 43 FL (ref 37–54)
EOSINOPHIL # BLD AUTO: 0.12 10*3/MM3 (ref 0–0.4)
EOSINOPHIL NFR BLD AUTO: 1.7 % (ref 0.3–6.2)
ERYTHROCYTE [DISTWIDTH] IN BLOOD BY AUTOMATED COUNT: 12.7 % (ref 12.3–15.4)
GFR SERPL CREATININE-BSD FRML MDRD: 82 ML/MIN/1.73
GLOBULIN UR ELPH-MCNC: 3.7 GM/DL
GLUCOSE BLD-MCNC: 119 MG/DL (ref 65–99)
HCT VFR BLD AUTO: 41.8 % (ref 37.5–51)
HGB BLD-MCNC: 14.9 G/DL (ref 13–17.7)
LYMPHOCYTES # BLD AUTO: 2.03 10*3/MM3 (ref 0.7–3.1)
LYMPHOCYTES NFR BLD AUTO: 29.2 % (ref 19.6–45.3)
MCH RBC QN AUTO: 33.9 PG (ref 26.6–33)
MCHC RBC AUTO-ENTMCNC: 35.6 G/DL (ref 31.5–35.7)
MCV RBC AUTO: 95.2 FL (ref 79–97)
MONOCYTES # BLD AUTO: 0.53 10*3/MM3 (ref 0.1–0.9)
MONOCYTES NFR BLD AUTO: 7.6 % (ref 5–12)
NEUTROPHILS # BLD AUTO: 4.26 10*3/MM3 (ref 1.7–7)
NEUTROPHILS NFR BLD AUTO: 61.2 % (ref 42.7–76)
PLATELET # BLD AUTO: 220 10*3/MM3 (ref 140–450)
PMV BLD AUTO: 10.5 FL (ref 6–12)
POTASSIUM BLD-SCNC: 3.6 MMOL/L (ref 3.5–5.2)
PROT SERPL-MCNC: 8.1 G/DL (ref 6–8.5)
RBC # BLD AUTO: 4.39 10*6/MM3 (ref 4.14–5.8)
SODIUM BLD-SCNC: 136 MMOL/L (ref 136–145)
WBC NRBC COR # BLD: 6.96 10*3/MM3 (ref 3.4–10.8)

## 2020-06-03 PROCEDURE — 85025 COMPLETE CBC W/AUTO DIFF WBC: CPT

## 2020-06-03 PROCEDURE — 80053 COMPREHEN METABOLIC PANEL: CPT | Performed by: NURSE PRACTITIONER

## 2020-06-03 PROCEDURE — 36415 COLL VENOUS BLD VENIPUNCTURE: CPT

## 2020-06-10 ENCOUNTER — LAB (OUTPATIENT)
Dept: LAB | Facility: HOSPITAL | Age: 35
End: 2020-06-10

## 2020-06-10 ENCOUNTER — OFFICE VISIT (OUTPATIENT)
Dept: ONCOLOGY | Facility: CLINIC | Age: 35
End: 2020-06-10

## 2020-06-10 VITALS
BODY MASS INDEX: 27.54 KG/M2 | HEART RATE: 73 BPM | WEIGHT: 214.6 LBS | TEMPERATURE: 98.6 F | SYSTOLIC BLOOD PRESSURE: 145 MMHG | RESPIRATION RATE: 16 BRPM | HEIGHT: 74 IN | DIASTOLIC BLOOD PRESSURE: 81 MMHG

## 2020-06-10 DIAGNOSIS — D47.2 MONOCLONAL GAMMOPATHY: Primary | ICD-10-CM

## 2020-06-10 DIAGNOSIS — D47.2 MONOCLONAL GAMMOPATHY: ICD-10-CM

## 2020-06-10 DIAGNOSIS — D75.1 POLYCYTHEMIA: ICD-10-CM

## 2020-06-10 LAB
BASOPHILS # BLD AUTO: 0.03 10*3/MM3 (ref 0–0.2)
BASOPHILS NFR BLD AUTO: 0.4 % (ref 0–1.5)
DEPRECATED RDW RBC AUTO: 43.3 FL (ref 37–54)
EOSINOPHIL # BLD AUTO: 0.15 10*3/MM3 (ref 0–0.4)
EOSINOPHIL NFR BLD AUTO: 1.8 % (ref 0.3–6.2)
ERYTHROCYTE [DISTWIDTH] IN BLOOD BY AUTOMATED COUNT: 12.8 % (ref 12.3–15.4)
HCT VFR BLD AUTO: 42.1 % (ref 37.5–51)
HGB BLD-MCNC: 15.1 G/DL (ref 13–17.7)
IGG1 SER-MCNC: 2242 MG/DL (ref 700–1600)
LYMPHOCYTES # BLD AUTO: 2.58 10*3/MM3 (ref 0.7–3.1)
LYMPHOCYTES NFR BLD AUTO: 30.8 % (ref 19.6–45.3)
MCH RBC QN AUTO: 34 PG (ref 26.6–33)
MCHC RBC AUTO-ENTMCNC: 35.9 G/DL (ref 31.5–35.7)
MCV RBC AUTO: 94.8 FL (ref 79–97)
MONOCYTES # BLD AUTO: 0.65 10*3/MM3 (ref 0.1–0.9)
MONOCYTES NFR BLD AUTO: 7.7 % (ref 5–12)
NEUTROPHILS # BLD AUTO: 4.98 10*3/MM3 (ref 1.7–7)
NEUTROPHILS NFR BLD AUTO: 59.3 % (ref 42.7–76)
PLATELET # BLD AUTO: 212 10*3/MM3 (ref 140–450)
PMV BLD AUTO: 10.8 FL (ref 6–12)
RBC # BLD AUTO: 4.44 10*6/MM3 (ref 4.14–5.8)
WBC NRBC COR # BLD: 8.39 10*3/MM3 (ref 3.4–10.8)

## 2020-06-10 PROCEDURE — 82784 ASSAY IGA/IGD/IGG/IGM EACH: CPT | Performed by: INTERNAL MEDICINE

## 2020-06-10 PROCEDURE — 99214 OFFICE O/P EST MOD 30 MIN: CPT | Performed by: INTERNAL MEDICINE

## 2020-06-10 PROCEDURE — 36415 COLL VENOUS BLD VENIPUNCTURE: CPT | Performed by: INTERNAL MEDICINE

## 2020-06-10 PROCEDURE — 85025 COMPLETE CBC W/AUTO DIFF WBC: CPT | Performed by: INTERNAL MEDICINE

## 2020-06-10 NOTE — PROGRESS NOTES
Called Darrick in Pathology to let her know that we have entered orders for Myeloma Fish panel. Darrick says that she is unsure if the specimen will be available to run the test on. She will check and let me know.

## 2020-06-11 LAB
ALBUMIN SERPL-MCNC: 4 G/DL (ref 2.9–4.4)
ALBUMIN/GLOB SERPL: 1 {RATIO} (ref 0.7–1.7)
ALPHA1 GLOB FLD ELPH-MCNC: 0.3 G/DL (ref 0–0.4)
ALPHA2 GLOB SERPL ELPH-MCNC: 0.8 G/DL (ref 0.4–1)
B-GLOBULIN SERPL ELPH-MCNC: 1 G/DL (ref 0.7–1.3)
GAMMA GLOB SERPL ELPH-MCNC: 2.1 G/DL (ref 0.4–1.8)
GLOBULIN SER CALC-MCNC: 4.2 G/DL (ref 2.2–3.9)
KAPPA LC SERPL-MCNC: 51.2 MG/L (ref 3.3–19.4)
KAPPA LC/LAMBDA SER: 4.53 {RATIO} (ref 0.26–1.65)
LAMBDA LC FREE SERPL-MCNC: 11.3 MG/L (ref 5.7–26.3)
Lab: ABNORMAL
M-SPIKE: 1.8 G/DL
PROT PATTERN SERPL ELPH-IMP: ABNORMAL
PROT SERPL-MCNC: 8.2 G/DL (ref 6–8.5)

## 2020-06-15 ENCOUNTER — TELEPHONE (OUTPATIENT)
Dept: ONCOLOGY | Facility: CLINIC | Age: 35
End: 2020-06-15

## 2020-06-15 NOTE — TELEPHONE ENCOUNTER
I called and left a message notifying the patient that his labs are stable and that he can call the office with any questions or concerns.

## 2020-06-15 NOTE — TELEPHONE ENCOUNTER
----- Message from Shabbir Preciado MD sent at 6/12/2020  3:51 PM EDT -----  Please let him know that his labs look stable.  So we will just follow-up as scheduled.

## 2020-06-19 ENCOUNTER — OFFICE (AMBULATORY)
Dept: URBAN - METROPOLITAN AREA CLINIC 64 | Facility: CLINIC | Age: 35
End: 2020-06-19

## 2020-06-19 VITALS
WEIGHT: 214 LBS | DIASTOLIC BLOOD PRESSURE: 86 MMHG | HEART RATE: 82 BPM | HEIGHT: 74 IN | SYSTOLIC BLOOD PRESSURE: 138 MMHG

## 2020-06-19 DIAGNOSIS — F17.210 NICOTINE DEPENDENCE, CIGARETTES, UNCOMPLICATED: ICD-10-CM

## 2020-06-19 DIAGNOSIS — R10.12 LEFT UPPER QUADRANT PAIN: ICD-10-CM

## 2020-06-19 DIAGNOSIS — K59.1 FUNCTIONAL DIARRHEA: ICD-10-CM

## 2020-06-19 DIAGNOSIS — R11.0 NAUSEA: ICD-10-CM

## 2020-06-19 DIAGNOSIS — R94.5 ABNORMAL RESULTS OF LIVER FUNCTION STUDIES: ICD-10-CM

## 2020-06-19 PROCEDURE — 99214 OFFICE O/P EST MOD 30 MIN: CPT | Performed by: INTERNAL MEDICINE

## 2020-06-19 RX ORDER — DICYCLOMINE HYDROCHLORIDE 10 MG/1
30 CAPSULE ORAL
Qty: 90 | Refills: 11 | Status: COMPLETED
Start: 2020-06-19 | End: 2020-09-22

## 2020-06-19 NOTE — SERVICEHPINOTES
This is a 34 y/o WM who presents for f/u.  He has a h/o abnormal LFT's, idiopathic, with normal liver bx.He had a spike of AST to 600 around the time of his BM bx in March. He thinks the labs were drawn prior to his BM bx.He has a h/o diarrhea.  Advised to d/c soda consumption. He has decreased soda consumption.  Resolved a few days after VC, so he did not have stool studies done. He has not taken colestipol in a few days because he has not needed it. He c/o chronic nausea and chronic LUQ pain which radiates from front to back. Worse with BM if abd pain in front, worse iwht urinating if pain in back.  "I do not vomit." He had CT scan renal stone protocol last year and cysto.  He was referred to  for tertiary referral of his monoclonal gammopathy but his appt was postponed due to coronavirus. He has not requested an appt yet because "I've been making a lot of phone calls these days."  he also carries a dx of Polycythemia. Liver bx via EUS 4/20 - liver normalLabs:BR6/20 - normal LFT, CK, TSH. BR3/20 - ,  otw normal LFTBR12/19 alt 65 ast 57, hepatitis panel [id number]/19 cmp qqayhprzokojEQ28/18 - ALT 50 otw normal LFTBR8/19 - ALT 66 otw normal CMPBR5/18 - Celiac panel neg. Repeat viral hep panel sent in error LFT's were ordered. Viral hep neg. BR2/18 - ALT 72 otw normal LFTBR2/18 - Biofire +EPEC E.gfdlJY48/17 - hepatic serologies neg. BR9/17 - AST 62, ALT 89, otw normal CMP, zoila/lipaseRUQ USN 9/17 - normalEUS with liver bx 4/20 - esophageal bx nondysplastic BE. BREGD 12/17 - 3cm BE non dysplastic, antral gastritis neg h.pylori. Recall 2020.BRColonoscopy 3/17 - 2 polyps (TA, HP), normal to TI, int hems. Random colon bx's neg microscopic colitis. Recall colon 2022

## 2020-07-15 ENCOUNTER — TELEPHONE (OUTPATIENT)
Dept: FAMILY MEDICINE CLINIC | Facility: CLINIC | Age: 35
End: 2020-07-15

## 2020-07-15 NOTE — TELEPHONE ENCOUNTER
PT STATES HE IS  WAITING ON A HEM/ONC REFERRAL TO [facility]. I SEE THE ONE FOR DR BURTON, FROM FEBRUARY, BUT HE DOESN'T WANT HIM. HE WANTS TO SEE SOMEONE ELSE. PLEASE ENTER, I HAVE ALL THE INFO

## 2020-07-15 NOTE — TELEPHONE ENCOUNTER
NEVERMIND, I FOUND THE NOTE WHERE I SUBMITTED THE REFERRAL ONLINE IN FEB. I GUESS THEY NEVER CALLED HIM. I WILL FAX REFERRAL AND DOCUMENTS

## 2020-08-10 ENCOUNTER — OFFICE VISIT (OUTPATIENT)
Dept: FAMILY MEDICINE CLINIC | Facility: CLINIC | Age: 35
End: 2020-08-10

## 2020-08-10 VITALS
SYSTOLIC BLOOD PRESSURE: 131 MMHG | HEART RATE: 76 BPM | DIASTOLIC BLOOD PRESSURE: 88 MMHG | OXYGEN SATURATION: 100 % | BODY MASS INDEX: 27.73 KG/M2 | TEMPERATURE: 98.2 F | WEIGHT: 216 LBS

## 2020-08-10 DIAGNOSIS — D47.2 MGUS (MONOCLONAL GAMMOPATHY OF UNKNOWN SIGNIFICANCE): ICD-10-CM

## 2020-08-10 DIAGNOSIS — L98.9 SKIN LESIONS: ICD-10-CM

## 2020-08-10 DIAGNOSIS — R22.1 MASS OF LEFT SIDE OF NECK: Primary | ICD-10-CM

## 2020-08-10 PROBLEM — E88.09 POEMS SYNDROME: Status: ACTIVE | Noted: 2020-08-06

## 2020-08-10 PROBLEM — G90.9 AUTONOMIC DYSFUNCTION: Status: ACTIVE | Noted: 2020-08-06

## 2020-08-10 PROCEDURE — 99214 OFFICE O/P EST MOD 30 MIN: CPT | Performed by: PHYSICIAN ASSISTANT

## 2020-08-10 RX ORDER — LAMOTRIGINE 25 MG/1
25 TABLET ORAL DAILY
COMMUNITY
Start: 2020-08-06 | End: 2020-12-09

## 2020-08-10 NOTE — PROGRESS NOTES
Subjective  Eleazar Jeong is a 35 y.o. male     History of Present Illness  Patient is a 35-year-old white male here for six-month follow-up on MGUS.  He states he has been seeing hematology/oncology of [facility], a PET scan and bone marrow biopsy have been scheduled for August 21.  He is frustrated that not much progress has been made regarding his symptoms of mental fogginess, dizziness, loss of appetite.  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    New problem: Left ear pain: Patient complains of left ear pain for the past 1 month.  He states he was started on an antibiotic initially from a [facility] clinic, and then he went back because he was not better and received a prescription for doxycycline.  He states he is still having pain and discomfort in the ear, he plans to follow-up with ENT as soon as he can, however he cannot keep missing work.    The following portions of the patient's history were reviewed and updated as appropriate: allergies, current medications, past family history, past medical history, past social history, past surgical history and problem list.    Review of Systems   Constitutional: Positive for fatigue. Negative for fever and unexpected weight loss.   HENT: Positive for ear pain (left). Negative for sore throat.    Eyes: Negative for blurred vision and pain.   Respiratory: Negative for shortness of breath.    Cardiovascular: Negative for chest pain.   Gastrointestinal: Positive for abdominal pain. Negative for blood in stool, diarrhea, nausea and vomiting.   Genitourinary: Negative for dysuria.   Musculoskeletal: Negative for joint swelling.   Neurological: Positive for dizziness. Negative for seizures and confusion.        Foggy memory   Psychiatric/Behavioral: Negative for suicidal ideas and depressed mood. The patient is not nervous/anxious.        Objective  Physical Exam   Constitutional: He is oriented to person,  place, and time. He appears well-developed and well-nourished.   HENT:   Head: Normocephalic and atraumatic.   Right Ear: External ear normal.   Left Ear: External ear normal.   Nose: Nose normal.   Mouth/Throat: Oropharynx is clear and moist.   Fluid and bubbles appreciated behind the eardrums bilaterally.   Eyes: Pupils are equal, round, and reactive to light. Conjunctivae and EOM are normal.   Neck: Normal range of motion. Neck supple.   Cardiovascular: Normal rate, regular rhythm and normal heart sounds.   Pulmonary/Chest: Effort normal and breath sounds normal.   Abdominal: Soft. Bowel sounds are normal.   Musculoskeletal: Normal range of motion.   Neurological: He is alert and oriented to person, place, and time.   Psychiatric: He has a normal mood and affect. His behavior is normal.       Vitals:    08/10/20 0829   BP: 131/88   BP Location: Right arm   Patient Position: Sitting   Cuff Size: Adult   Pulse: 76   Temp: 98.2 °F (36.8 °C)   TempSrc: Oral   SpO2: 100%   Weight: 98 kg (216 lb)     Body mass index is 27.73 kg/m².    PHQ-9 Total Score:        Assessment/Plan  Diagnoses and all orders for this visit:    1. Mass of left side of neck (Primary)  Comments:  New, CT scan of the soft tissues of the neck ordered.  Will refer as needed, concern regarding malignancy due to patient symptoms.  Orders:  -     CT Soft Tissue Neck With & Without Contrast; Future    2. Skin lesions  Comments:  Unchanged, patient would like referral to dermatology, have previously entered this but will reorder it.  Orders:  -     Ambulatory Referral to Dermatology    3. MGUS (monoclonal gammopathy of unknown significance)  Comments:  Stable, patient to continue following up with specialists as directed by them.

## 2020-08-25 ENCOUNTER — TELEPHONE (OUTPATIENT)
Dept: FAMILY MEDICINE CLINIC | Facility: CLINIC | Age: 35
End: 2020-08-25

## 2020-08-25 LAB
CYTO UR: NORMAL
LAB AP CASE REPORT: NORMAL
LAB AP DIAGNOSIS COMMENT: NORMAL
LAB AP OUTSIDE REPORT, ADDENDUM: NORMAL
PATH REPORT.FINAL DX SPEC: NORMAL
PATH REPORT.GROSS SPEC: NORMAL

## 2020-08-25 NOTE — TELEPHONE ENCOUNTER
Left pt voicemail that [facility] paperwork was faxed today 8/25/2020 and told him we would email him a copy for his records. Ariana has emailed [facility] paperwork to patient. I asked him to call with any questions.

## 2020-09-02 ENCOUNTER — LAB (OUTPATIENT)
Dept: LAB | Facility: HOSPITAL | Age: 35
End: 2020-09-02

## 2020-09-02 DIAGNOSIS — D47.2 MONOCLONAL GAMMOPATHY: ICD-10-CM

## 2020-09-02 LAB
ALBUMIN SERPL-MCNC: 4.6 G/DL (ref 3.5–5.2)
ALBUMIN/GLOB SERPL: 1.3 G/DL
ALP SERPL-CCNC: 65 U/L (ref 39–117)
ALT SERPL W P-5'-P-CCNC: 25 U/L (ref 1–41)
ANION GAP SERPL CALCULATED.3IONS-SCNC: 12 MMOL/L (ref 5–15)
AST SERPL-CCNC: 23 U/L (ref 1–40)
BASOPHILS # BLD AUTO: 0.04 10*3/MM3 (ref 0–0.2)
BASOPHILS NFR BLD AUTO: 0.6 % (ref 0–1.5)
BILIRUB SERPL-MCNC: 0.5 MG/DL (ref 0–1.2)
BUN SERPL-MCNC: 11 MG/DL (ref 6–20)
BUN SERPL-MCNC: ABNORMAL MG/DL
BUN/CREAT SERPL: ABNORMAL
CALCIUM SPEC-SCNC: 9 MG/DL (ref 8.6–10.5)
CHLORIDE SERPL-SCNC: 101 MMOL/L (ref 98–107)
CO2 SERPL-SCNC: 24 MMOL/L (ref 22–29)
CREAT SERPL-MCNC: 0.94 MG/DL (ref 0.76–1.27)
DEPRECATED RDW RBC AUTO: 42.4 FL (ref 37–54)
EOSINOPHIL # BLD AUTO: 0.05 10*3/MM3 (ref 0–0.4)
EOSINOPHIL NFR BLD AUTO: 0.7 % (ref 0.3–6.2)
ERYTHROCYTE [DISTWIDTH] IN BLOOD BY AUTOMATED COUNT: 12.9 % (ref 12.3–15.4)
GFR SERPL CREATININE-BSD FRML MDRD: 91 ML/MIN/1.73
GLOBULIN UR ELPH-MCNC: 3.5 GM/DL
GLUCOSE SERPL-MCNC: 104 MG/DL (ref 65–99)
HCT VFR BLD AUTO: 44.4 % (ref 37.5–51)
HGB BLD-MCNC: 16 G/DL (ref 13–17.7)
LYMPHOCYTES # BLD AUTO: 1.64 10*3/MM3 (ref 0.7–3.1)
LYMPHOCYTES NFR BLD AUTO: 23.5 % (ref 19.6–45.3)
MCH RBC QN AUTO: 33.2 PG (ref 26.6–33)
MCHC RBC AUTO-ENTMCNC: 36 G/DL (ref 31.5–35.7)
MCV RBC AUTO: 92.1 FL (ref 79–97)
MONOCYTES # BLD AUTO: 0.51 10*3/MM3 (ref 0.1–0.9)
MONOCYTES NFR BLD AUTO: 7.3 % (ref 5–12)
NEUTROPHILS NFR BLD AUTO: 4.74 10*3/MM3 (ref 1.7–7)
NEUTROPHILS NFR BLD AUTO: 67.9 % (ref 42.7–76)
PLATELET # BLD AUTO: 202 10*3/MM3 (ref 140–450)
PMV BLD AUTO: 10.6 FL (ref 6–12)
POTASSIUM SERPL-SCNC: 3.8 MMOL/L (ref 3.5–5.2)
PROT SERPL-MCNC: 8.1 G/DL (ref 6–8.5)
RBC # BLD AUTO: 4.82 10*6/MM3 (ref 4.14–5.8)
SODIUM SERPL-SCNC: 137 MMOL/L (ref 136–145)
WBC # BLD AUTO: 6.98 10*3/MM3 (ref 3.4–10.8)

## 2020-09-02 PROCEDURE — 85025 COMPLETE CBC W/AUTO DIFF WBC: CPT

## 2020-09-02 PROCEDURE — 36415 COLL VENOUS BLD VENIPUNCTURE: CPT

## 2020-09-02 PROCEDURE — 80053 COMPREHEN METABOLIC PANEL: CPT

## 2020-09-03 LAB
ALBUMIN SERPL-MCNC: 4.1 G/DL (ref 2.9–4.4)
ALBUMIN/GLOB SERPL: 1.1 {RATIO} (ref 0.7–1.7)
ALPHA1 GLOB FLD ELPH-MCNC: 0.3 G/DL (ref 0–0.4)
ALPHA2 GLOB SERPL ELPH-MCNC: 0.7 G/DL (ref 0.4–1)
B-GLOBULIN SERPL ELPH-MCNC: 0.9 G/DL (ref 0.7–1.3)
GAMMA GLOB SERPL ELPH-MCNC: 2 G/DL (ref 0.4–1.8)
GLOBULIN SER CALC-MCNC: 3.9 G/DL (ref 2.2–3.9)
Lab: ABNORMAL
M-SPIKE: 1.8 G/DL
PROT PATTERN SERPL ELPH-IMP: ABNORMAL
PROT SERPL-MCNC: 8 G/DL (ref 6–8.5)

## 2020-09-09 ENCOUNTER — APPOINTMENT (OUTPATIENT)
Dept: LAB | Facility: HOSPITAL | Age: 35
End: 2020-09-09

## 2020-09-09 ENCOUNTER — OFFICE VISIT (OUTPATIENT)
Dept: ONCOLOGY | Facility: CLINIC | Age: 35
End: 2020-09-09

## 2020-09-09 VITALS
BODY MASS INDEX: 27.46 KG/M2 | HEART RATE: 69 BPM | DIASTOLIC BLOOD PRESSURE: 83 MMHG | TEMPERATURE: 98 F | WEIGHT: 214 LBS | RESPIRATION RATE: 16 BRPM | HEIGHT: 74 IN | SYSTOLIC BLOOD PRESSURE: 133 MMHG

## 2020-09-09 DIAGNOSIS — D47.2 MONOCLONAL GAMMOPATHY: Primary | ICD-10-CM

## 2020-09-09 DIAGNOSIS — D75.1 POLYCYTHEMIA: ICD-10-CM

## 2020-09-09 DIAGNOSIS — K22.70 BARRETT'S ESOPHAGUS WITHOUT DYSPLASIA: ICD-10-CM

## 2020-09-09 LAB
LAB AP CASE REPORT: NORMAL
LAB AP DIAGNOSIS COMMENT: NORMAL
LAB AP OUTSIDE REPORT, ADDENDUM: NORMAL
PATH REPORT.FINAL DX SPEC: NORMAL
PATH REPORT.GROSS SPEC: NORMAL

## 2020-09-09 PROCEDURE — 99214 OFFICE O/P EST MOD 30 MIN: CPT | Performed by: INTERNAL MEDICINE

## 2020-09-11 ENCOUNTER — TELEPHONE (OUTPATIENT)
Dept: ONCOLOGY | Facility: CLINIC | Age: 35
End: 2020-09-11

## 2020-09-11 NOTE — TELEPHONE ENCOUNTER
Caller: Yokasta    Relationship: Dr. Buchanan's office     Best call back number: [phone]    What form or medical record are you requesting: most recent lab 9/02 and office note 09/09    How would you like to receive the form or medical records (pick-up, mail, fax): Fax  If fax, what is the fax number: [phone]    Timeframe paperwork needed: Whenever possible

## 2020-09-22 ENCOUNTER — OFFICE (AMBULATORY)
Dept: URBAN - METROPOLITAN AREA CLINIC 64 | Facility: CLINIC | Age: 35
End: 2020-09-22
Payer: COMMERCIAL

## 2020-09-22 VITALS
HEIGHT: 74 IN | SYSTOLIC BLOOD PRESSURE: 136 MMHG | DIASTOLIC BLOOD PRESSURE: 103 MMHG | HEART RATE: 91 BPM | WEIGHT: 214 LBS

## 2020-09-22 DIAGNOSIS — K59.1 FUNCTIONAL DIARRHEA: ICD-10-CM

## 2020-09-22 PROCEDURE — 99213 OFFICE O/P EST LOW 20 MIN: CPT | Performed by: INTERNAL MEDICINE

## 2020-09-22 RX ORDER — COLESTIPOL HYDROCHLORIDE 1 G/1
4 TABLET, FILM COATED ORAL
Qty: 120 | Refills: 11 | Status: COMPLETED
Start: 2020-09-22 | End: 2020-10-21

## 2020-09-24 ENCOUNTER — OFFICE (AMBULATORY)
Dept: URBAN - METROPOLITAN AREA LAB 2 | Facility: LAB | Age: 35
End: 2020-09-24
Payer: COMMERCIAL

## 2020-09-24 DIAGNOSIS — K59.1 FUNCTIONAL DIARRHEA: ICD-10-CM

## 2020-09-24 PROCEDURE — 87324 CLOSTRIDIUM AG IA: CPT | Mod: 59 | Performed by: INTERNAL MEDICINE

## 2020-09-24 PROCEDURE — 87449 NOS EACH ORGANISM AG IA: CPT | Performed by: INTERNAL MEDICINE

## 2020-09-24 PROCEDURE — 83630 LACTOFERRIN FECAL (QUAL): CPT | Performed by: INTERNAL MEDICINE

## 2020-10-14 ENCOUNTER — TELEPHONE (OUTPATIENT)
Dept: FAMILY MEDICINE CLINIC | Facility: CLINIC | Age: 35
End: 2020-10-14

## 2020-10-14 NOTE — TELEPHONE ENCOUNTER
Pt left vm asking that his last CBC results be faxed to Markie at [phone] for Dr. Ahumada. I have faxed lab result.

## 2020-10-21 ENCOUNTER — OFFICE (AMBULATORY)
Dept: URBAN - METROPOLITAN AREA CLINIC 64 | Facility: CLINIC | Age: 35
End: 2020-10-21
Payer: COMMERCIAL

## 2020-10-21 VITALS
HEART RATE: 85 BPM | HEIGHT: 74 IN | WEIGHT: 214 LBS | DIASTOLIC BLOOD PRESSURE: 94 MMHG | SYSTOLIC BLOOD PRESSURE: 124 MMHG

## 2020-10-21 DIAGNOSIS — K59.1 FUNCTIONAL DIARRHEA: ICD-10-CM

## 2020-10-21 PROCEDURE — 99213 OFFICE O/P EST LOW 20 MIN: CPT | Performed by: INTERNAL MEDICINE

## 2020-10-21 RX ORDER — COLESTIPOL HYDROCHLORIDE 1 G/1
TABLET, FILM COATED ORAL
Qty: 180 | Refills: 3 | Status: COMPLETED
End: 2022-06-27

## 2020-11-17 NOTE — PROGRESS NOTES
HEMATOLOGY ONCOLOGY FOLLOW UP        Patient name: Eleazar Jeong  : 1985  MRN: [id number]  Primary Care Physician: Tierra Payan PA  Referring Physician: Tierra Payan PA  Reason For Consult:     No chief complaint on file.  Monoclonal gammopathy  History of polycythemia    History of Present Illness:    1. IgG kappa monoclonal gammopathy:    2018 to 2019 patient has not followed up in our office.  Patient was referred back by MARTIN Lacey due to IgG kappa monoclonal gammopathy  · 2019: Serum protein electrophoresis shows M protein 1.8 g/dL  · 2019 IgA 116, IgG 2206 high, IgM 55, serum immunofixation shows IgG kappa monoclonal gammopathy.  · 2019 creatinine 0.84, BUN 12,  high, ALT 70, iron 130, ferritin 34.3, iron saturation 38%, TIBC 346  WBC 6.7, hemoglobin 14.9, platelets 184, MCV 93.7   · 2019: Skeletal survey: No evidence of multiple myeloma.  No lytic or blastic lesions.  · 2019  SPEP M protein 1.38 free kappa light chains 37.6 high, Urine protein electrophoresis M spike 111.7, M spike to 3.38.,  24-hour urine protein is 260, urine immunofixation shows free kappa paraprotein peak and also a small IgG kappa paraprotein peak.  · 2019 calcium 9.5, creatinine 0.8, ALT 65 high, AST 57 high, hepatitis panel negative,  · 2019 Skeletal survey: No evidence of bone lytic lesions.  · 2019:UPEP shows to M spike's 4.5% and 1.3%.  24-hour urine protein to 60 high,  · 2020: WBC 9.2, hemoglobin 17.5, MCV 96.1, platelets 201  · 2020 Bone marrow aspiration and biopsy: Atypical marrow plasmacytosis 5 to 10%, consistent with plasma cell neoplasm.  Normocellular to focally mildly hypocellular marrow for age, 40 to 50%, with trilineage hematopoiesis.  No increase in reticulin fibrosis.  Storage iron present.  Flow cytometry: Clonal plasma cell population detected 1% of analyzed cells, with aberrant expression of CD  117 and cytoplasmic kappa light chain restriction.,  Normal cytogenetics 46 XY  · 3/11/2020 IgG 2104, IgM 51, IgA 101, SPEP shows M protein 1.7  . Free kappa light chains 48.1, free lambda light chain 6.9, kappa lambda ratio 6.97 high, WBC 7.8, hemoglobin 14.9, platelets 206, MCV 93.6  · 3/11/2020:  high,  high, albumin 4.3, alk phos 56, bilirubin 0.6  · 3/18/2020 WBC 7.7, hemoglobin 16, MCV 94, platelets 202,  · 3/18/2020: IgG 2304 high, IgM 53, IgA 108, free kappa light chains 55.8 high, free lambda light chains 8.2, kappa lambda ratio 6.8 high  · 4/28/2020: Liver biopsy: Benign liver with no significant histopathology.  No evidence of malignancy.  Esophageal biopsy shows glandular mucosa with intestinal metaplasia consistent with Matson's esophagus.  Negative for dysplasia.   · 6/3/2020: CMP normal, WBC 6.9, hemoglobin 14.9, platelets 220, MCV 95.2  · 6/10/2020: WBC 8.39, hemoglobin 15.1, platelets 212, SPEP shows M protein 1.8 g/dL.,  Free kappa light chains 51.2 high, free lambda light chains 11.3, kappa lambda ratio 4.53 high, IgG 2242 high,  · 8/6/2020: WBC 8, hemoglobin 16.1, platelets 186, IgA 94, IgG 1940 high, IgM 54 low, free kappa light chains 3695 high, free lambda light chain 6.75, kappa lambda ratio 5.47 high,Calcium 9.3, AST ALT normal, albumin 4.2, TSH 0.6, total testosterone 482, SPEP shows M protein 1.55,  · 8/20/2020: CTs soft tissue neck with and without contrast: No acute process seen in the neck.  No abnormal soft tissue mass or fluid collection.  No cervical adenopathy.  · 8/21/2020: PET CT scan: No convincing hypermetabolic osseous malignancy.  Right prepatellar soft tissue thickening and fluid with diffuse mild metabolic activity suggestive of inflammatory process.    · 9/2/2020:SPEP shows M protein 1.8 g/dL, creatinine 0.94, LFTs normal, creatinine 0.94, LFTs normal, WBC 6.9, hemoglobin 16, platelets 202, MCV 92.1  ·         2.  History of polycythemia:  Patient presented  to PCP Dr. Ann’s office on 4/11/16 that showed hemoglobin of 18.3.  MCV was  95.0.  He is referred to us for polycythemia.  The patient has a history of chronic anxiety and   depression.   · 4/11/16 - WBC 9.2, hemoglobin 18.3, MCV 95.0, platelet count 182,000.  Differential normal.    Vitamin B12 554.  · 5/10/2016, the patient presents for initial consultation for polycythemia.  He reports symptoms of occasional headaches.  He does have history of migraines.  He also reports some  nonspecific vision problems.  He also reports weight loss of around 30 pounds during the past six months.  He denies any fevers or any lymph node enlargement.  He also reports some night sweats.  He does feel tired all the time.  He denies having any snoring, but he lives alone.  He does report some symptoms of excessive daytime sleepiness.  He smokes about 3-4 cigars per day.  He  denies having a CBC in the past.    ·  5/10/16 - Creatinine 0.87.  LFTs normal.  WBC 7.3, hemoglobin 16.7, MCV 94.5, platelet count  189,000.  Erythropoietin level 10.6.  JAK2 V617 mutations negative.  Exon 12 and exon 13 mutation  negative.   mutation negative.    ·  6/8/16 - Bone marrow biopsy:  Hemodiluted hypocellular aspirate.  However, flow cytometry is  adequate and it does not reveal any abnormal immunophenotype.  Cytogenetics shows normal  karyotype.  The clot section showed a single bone marrow particle.  There was no evidence of  ringed sideroblasts.    · 6/30/16 - WBC 8.7, hemoglobin 17.6, MCV 94, platelet count 166,000.  · 9/1/16 - WBC 9.4, hemoglobin 16.8, MCV 94.9, platelet count 204,000.  Patient underwent   phlebotomy.  ·  1/5/17 - Hemoglobin 16.5.  Patient underwent phlebotomy.  ·  1/9/17 - WBC 9.8, hemoglobin 15.3, platelet count 195,000.    · 5/12/17 - WBC 6.9, hemoglobin 15.9, platelet count 185,000, MCV 91.3.    · 9/15/17 - WBC 9.4, hemoglobin 16.2, platelet count 201,000, MCV 91.1.   · 12/6/17 - EGD:  Probable Matson’s  esophagus. Antral gastritis.  Glandular mucosa with focal intestinal metaplasia, consistent with Matson’s.    ·  3/21/18 - Brain MRI with and without contrast:  A 2.3 cm structure along the superior margin of the cerebellum at the midline, consistent arachnoid cyst.       Subjective  Patient  presents for a follow up of IgG kappa monoclonal gammopathy. He continues to smoke, and has not tried to quit. He reports fatigue.  In the interim he was seen at [facility] hematology oncology by Dr. Barajas.  He has had problems with the left ear.  He felt a lump in the throat and a CT scan neck was ordered and it came back negative.  He follows up with Dr. Dumas at [facility] in [place]. He underwent a liver biopsy performed on 04/28/2020.  Patient does not report any frequent infections.  No new complaints.    The following portions of the patient's history were reviewed and updated as appropriate: allergies, current medications, past family history, past medical history, past social history, past surgical history and problem list.       Past Medical History:   Diagnosis Date   • Anxiety    • Benign monoclonal gammopathy     dr. quiroz   • Colon polyp    • Depression    • Elevated liver enzymes 04/2020   • Erectile dysfunction    • GERD (gastroesophageal reflux disease)    • Headache    • Hypoglycemia    • Left sided abdominal pain    • Low back pain    • Migraine    • Nausea    • Pneumonia 03/2019   • Tremor    • Visual impairment     wears glasses       Past Surgical History:   Procedure Laterality Date   • COLONOSCOPY     • UPPER ENDOSCOPIC ULTRASOUND W/ FNA N/A 4/28/2020    Procedure: Esophagogastroduodenoscopy and endoscopic ultrasound with biopsy of liver, biopsy of esophagus;  Surgeon: Danilo Balderrama MD;  Location: [facility] ENDOSCOPY;  Service: Gastroenterology;  Laterality: N/A;  gastritis, hiatel hernia, barretts esophagus       Current Outpatient Medications:   •  colestipol (COLESTID) 1 g tablet, Take 1 g by  mouth Daily. Sometimes takes 2 per day as instructed, Disp: , Rfl:   •  diphenhydrAMINE (BENADRYL ALLERGY) 25 MG tablet, Take 25 mg by mouth Every 6 (Six) Hours As Needed., Disp: , Rfl:   •  ibuprofen (ADVIL,MOTRIN) 800 MG tablet, Take 800 mg by mouth Every 8 (Eight) Hours. Stop now for procedure, Disp: , Rfl:   •  lamoTRIgine (LaMICtal) 25 MG tablet, Take 25 mg by mouth Daily., Disp: , Rfl:   •  Loperamide HCl (IMODIUM PO), 1 dose Daily As Needed., Disp: , Rfl:   •  meclizine (ANTIVERT) 12.5 MG tablet, 1-2 tabs PO TID PRN dizziness, Disp: 60 tablet, Rfl: 1  •  omeprazole (priLOSEC) 40 MG capsule, Take 40 mg by mouth Daily., Disp: , Rfl:   •  sildenafil (REVATIO) 20 MG tablet, Take 20 mg by mouth Daily As Needed. Do not use 24 hours prior to procedure, Disp: , Rfl: 99    Allergies   Allergen Reactions   • Amoxicillin Unknown (See Comments)   • Erythromycin Hives   • Levofloxacin Hives   • Penicillin G Unknown (See Comments)   • Penicillins Other (See Comments)       Family History   Problem Relation Age of Onset   • Diabetes Mother    • Vision loss Mother    • COPD Paternal Aunt    • Vision loss Paternal Aunt    • Diabetes Maternal Grandmother    • Vision loss Maternal Grandmother    • Cancer Maternal Grandmother    • Heart disease Paternal Grandmother    • Stroke Paternal Grandmother    • Heart disease Paternal Grandfather    • Vision loss Paternal Grandfather    • Vision loss Father    • Vision loss Sister        Cancer-related family history includes Cancer in his maternal grandmother.    Social History     Tobacco Use   • Smoking status: Current Every Day Smoker     Packs/day: 1.00     Years: 17.00     Pack years: 17.00     Types: Cigarettes, Cigars, Electronic Cigarette     Start date: 8/2/2002   • Smokeless tobacco: Never Used   Substance Use Topics   • Alcohol use: Not Currently   • Drug use: Not Currently     I have reviewed the history of present illness, past medical history, family history, social  "history, lab results, all notes and other records since the patient was last seen on  11/27/2019.    ROS:   Review of Systems   Constitutional: Positive for fatigue. Negative for activity change, chills and fever.   HENT: Positive for ear pain. Negative for congestion, mouth sores, nosebleeds and sore throat.    Eyes: Negative for photophobia, redness and visual disturbance.   Respiratory: Negative for wheezing and stridor.    Cardiovascular: Negative for chest pain and palpitations.   Gastrointestinal: Positive for constipation (states it is chronic and he is, \"used to it\"). Negative for abdominal pain, diarrhea, nausea and vomiting.   Endocrine: Negative for cold intolerance and heat intolerance.   Genitourinary: Negative for dysuria and hematuria.   Musculoskeletal: Negative for joint swelling and neck stiffness.   Skin: Negative for color change and rash.   Neurological: Negative for dizziness (vertigo lately), seizures and syncope.   Hematological: Negative for adenopathy.        No obvious bleeding   Psychiatric/Behavioral: Negative for agitation, confusion and hallucinations.     Objective:  Vital signs:  There were no vitals filed for this visit.  ECOG  (0) Fully active, able to carry on all predisease performance without restriction    Physical Exam:   Physical Exam   Constitutional: He is oriented to person, place, and time. He appears well-developed. No distress.   HENT:   Head: Normocephalic and atraumatic.   Eyes: Conjunctivae are normal. Right eye exhibits no discharge. Left eye exhibits no discharge. No scleral icterus.   Wears glasses   Neck: Normal range of motion. Neck supple. No thyromegaly present.   Cardiovascular: Normal rate, regular rhythm and normal heart sounds. Exam reveals no gallop and no friction rub.   Pulmonary/Chest: Effort normal. No stridor. No respiratory distress. He has no wheezes.   Slightly decreased air entry bilaterally   Abdominal: Soft. Bowel sounds are normal. He exhibits " no mass. There is no abdominal tenderness. There is no rebound and no guarding.   Musculoskeletal: Normal range of motion. No tenderness.   Lymphadenopathy:     He has no cervical adenopathy.   Neurological: He is alert and oriented to person, place, and time. He exhibits normal muscle tone.   Skin: Skin is warm. No rash noted. He is not diaphoretic. No erythema.   Multiple tattoos   Psychiatric: His behavior is normal.   Nursing note and vitals reviewed.  I have reexamined the patient and the results are consistent with the previously documented exam. Bertha Fisher MA       Lab Results - Last 18 Months   Lab Units 09/02/20  0806 06/10/20  0908 06/03/20  0913   WBC 10*3/mm3 6.98 8.39 6.96   HEMOGLOBIN g/dL 16.0 15.1 14.9   HEMATOCRIT % 44.4 42.1 41.8   PLATELETS 10*3/mm3 202 212 220   MCV fL 92.1 94.8 95.2     Lab Results - Last 18 Months   Lab Units 09/02/20  0806 06/03/20  0913 03/18/20  1039   SODIUM mmol/L 137 136 138   POTASSIUM mmol/L 3.8 3.6 4.4   CHLORIDE mmol/L 101 103 101   CO2 mmol/L 24.0 23.0 26.0   BUN  11 14 12   CREATININE mg/dL 0.94 1.03 0.97   CALCIUM mg/dL 9.0 9.2 9.5   BILIRUBIN mg/dL 0.5 0.6 0.6   ALK PHOS U/L 65 61 60   ALT (SGPT) U/L 25 33 96*   AST (SGOT) U/L 23 24 89*   GLUCOSE mg/dL 104* 119* 95       Lab Results   Component Value Date    GLUCOSE 104 (H) 09/02/2020    BUN  09/02/2020      Comment:      Testing performed by alternate method    BUN 11 09/02/2020    CREATININE 0.94 09/02/2020    EGFRIFNONA 91 09/02/2020    BCR  09/02/2020      Comment:      Testing not performed    K 3.8 09/02/2020    CO2 24.0 09/02/2020    CALCIUM 9.0 09/02/2020    PROTENTOTREF 8.0 09/02/2020    ALBUMIN 4.1 09/02/2020    ALBUMIN 4.60 09/02/2020    LABIL2 1.1 09/02/2020    AST 23 09/02/2020    ALT 25 09/02/2020       Lab Results - Last 18 Months   Lab Units 01/17/20  0808   INR  1.04   APTT seconds 24.9       Lab Results   Component Value Date    IRON 130 12/13/2019    TIBC 346 12/13/2019    FERRITIN 34.35  12/13/2019       No results found for: FOLATE    No results found for: OCCULTBLD    No results found for: RETICCTPCT    Lab Results   Component Value Date    GQDSECAT58 319 06/13/2019     Lab Results   Component Value Date    SPEP Comment 09/02/2020     No results found for: LDH, URICACID  Lab Results   Component Value Date    SEDRATE 3 11/05/2019     No results found for: FIBRINOGEN, HAPTOGLOBIN  Lab Results   Component Value Date    PTT 24.9 01/17/2020    INR 1.04 01/17/2020     No results found for:   No results found for: CEA  No components found for: CA-19-9  No results found for: PSA      Assessment/Plan     Assessment:  1.  IgG kappa MGUS: Diagnosed November 2019.  High-intermediate risk.  PET CT scan on 8/21/2020 does not show any evidence of bone lesions..  No crab symptoms..  M protein was 1.8 initially, but more recently it was around 1.7.  Skeletal survey was negative.  He does not report any frequent infections..  Bone marrow biopsy shows about 5 to 10% plasma cells.  Cytogenetics showed normal karyotype..  Urine immunofixation is also positive.  M protein remains stable as of September 2020.  Unable to run myeloma FISH panel on the bone marrow.  2. History of elevated liver enzymes: ALT is up to 193 and AST up to 602 as of 3/11/2020.  Patient is following with gastroenterology.  .  Status post liver biopsy which did not reveal any pathology.  Repeat LFTs normal in September 2020.  3. History of secondary polycythemia: Seems resolved. Bone marrow biopsy was unremarkable.  JAK2 mutation and EPO  level do not suggest any evidence of polycythemia vera.  He has secondary polycythemia likely due  to tobacco use and from sleep apnea.  The patient continues to smoke.  Patient did not want to  have a sleep study.  He has not required phlebotomy for the last 3 years His hemoglobin has stabilized.  Unfortunately he continues to smoke.   4. History of Matson’s esophagus.  He follows up with Dr. Hernandez.   Will need surveillance endoscopies.  5. History of tobacco use: Smoking cessation counseling was provided.  Patient not interested in quitting at this time.       PLAN:    1.  SPEP, free light chain assay, CBC CMP in 3 months.  Continue surveillance.  No indication for treatment.  2. Patient not interested in smoking cessation.  3. Continue follow-up with gastroenterology for increased liver enzymes and Matson's esophagus  4. Will need repeat endoscopies for his Matson's esophagus.  5. We will follow patient back in 3 months with repeat myeloma labs..      I counselled Eleazar of the risks of continuing to use tobacco and cessation.    During this visit, I spent 3-10 minutes counseling the patient regarding tobacco cessation.       There are no diagnoses linked to this encounter.  No orders of the defined types were placed in this encounter.     Thank you very much for providing the opportunity to participate in this patient’s care. Please do not hesitate to call if there are any other questions.    I have reviewed and confirmed the accuracy of the patient's history: Chief complaint, HPI, ROS and Subjective as entered by the MA/TAMIR/RN. Bertha Fisher MA 11/17/20        Electronically signed by Shabbir Preciado MD, 09/09/20, 9:44 AM.

## 2020-11-24 ENCOUNTER — APPOINTMENT (OUTPATIENT)
Dept: ONCOLOGY | Facility: CLINIC | Age: 35
End: 2020-11-24

## 2020-11-25 ENCOUNTER — APPOINTMENT (OUTPATIENT)
Dept: LAB | Facility: HOSPITAL | Age: 35
End: 2020-11-25

## 2020-12-07 NOTE — PROGRESS NOTES
HEMATOLOGY ONCOLOGY FOLLOW UP        Patient name: Eleazar Jeong  : 1985  MRN: [id number]  Primary Care Physician: Tierra Payan PA  Referring Physician: Tierra Payan PA  Reason For Consult:     Chief Complaint   Patient presents with   • Appointment     Monoclonal gammaopathy   • Follow-up   Monoclonal gammopathy  History of polycythemia    History of Present Illness:    1. IgG kappa monoclonal gammopathy:    2018 to 2019 patient has not followed up in our office.  Patient was referred back by MARTIN Lacey due to IgG kappa monoclonal gammopathy  · 2019: Serum protein electrophoresis shows M protein 1.8 g/dL  · 2019 IgA 116, IgG 2206 high, IgM 55, serum immunofixation shows IgG kappa monoclonal gammopathy.  · 2019 creatinine 0.84, BUN 12,  high, ALT 70, iron 130, ferritin 34.3, iron saturation 38%, TIBC 346  WBC 6.7, hemoglobin 14.9, platelets 184, MCV 93.7   · 2019: Skeletal survey: No evidence of multiple myeloma.  No lytic or blastic lesions.  · 2019  SPEP M protein 1.38 free kappa light chains 37.6 high, Urine protein electrophoresis M spike 111.7, M spike to 3.38.,  24-hour urine protein is 260, urine immunofixation shows free kappa paraprotein peak and also a small IgG kappa paraprotein peak.  · 2019 calcium 9.5, creatinine 0.8, ALT 65 high, AST 57 high, hepatitis panel negative,  · 2019 Skeletal survey: No evidence of bone lytic lesions.  · 2019:UPEP shows to M spike's 4.5% and 1.3%.  24-hour urine protein to 60 high,  · 2020: WBC 9.2, hemoglobin 17.5, MCV 96.1, platelets 201  · 2020 Bone marrow aspiration and biopsy: Atypical marrow plasmacytosis 5 to 10%, consistent with plasma cell neoplasm.  Normocellular to focally mildly hypocellular marrow for age, 40 to 50%, with trilineage hematopoiesis.  No increase in reticulin fibrosis.  Storage iron present.  Flow cytometry: Clonal plasma cell  population detected 1% of analyzed cells, with aberrant expression of  and cytoplasmic kappa light chain restriction.,  Normal cytogenetics 46 XY  · 3/11/2020 IgG 2104, IgM 51, IgA 101, SPEP shows M protein 1.7  . Free kappa light chains 48.1, free lambda light chain 6.9, kappa lambda ratio 6.97 high, WBC 7.8, hemoglobin 14.9, platelets 206, MCV 93.6  · 3/11/2020:  high,  high, albumin 4.3, alk phos 56, bilirubin 0.6  · 3/18/2020 WBC 7.7, hemoglobin 16, MCV 94, platelets 202,  · 3/18/2020: IgG 2304 high, IgM 53, IgA 108, free kappa light chains 55.8 high, free lambda light chains 8.2, kappa lambda ratio 6.8 high  · 4/28/2020: Liver biopsy: Benign liver with no significant histopathology.  No evidence of malignancy.  Esophageal biopsy shows glandular mucosa with intestinal metaplasia consistent with Matson's esophagus.  Negative for dysplasia.   · 6/3/2020: CMP normal, WBC 6.9, hemoglobin 14.9, platelets 220, MCV 95.2  · 6/10/2020: WBC 8.39, hemoglobin 15.1, platelets 212, SPEP shows M protein 1.8 g/dL.,  Free kappa light chains 51.2 high, free lambda light chains 11.3, kappa lambda ratio 4.53 high, IgG 2242 high,  · 8/6/2020: WBC 8, hemoglobin 16.1, platelets 186, IgA 94, IgG 1940 high, IgM 54 low, free kappa light chains 3695 high, free lambda light chain 6.75, kappa lambda ratio 5.47 high,Calcium 9.3, AST ALT normal, albumin 4.2, TSH 0.6, total testosterone 482, SPEP shows M protein 1.55,  · 8/20/2020: CTs soft tissue neck with and without contrast: No acute process seen in the neck.  No abnormal soft tissue mass or fluid collection.  No cervical adenopathy.  · 8/21/2020: PET CT scan: No convincing hypermetabolic osseous malignancy.  Right prepatellar soft tissue thickening and fluid with diffuse mild metabolic activity suggestive of inflammatory process.    · 9/2/2020:SPEP shows M protein 1.8 g/dL, creatinine 0.94, LFTs normal, creatinine 0.94, LFTs normal, WBC 6.9, hemoglobin 16, platelets  202, MCV 92.1  ·         2.  History of polycythemia:  Patient presented to PCP Dr. Ann’s office on 4/11/16 that showed hemoglobin of 18.3.  MCV was  95.0.  He is referred to us for polycythemia.  The patient has a history of chronic anxiety and   depression.   · 4/11/16 - WBC 9.2, hemoglobin 18.3, MCV 95.0, platelet count 182,000.  Differential normal.    Vitamin B12 554.  · 5/10/2016, the patient presents for initial consultation for polycythemia.  He reports symptoms of occasional headaches.  He does have history of migraines.  He also reports some  nonspecific vision problems.  He also reports weight loss of around 30 pounds during the past six months.  He denies any fevers or any lymph node enlargement.  He also reports some night sweats.  He does feel tired all the time.  He denies having any snoring, but he lives alone.  He does report some symptoms of excessive daytime sleepiness.  He smokes about 3-4 cigars per day.  He  denies having a CBC in the past.    ·  5/10/16 - Creatinine 0.87.  LFTs normal.  WBC 7.3, hemoglobin 16.7, MCV 94.5, platelet count  189,000.  Erythropoietin level 10.6.  JAK2 V617 mutations negative.  Exon 12 and exon 13 mutation  negative.   mutation negative.    ·  6/8/16 - Bone marrow biopsy:  Hemodiluted hypocellular aspirate.  However, flow cytometry is  adequate and it does not reveal any abnormal immunophenotype.  Cytogenetics shows normal  karyotype.  The clot section showed a single bone marrow particle.  There was no evidence of  ringed sideroblasts.    · 6/30/16 - WBC 8.7, hemoglobin 17.6, MCV 94, platelet count 166,000.  · 9/1/16 - WBC 9.4, hemoglobin 16.8, MCV 94.9, platelet count 204,000.  Patient underwent   phlebotomy.  ·  1/5/17 - Hemoglobin 16.5.  Patient underwent phlebotomy.  ·  1/9/17 - WBC 9.8, hemoglobin 15.3, platelet count 195,000.    · 5/12/17 - WBC 6.9, hemoglobin 15.9, platelet count 185,000, MCV 91.3.    · 9/15/17 - WBC 9.4, hemoglobin 16.2, platelet  count 201,000, MCV 91.1.   · 12/6/17 - EGD:  Probable Matson’s esophagus. Antral gastritis.  Glandular mucosa with focal intestinal metaplasia, consistent with Matson’s.    ·  3/21/18 - Brain MRI with and without contrast:  A 2.3 cm structure along the superior margin of the cerebellum at the midline, consistent arachnoid cyst.       Subjective  Patient  presents for a follow up of IgG kappa monoclonal gammopathy.  He is now trying to quit smoking.  He wants to try nicotine vapor.  Denies any frequent infections.  He is planning to fly to [place]..  He had a right knee surgery and had a cyst excised.  He follows up with Dr. Dumas at [facility] in [place].   Patient does not report any frequent infections.  No new complaints..  He has not had any blood work before this visit    The following portions of the patient's history were reviewed and updated as appropriate: allergies, current medications, past family history, past medical history, past social history, past surgical history and problem list.       Past Medical History:   Diagnosis Date   • Anxiety    • Benign monoclonal gammopathy     dr. quiroz   • Colon polyp    • Depression    • Elevated liver enzymes 04/2020   • Erectile dysfunction    • GERD (gastroesophageal reflux disease)    • Headache    • Hypoglycemia    • Left sided abdominal pain    • Low back pain    • Migraine    • Nausea    • Pneumonia 03/2019   • Tremor    • Visual impairment     wears glasses       Past Surgical History:   Procedure Laterality Date   • COLONOSCOPY     • UPPER ENDOSCOPIC ULTRASOUND W/ FNA N/A 4/28/2020    Procedure: Esophagogastroduodenoscopy and endoscopic ultrasound with biopsy of liver, biopsy of esophagus;  Surgeon: Danilo Balderrama MD;  Location: [facility] ENDOSCOPY;  Service: Gastroenterology;  Laterality: N/A;  gastritis, hiatel hernia, barretts esophagus       Current Outpatient Medications:   •  colestipol (COLESTID) 1 g tablet, Take 1 g by mouth Daily. Sometimes takes 2  per day as instructed, Disp: , Rfl:   •  diphenhydrAMINE (BENADRYL ALLERGY) 25 MG tablet, Take 25 mg by mouth Every 6 (Six) Hours As Needed., Disp: , Rfl:   •  ibuprofen (ADVIL,MOTRIN) 800 MG tablet, Take 800 mg by mouth Every 8 (Eight) Hours. Stop now for procedure, Disp: , Rfl:   •  lamoTRIgine (LaMICtal) 25 MG tablet, Take 25 mg by mouth Daily., Disp: , Rfl:   •  Loperamide HCl (IMODIUM PO), 1 dose Daily As Needed., Disp: , Rfl:   •  meclizine (ANTIVERT) 12.5 MG tablet, 1-2 tabs PO TID PRN dizziness, Disp: 60 tablet, Rfl: 1  •  omeprazole (priLOSEC) 40 MG capsule, Take 40 mg by mouth Daily., Disp: , Rfl:   •  sildenafil (REVATIO) 20 MG tablet, Take 20 mg by mouth Daily As Needed. Do not use 24 hours prior to procedure, Disp: , Rfl: 99    Allergies   Allergen Reactions   • Amoxicillin Unknown (See Comments)   • Erythromycin Hives   • Levofloxacin Hives   • Penicillin G Unknown (See Comments)   • Penicillins Other (See Comments)       Family History   Problem Relation Age of Onset   • Diabetes Mother    • Vision loss Mother    • COPD Paternal Aunt    • Vision loss Paternal Aunt    • Diabetes Maternal Grandmother    • Vision loss Maternal Grandmother    • Cancer Maternal Grandmother    • Heart disease Paternal Grandmother    • Stroke Paternal Grandmother    • Heart disease Paternal Grandfather    • Vision loss Paternal Grandfather    • Vision loss Father    • Vision loss Sister        Cancer-related family history includes Cancer in his maternal grandmother.    Social History     Tobacco Use   • Smoking status: Current Every Day Smoker     Packs/day: 1.00     Years: 17.00     Pack years: 17.00     Types: Cigarettes, Cigars, Electronic Cigarette     Start date: 8/2/2002   • Smokeless tobacco: Never Used   Substance Use Topics   • Alcohol use: Not Currently   • Drug use: Not Currently     I have reviewed the history of present illness, past medical history, family history, social history, lab results, all notes and  "other records since the patient was last seen on  11/27/2019.    ROS:   Review of Systems   Constitutional: Positive for fatigue. Negative for activity change, chills and fever.   HENT: Positive for ear pain. Negative for congestion, mouth sores, nosebleeds and sore throat.    Eyes: Negative for photophobia, redness and visual disturbance.   Respiratory: Negative for wheezing and stridor.    Cardiovascular: Negative for chest pain and palpitations.   Gastrointestinal: Positive for constipation (states it is chronic and he is, \"used to it\"). Negative for abdominal pain, diarrhea, nausea and vomiting.   Endocrine: Negative for cold intolerance and heat intolerance.   Genitourinary: Negative for dysuria and hematuria.   Musculoskeletal: Negative for joint swelling and neck stiffness.   Skin: Negative for color change and rash.   Neurological: Negative for dizziness (vertigo lately), seizures and syncope.   Hematological: Negative for adenopathy.        No obvious bleeding   Psychiatric/Behavioral: Negative for agitation, confusion and hallucinations.     Objective:  Vital signs:  Vitals:    12/08/20 1154   BP: 120/62   Pulse: 60   Resp: 20   Temp: 96.9 °F (36.1 °C)   TempSrc: Temporal   Weight: 99.8 kg (220 lb)   Height: 188 cm (74.02\")   PainSc: 0-No pain     ECOG  (0) Fully active, able to carry on all predisease performance without restriction    Physical Exam:   Physical Exam   Constitutional: He is oriented to person, place, and time. He appears well-developed. No distress.   HENT:   Head: Normocephalic and atraumatic.   Eyes: Conjunctivae are normal. Right eye exhibits no discharge. Left eye exhibits no discharge. No scleral icterus.   Wears glasses   Neck: Normal range of motion. Neck supple. No thyromegaly present.   Cardiovascular: Normal rate, regular rhythm, normal heart sounds and normal pulses. Exam reveals no gallop and no friction rub.   Pulmonary/Chest: Effort normal. No stridor. No respiratory " distress. He has no wheezes.   Slightly decreased air entry bilaterally   Abdominal: Soft. Normal appearance and bowel sounds are normal. He exhibits no mass. There is no abdominal tenderness. There is no rebound and no guarding.   Musculoskeletal: Normal range of motion. No tenderness.      Comments: Healing surgical scar noted on the right knee   Lymphadenopathy:     He has no cervical adenopathy.   Neurological: He is alert and oriented to person, place, and time. He exhibits normal muscle tone.   Skin: Skin is warm. No rash noted. He is not diaphoretic. No erythema.   Multiple tattoos   Psychiatric: His behavior is normal. Mood and thought content normal.   Nursing note and vitals reviewed.  I have reexamined the patient and the results are consistent with the previously documented exam. Shabbir Preciado MD       Lab Results - Last 18 Months   Lab Units 09/02/20  0806 06/10/20  0908 06/03/20  0913   WBC 10*3/mm3 6.98 8.39 6.96   HEMOGLOBIN g/dL 16.0 15.1 14.9   HEMATOCRIT % 44.4 42.1 41.8   PLATELETS 10*3/mm3 202 212 220   MCV fL 92.1 94.8 95.2     Lab Results - Last 18 Months   Lab Units 09/02/20  0806 06/03/20  0913 03/18/20  1039   SODIUM mmol/L 137 136 138   POTASSIUM mmol/L 3.8 3.6 4.4   CHLORIDE mmol/L 101 103 101   CO2 mmol/L 24.0 23.0 26.0   BUN  11 14 12   CREATININE mg/dL 0.94 1.03 0.97   CALCIUM mg/dL 9.0 9.2 9.5   BILIRUBIN mg/dL 0.5 0.6 0.6   ALK PHOS U/L 65 61 60   ALT (SGPT) U/L 25 33 96*   AST (SGOT) U/L 23 24 89*   GLUCOSE mg/dL 104* 119* 95       Lab Results   Component Value Date    GLUCOSE 104 (H) 09/02/2020    BUN  09/02/2020      Comment:      Testing performed by alternate method    BUN 11 09/02/2020    CREATININE 0.94 09/02/2020    EGFRIFNONA 91 09/02/2020    BCR  09/02/2020      Comment:      Testing not performed    K 3.8 09/02/2020    CO2 24.0 09/02/2020    CALCIUM 9.0 09/02/2020    PROTENTOTREF 8.0 09/02/2020    ALBUMIN 4.1 09/02/2020    ALBUMIN 4.60 09/02/2020    LABIL2 1.1 09/02/2020     AST 23 09/02/2020    ALT 25 09/02/2020       Lab Results - Last 18 Months   Lab Units 01/17/20  0808   INR  1.04   APTT seconds 24.9       Lab Results   Component Value Date    IRON 130 12/13/2019    TIBC 346 12/13/2019    FERRITIN 34.35 12/13/2019       No results found for: FOLATE    No results found for: OCCULTBLD    No results found for: RETICCTPCT    Lab Results   Component Value Date    [id number] 319 06/13/2019     Lab Results   Component Value Date    SPEP Comment 09/02/2020     No results found for: LDH, URICACID  Lab Results   Component Value Date    SEDRATE 3 11/05/2019     No results found for: FIBRINOGEN, HAPTOGLOBIN  Lab Results   Component Value Date    PTT 24.9 01/17/2020    INR 1.04 01/17/2020     No results found for:   No results found for: CEA  No components found for: CA-19-9  No results found for: PSA      Assessment/Plan     Assessment:  1.  IgG kappa MGUS: Diagnosed November 2019.  High-intermediate risk.  PET CT scan on 8/21/2020 does not show any evidence of bone lesions..  No crab symptoms..  M protein was 1.8 initially, but more recently it was around 1.7.  Skeletal survey was negative.  He does not report any frequent infections..  Bone marrow biopsy shows about 5 to 10% plasma cells.  Cytogenetics showed normal karyotype..  Urine immunofixation is also positive.  M protein remains stable as of September 2020.  Unable to run myeloma FISH panel on the bone marrow.  2. History of elevated liver enzymes: ALT is up to 193 and AST up to 602 as of 3/11/2020.  Patient is following with gastroenterology.  .  Status post liver biopsy which did not reveal any pathology.  Repeat LFTs normal in September 2020.  3. History of secondary polycythemia: Seems resolved. Bone marrow biopsy was unremarkable.  JAK2 mutation and EPO  level do not suggest any evidence of polycythemia vera.  He has secondary polycythemia likely due  to tobacco use and from sleep apnea.  The patient continues to smoke.   Patient did not want to  have a sleep study.  He has not required phlebotomy for the last 3 years His hemoglobin has stabilized.  Unfortunately he continues to smoke.   4. History of Matson’s esophagus.  He follows up with Dr. Hernandez.  Will need surveillance endoscopies.  5. History of tobacco use: Smoking cessation counseling was provided.  Patient wants to try nicotine vapor.       PLAN:    1.  SPEP, free light chain assay, CBC CMP in 3 months.  Continue surveillance.  No indication for treatment..  2. Continue follow-up with gastroenterology for increased liver enzymes and Matson's esophagus  3. Will need repeat endoscopies for his Matson's esophagus.  4. We will follow patient back in 3 months with repeat myeloma labs..      I counselled Eleazar of the risks of continuing to use tobacco and cessation.    During this visit, I spent 3-10 minutes counseling the patient regarding tobacco cessation.       Monoclonal gammopathy  - [facility] Requisition ([facility] Only)    Orders Placed This Encounter   Procedures   • [facility] Requisition ([facility] Only)     CBC CMP Immunoglobulin free light chains SPEP     Standing Status:   Future     Standing Expiration Date:   12/8/2021          I have reviewed and confirmed the accuracy of the patient's history: Chief complaint, HPI, ROS and Subjective as entered by the MA/LPN/RN. Shabbir Preciado MD 12/08/20        Electronically signed by Shabbir Preciado MD, 12/08/20, 12:47 PM EST.

## 2020-12-08 ENCOUNTER — OFFICE VISIT (OUTPATIENT)
Dept: ONCOLOGY | Facility: CLINIC | Age: 35
End: 2020-12-08

## 2020-12-08 ENCOUNTER — APPOINTMENT (OUTPATIENT)
Dept: ONCOLOGY | Facility: CLINIC | Age: 35
End: 2020-12-08

## 2020-12-08 ENCOUNTER — RESULTS ENCOUNTER (OUTPATIENT)
Dept: ONCOLOGY | Facility: CLINIC | Age: 35
End: 2020-12-08

## 2020-12-08 VITALS
RESPIRATION RATE: 20 BRPM | HEART RATE: 60 BPM | HEIGHT: 74 IN | SYSTOLIC BLOOD PRESSURE: 120 MMHG | TEMPERATURE: 96.9 F | WEIGHT: 220 LBS | BODY MASS INDEX: 28.23 KG/M2 | DIASTOLIC BLOOD PRESSURE: 62 MMHG

## 2020-12-08 DIAGNOSIS — D47.2 MONOCLONAL GAMMOPATHY: ICD-10-CM

## 2020-12-08 DIAGNOSIS — D47.2 MONOCLONAL GAMMOPATHY: Primary | ICD-10-CM

## 2020-12-08 PROCEDURE — 99214 OFFICE O/P EST MOD 30 MIN: CPT | Performed by: INTERNAL MEDICINE

## 2020-12-09 ENCOUNTER — OFFICE VISIT (OUTPATIENT)
Dept: FAMILY MEDICINE CLINIC | Facility: CLINIC | Age: 35
End: 2020-12-09

## 2020-12-09 ENCOUNTER — LAB (OUTPATIENT)
Dept: LAB | Facility: HOSPITAL | Age: 35
End: 2020-12-09

## 2020-12-09 VITALS
SYSTOLIC BLOOD PRESSURE: 134 MMHG | HEART RATE: 86 BPM | HEIGHT: 74 IN | OXYGEN SATURATION: 98 % | BODY MASS INDEX: 28.62 KG/M2 | TEMPERATURE: 98.4 F | DIASTOLIC BLOOD PRESSURE: 88 MMHG | WEIGHT: 223 LBS

## 2020-12-09 DIAGNOSIS — Z00.00 HEALTH MAINTENANCE EXAMINATION: Primary | ICD-10-CM

## 2020-12-09 DIAGNOSIS — Z00.00 HEALTH MAINTENANCE EXAMINATION: ICD-10-CM

## 2020-12-09 DIAGNOSIS — E87.1 HYPONATREMIA: Primary | ICD-10-CM

## 2020-12-09 DIAGNOSIS — D47.2 MONOCLONAL GAMMOPATHY: ICD-10-CM

## 2020-12-09 LAB
ALBUMIN SERPL-MCNC: 4.8 G/DL (ref 3.5–5.2)
ALBUMIN/GLOB SERPL: 1.2 G/DL
ALP SERPL-CCNC: 70 U/L (ref 39–117)
ALT SERPL W P-5'-P-CCNC: 43 U/L (ref 1–41)
ANION GAP SERPL CALCULATED.3IONS-SCNC: 6.5 MMOL/L (ref 5–15)
AST SERPL-CCNC: 27 U/L (ref 1–40)
BILIRUB SERPL-MCNC: 0.7 MG/DL (ref 0–1.2)
BUN SERPL-MCNC: 10 MG/DL (ref 6–20)
BUN/CREAT SERPL: 11.1 (ref 7–25)
CALCIUM SPEC-SCNC: 9.6 MG/DL (ref 8.6–10.5)
CHLORIDE SERPL-SCNC: 99 MMOL/L (ref 98–107)
CHOLEST SERPL-MCNC: 171 MG/DL (ref 0–200)
CO2 SERPL-SCNC: 27.5 MMOL/L (ref 22–29)
CREAT SERPL-MCNC: 0.9 MG/DL (ref 0.76–1.27)
GFR SERPL CREATININE-BSD FRML MDRD: 96 ML/MIN/1.73
GLOBULIN UR ELPH-MCNC: 4 GM/DL
GLUCOSE SERPL-MCNC: 95 MG/DL (ref 65–99)
HDLC SERPL-MCNC: 33 MG/DL (ref 40–60)
LDLC SERPL CALC-MCNC: 114 MG/DL (ref 0–100)
LDLC/HDLC SERPL: 3.38 {RATIO}
POTASSIUM SERPL-SCNC: 4.4 MMOL/L (ref 3.5–5.2)
PROT SERPL-MCNC: 8.8 G/DL (ref 6–8.5)
SODIUM SERPL-SCNC: 133 MMOL/L (ref 136–145)
TRIGL SERPL-MCNC: 133 MG/DL (ref 0–150)
VLDLC SERPL-MCNC: 24 MG/DL (ref 5–40)

## 2020-12-09 PROCEDURE — 80053 COMPREHEN METABOLIC PANEL: CPT

## 2020-12-09 PROCEDURE — 36415 COLL VENOUS BLD VENIPUNCTURE: CPT

## 2020-12-09 PROCEDURE — 80061 LIPID PANEL: CPT

## 2020-12-09 PROCEDURE — 99395 PREV VISIT EST AGE 18-39: CPT | Performed by: NURSE PRACTITIONER

## 2020-12-09 RX ORDER — SILDENAFIL 100 MG/1
100 TABLET, FILM COATED ORAL
COMMUNITY

## 2020-12-09 RX ORDER — DIPHENOXYLATE HYDROCHLORIDE AND ATROPINE SULFATE 2.5; .025 MG/1; MG/1
TABLET ORAL
COMMUNITY
Start: 2020-11-23

## 2020-12-09 NOTE — PATIENT INSTRUCTIONS
Health Maintenance, Male  Adopting a healthy lifestyle and getting preventive care are important in promoting health and wellness. Ask your health care provider about:  · The right schedule for you to have regular tests and exams.  · Things you can do on your own to prevent diseases and keep yourself healthy.  What should I know about diet, weight, and exercise?  Eat a healthy diet    · Eat a diet that includes plenty of vegetables, fruits, low-fat dairy products, and lean protein.  · Do not eat a lot of foods that are high in solid fats, added sugars, or sodium.  Maintain a healthy weight  Body mass index (BMI) is a measurement that can be used to identify possible weight problems. It estimates body fat based on height and weight. Your health care provider can help determine your BMI and help you achieve or maintain a healthy weight.  Get regular exercise  Get regular exercise. This is one of the most important things you can do for your health. Most adults should:  · Exercise for at least 150 minutes each week. The exercise should increase your heart rate and make you sweat (moderate-intensity exercise).  · Do strengthening exercises at least twice a week. This is in addition to the moderate-intensity exercise.  · Spend less time sitting. Even light physical activity can be beneficial.  Watch cholesterol and blood lipids  Have your blood tested for lipids and cholesterol at 20 years of age, then have this test every 5 years.  You may need to have your cholesterol levels checked more often if:  · Your lipid or cholesterol levels are high.  · You are older than 40 years of age.  · You are at high risk for heart disease.  What should I know about cancer screening?  Many types of cancers can be detected early and may often be prevented. Depending on your health history and family history, you may need to have cancer screening at various ages. This may include screening for:  · Colorectal cancer.  · Prostate  cancer.  · Skin cancer.  · Lung cancer.  What should I know about heart disease, diabetes, and high blood pressure?  Blood pressure and heart disease  · High blood pressure causes heart disease and increases the risk of stroke. This is more likely to develop in people who have high blood pressure readings, are of  descent, or are overweight.  · Talk with your health care provider about your target blood pressure readings.  · Have your blood pressure checked:  ? Every 3-5 years if you are 18-39 years of age.  ? Every year if you are 40 years old or older.  · If you are between the ages of 65 and 75 and are a current or former smoker, ask your health care provider if you should have a one-time screening for abdominal aortic aneurysm (AAA).  Diabetes  Have regular diabetes screenings. This checks your fasting blood sugar level. Have the screening done:  · Once every three years after age 45 if you are at a normal weight and have a low risk for diabetes.  · More often and at a younger age if you are overweight or have a high risk for diabetes.  What should I know about preventing infection?  Hepatitis B  If you have a higher risk for hepatitis B, you should be screened for this virus. Talk with your health care provider to find out if you are at risk for hepatitis B infection.  Hepatitis C  Blood testing is recommended for:  · Everyone born from 1945 through 1965.  · Anyone with known risk factors for hepatitis C.  Sexually transmitted infections (STIs)  · You should be screened each year for STIs, including gonorrhea and chlamydia, if:  ? You are sexually active and are younger than 24 years of age.  ? You are older than 24 years of age and your health care provider tells you that you are at risk for this type of infection.  ? Your sexual activity has changed since you were last screened, and you are at increased risk for chlamydia or gonorrhea. Ask your health care provider if you are at risk.  · Ask your  health care provider about whether you are at high risk for HIV. Your health care provider may recommend a prescription medicine to help prevent HIV infection. If you choose to take medicine to prevent HIV, you should first get tested for HIV. You should then be tested every 3 months for as long as you are taking the medicine.  Follow these instructions at home:  Lifestyle  · Do not use any products that contain nicotine or tobacco, such as cigarettes, e-cigarettes, and chewing tobacco. If you need help quitting, ask your health care provider.  · Do not use street drugs.  · Do not share needles.  · Ask your health care provider for help if you need support or information about quitting drugs.  Alcohol use  · Do not drink alcohol if your health care provider tells you not to drink.  · If you drink alcohol:  ? Limit how much you have to 0-2 drinks a day.  ? Be aware of how much alcohol is in your drink. In the [place] one drink equals one 12 oz bottle of beer (355 mL), one 5 oz glass of wine (148 mL), or one 1½ oz glass of hard liquor (44 mL).  General instructions  · Schedule regular health, dental, and eye exams.  · Stay current with your vaccines.  · Tell your health care provider if:  ? You often feel depressed.  ? You have ever been abused or do not feel safe at home.  Summary  · Adopting a healthy lifestyle and getting preventive care are important in promoting health and wellness.  · Follow your health care provider's instructions about healthy diet, exercising, and getting tested or screened for diseases.  · Follow your health care provider's instructions on monitoring your cholesterol and blood pressure.  This information is not intended to replace advice given to you by your health care provider. Make sure you discuss any questions you have with your health care provider.  Document Revised: 12/11/2019 Document Reviewed: 12/11/2019  Elsevier Patient Education © 2020 [employer]

## 2020-12-09 NOTE — PROGRESS NOTES
Subjective   Eleazar Jeong is a 35 y.o. male.       HPI   Pt. Is here today for routine physical.  He plans to travel to [place] at the end of the week for one week.    Medical/social/family hx reviewed.   Immunizations are UTD.   He will have labs updated today.    Daily smoker - no desire to quit at this time.  Denies any CP; palpitations; SOA; dizziness; headache; trouble with vision.  Appetite is normal.  He denies any bowel or bladder concerns.  Denies any N/V/D.  No blood seen in stools.  No fevers.    Moods stable.    The following portions of the patient's history were reviewed and updated as appropriate: allergies, current medications, past family history, past medical history, past social history, past surgical history and problem list.    Review of Systems   Constitutional: Negative for activity change, appetite change, chills, diaphoresis, fatigue, fever, unexpected weight gain and unexpected weight loss.   HENT: Negative for congestion, ear pain, sinus pressure, sore throat, swollen glands, trouble swallowing and voice change.    Eyes: Negative for blurred vision, photophobia and visual disturbance.   Respiratory: Negative for cough, chest tightness, shortness of breath and wheezing.    Cardiovascular: Negative for chest pain, palpitations and leg swelling.   Gastrointestinal: Negative for abdominal distention, abdominal pain, blood in stool, constipation, diarrhea, nausea, vomiting and indigestion.   Endocrine: Negative for cold intolerance, heat intolerance, polydipsia, polyphagia and polyuria.   Genitourinary: Negative for decreased urine volume, difficulty urinating, dysuria, flank pain, frequency, hematuria and urgency.   Musculoskeletal: Negative for arthralgias, back pain and myalgias.   Skin: Negative for rash and skin lesions.   Neurological: Negative for dizziness, weakness, light-headedness, headache and confusion.   Hematological: Negative for adenopathy.   Psychiatric/Behavioral:  Negative for depressed mood. The patient is not nervous/anxious.        Objective   Physical Exam  Vitals signs reviewed.   Constitutional:       General: He is not in acute distress.     Appearance: Normal appearance. He is well-developed and normal weight.   HENT:      Head: Normocephalic and atraumatic.      Right Ear: External ear normal.      Left Ear: External ear normal.      Nose: Nose normal.      Mouth/Throat:      Mouth: Mucous membranes are moist.      Pharynx: Oropharynx is clear.   Eyes:      Conjunctiva/sclera: Conjunctivae normal.   Neck:      Musculoskeletal: Normal range of motion and neck supple. No muscular tenderness.      Thyroid: No thyromegaly.      Vascular: No JVD.   Cardiovascular:      Rate and Rhythm: Normal rate and regular rhythm.      Pulses: Normal pulses.      Heart sounds: Normal heart sounds. No murmur.   Pulmonary:      Effort: Pulmonary effort is normal. No respiratory distress.      Breath sounds: Normal breath sounds. No wheezing.   Chest:      Chest wall: No tenderness.   Abdominal:      General: Bowel sounds are normal. There is no distension.      Palpations: Abdomen is soft.      Tenderness: There is no abdominal tenderness. There is no right CVA tenderness, left CVA tenderness, guarding or rebound.   Musculoskeletal: Normal range of motion.      Right lower leg: No edema.      Left lower leg: No edema.   Skin:     General: Skin is warm and dry.      Capillary Refill: Capillary refill takes less than 2 seconds.      Findings: No erythema.   Neurological:      General: No focal deficit present.      Mental Status: He is alert and oriented to person, place, and time.      Cranial Nerves: No cranial nerve deficit.      Deep Tendon Reflexes: Reflexes normal.   Psychiatric:         Mood and Affect: Mood normal.           Assessment/Plan   Diagnoses and all orders for this visit:    1. Health maintenance examination (Primary)  Comments:  Medical/social/family hx reviewed.    Immunizations UTD.   Labs today.   Advised to quit smoking  Orders:  -     Comprehensive metabolic panel; Future  -     Lipid panel; Future    Overall well-being, sleep habits and weight goals, can be improved by maintaining healthy diet and regular physical activity. Recommended regular dental care, vision care, consistent seatbelt use, and skin protection.

## 2020-12-23 ENCOUNTER — TELEPHONE (OUTPATIENT)
Dept: ONCOLOGY | Facility: CLINIC | Age: 35
End: 2020-12-23

## 2021-02-09 ENCOUNTER — LAB (OUTPATIENT)
Dept: FAMILY MEDICINE CLINIC | Facility: CLINIC | Age: 36
End: 2021-02-09

## 2021-02-09 ENCOUNTER — OFFICE VISIT (OUTPATIENT)
Dept: FAMILY MEDICINE CLINIC | Facility: CLINIC | Age: 36
End: 2021-02-09

## 2021-02-09 VITALS
HEIGHT: 74 IN | HEART RATE: 81 BPM | OXYGEN SATURATION: 99 % | TEMPERATURE: 97.3 F | SYSTOLIC BLOOD PRESSURE: 122 MMHG | BODY MASS INDEX: 29.39 KG/M2 | DIASTOLIC BLOOD PRESSURE: 83 MMHG | WEIGHT: 229 LBS

## 2021-02-09 DIAGNOSIS — R51.9 INTRACTABLE HEADACHE, UNSPECIFIED CHRONICITY PATTERN, UNSPECIFIED HEADACHE TYPE: ICD-10-CM

## 2021-02-09 DIAGNOSIS — E87.1 HYPONATREMIA: ICD-10-CM

## 2021-02-09 DIAGNOSIS — R74.8 ELEVATED LIVER ENZYMES: Primary | ICD-10-CM

## 2021-02-09 DIAGNOSIS — R74.8 ELEVATED LIVER ENZYMES: ICD-10-CM

## 2021-02-09 LAB
ALBUMIN SERPL-MCNC: 4.6 G/DL (ref 3.5–5.2)
ALBUMIN/GLOB SERPL: 1.3 G/DL
ALP SERPL-CCNC: 71 U/L (ref 39–117)
ALT SERPL W P-5'-P-CCNC: 35 U/L (ref 1–41)
ANION GAP SERPL CALCULATED.3IONS-SCNC: 11.9 MMOL/L (ref 5–15)
AST SERPL-CCNC: 30 U/L (ref 1–40)
BASOPHILS # BLD AUTO: 0.04 10*3/MM3 (ref 0–0.2)
BASOPHILS NFR BLD AUTO: 0.5 % (ref 0–1.5)
BILIRUB SERPL-MCNC: 0.8 MG/DL (ref 0–1.2)
BUN SERPL-MCNC: 13 MG/DL (ref 6–20)
BUN/CREAT SERPL: 13.1 (ref 7–25)
CALCIUM SPEC-SCNC: 9.5 MG/DL (ref 8.6–10.5)
CHLORIDE SERPL-SCNC: 99 MMOL/L (ref 98–107)
CO2 SERPL-SCNC: 25.1 MMOL/L (ref 22–29)
CREAT SERPL-MCNC: 0.99 MG/DL (ref 0.76–1.27)
DEPRECATED RDW RBC AUTO: 45.6 FL (ref 37–54)
EOSINOPHIL # BLD AUTO: 0.05 10*3/MM3 (ref 0–0.4)
EOSINOPHIL NFR BLD AUTO: 0.7 % (ref 0.3–6.2)
ERYTHROCYTE [DISTWIDTH] IN BLOOD BY AUTOMATED COUNT: 13 % (ref 12.3–15.4)
GFR SERPL CREATININE-BSD FRML MDRD: 86 ML/MIN/1.73
GLOBULIN UR ELPH-MCNC: 3.5 GM/DL
GLUCOSE SERPL-MCNC: 91 MG/DL (ref 65–99)
HCT VFR BLD AUTO: 46.9 % (ref 37.5–51)
HGB BLD-MCNC: 16.7 G/DL (ref 13–17.7)
IMM GRANULOCYTES # BLD AUTO: 0.03 10*3/MM3 (ref 0–0.05)
IMM GRANULOCYTES NFR BLD AUTO: 0.4 % (ref 0–0.5)
LYMPHOCYTES # BLD AUTO: 1.95 10*3/MM3 (ref 0.7–3.1)
LYMPHOCYTES NFR BLD AUTO: 26.3 % (ref 19.6–45.3)
MCH RBC QN AUTO: 33.9 PG (ref 26.6–33)
MCHC RBC AUTO-ENTMCNC: 35.6 G/DL (ref 31.5–35.7)
MCV RBC AUTO: 95.1 FL (ref 79–97)
MONOCYTES # BLD AUTO: 0.6 10*3/MM3 (ref 0.1–0.9)
MONOCYTES NFR BLD AUTO: 8.1 % (ref 5–12)
NEUTROPHILS NFR BLD AUTO: 4.75 10*3/MM3 (ref 1.7–7)
NEUTROPHILS NFR BLD AUTO: 64 % (ref 42.7–76)
NRBC BLD AUTO-RTO: 0 /100 WBC (ref 0–0.2)
PLATELET # BLD AUTO: 231 10*3/MM3 (ref 140–450)
PMV BLD AUTO: 11.1 FL (ref 6–12)
POTASSIUM SERPL-SCNC: 3.9 MMOL/L (ref 3.5–5.2)
PROT SERPL-MCNC: 8.1 G/DL (ref 6–8.5)
RBC # BLD AUTO: 4.93 10*6/MM3 (ref 4.14–5.8)
SODIUM SERPL-SCNC: 136 MMOL/L (ref 136–145)
WBC # BLD AUTO: 7.42 10*3/MM3 (ref 3.4–10.8)

## 2021-02-09 PROCEDURE — 36415 COLL VENOUS BLD VENIPUNCTURE: CPT | Performed by: NURSE PRACTITIONER

## 2021-02-09 PROCEDURE — 80053 COMPREHEN METABOLIC PANEL: CPT | Performed by: NURSE PRACTITIONER

## 2021-02-09 PROCEDURE — 99213 OFFICE O/P EST LOW 20 MIN: CPT | Performed by: NURSE PRACTITIONER

## 2021-02-09 PROCEDURE — 85025 COMPLETE CBC W/AUTO DIFF WBC: CPT | Performed by: NURSE PRACTITIONER

## 2021-02-09 NOTE — PROGRESS NOTES
Subjective   Eleazar Jeong is a 35 y.o. male.       HPI   Pt. Is here today for follow up on labs.  CMP in Dec. 2020 showed mild hyponatremia and mild elevation of liver enzymes.  Pt. Was out of the country and unable to come back in for the follow up labs until now.    Currently has a migraine that started a few weeks ago; has been on an off during this time; has taken Benadryl and Motrin. Nothing has really helped.  Pt. Says he doesn't want anything for this today; plans to go home and sleep.     Returned from [place] in mid Dec.    Denies any CP; palpitations; SOA; dizziness;  trouble with vision.       The following portions of the patient's history were reviewed and updated as appropriate: allergies, current medications, past family history, past medical history, past social history, past surgical history and problem list.    Review of Systems   Constitutional: Negative for activity change, appetite change, chills, diaphoresis, fatigue, fever, unexpected weight gain and unexpected weight loss.   HENT: Positive for congestion. Negative for ear discharge, ear pain, postnasal drip, rhinorrhea, sinus pressure, sneezing, sore throat, swollen glands and trouble swallowing.    Eyes: Negative for photophobia and visual disturbance.   Respiratory: Negative for cough, chest tightness, shortness of breath and wheezing.    Cardiovascular: Negative for chest pain, palpitations and leg swelling.   Gastrointestinal: Negative for abdominal distention, abdominal pain, blood in stool, constipation, diarrhea, nausea, vomiting and indigestion.   Genitourinary: Negative for dysuria, flank pain, frequency and urgency.   Musculoskeletal: Negative for arthralgias, back pain and myalgias.   Skin: Negative for rash.   Neurological: Positive for headache. Negative for dizziness, weakness, light-headedness, numbness and confusion.   Hematological: Negative for adenopathy.   Psychiatric/Behavioral: Negative for depressed mood. The  patient is not nervous/anxious.        Objective   Physical Exam  Vitals signs reviewed.   Constitutional:       General: He is not in acute distress.     Appearance: Normal appearance.   HENT:      Head: Normocephalic and atraumatic.      Right Ear: External ear normal.      Left Ear: External ear normal.      Nose: Nose normal.      Mouth/Throat:      Mouth: Mucous membranes are moist.      Pharynx: Oropharynx is clear.   Eyes:      Conjunctiva/sclera: Conjunctivae normal.   Neck:      Musculoskeletal: Normal range of motion and neck supple. No muscular tenderness.   Cardiovascular:      Rate and Rhythm: Normal rate and regular rhythm.      Pulses: Normal pulses.      Heart sounds: Normal heart sounds. No murmur.   Pulmonary:      Effort: Pulmonary effort is normal. No respiratory distress.      Breath sounds: Normal breath sounds. No wheezing.   Chest:      Chest wall: No tenderness.   Abdominal:      General: Abdomen is flat. Bowel sounds are normal. There is no distension.      Palpations: Abdomen is soft.      Tenderness: There is no abdominal tenderness. There is no right CVA tenderness or left CVA tenderness.   Musculoskeletal:      Right lower leg: No edema.      Left lower leg: No edema.   Skin:     General: Skin is warm and dry.      Capillary Refill: Capillary refill takes less than 2 seconds.      Findings: No erythema.   Neurological:      General: No focal deficit present.      Mental Status: He is alert and oriented to person, place, and time.      Cranial Nerves: No cranial nerve deficit.   Psychiatric:         Mood and Affect: Mood normal.           Assessment/Plan   Diagnoses and all orders for this visit:    1. Elevated liver enzymes (Primary)  Comments:  Labs today.   Orders:  -     CBC w AUTO Differential; Future  -     Comprehensive metabolic panel; Future    2. Hyponatremia  Comments:  Labs today.     3. Intractable headache, unspecified chronicity pattern, unspecified headache  type  Comments:  Pt. declines medication today.   Will try Tylenol or Ibuprofen at home; sleep.    Call or to ER with worsening.   Orders:  -     CBC w AUTO Differential; Future  -     Comprehensive metabolic panel; Future

## 2021-02-26 ENCOUNTER — TELEPHONE (OUTPATIENT)
Dept: ONCOLOGY | Facility: CLINIC | Age: 36
End: 2021-02-26

## 2021-02-26 NOTE — TELEPHONE ENCOUNTER
CALLED TO PUSH BACK APPT PER MICHEAL TEAM, ORIGINAL  APPT IS 03/08, PUSH BACK TO 04/08 PLEASE DON'T MOVE, LEFT VM WITH APPT TIME AND DATE

## 2021-03-08 ENCOUNTER — APPOINTMENT (OUTPATIENT)
Dept: LAB | Facility: HOSPITAL | Age: 36
End: 2021-03-08

## 2021-04-08 ENCOUNTER — LAB (OUTPATIENT)
Dept: LAB | Facility: HOSPITAL | Age: 36
End: 2021-04-08

## 2021-04-08 ENCOUNTER — OFFICE VISIT (OUTPATIENT)
Dept: ONCOLOGY | Facility: CLINIC | Age: 36
End: 2021-04-08

## 2021-04-08 VITALS
TEMPERATURE: 96.8 F | SYSTOLIC BLOOD PRESSURE: 141 MMHG | DIASTOLIC BLOOD PRESSURE: 80 MMHG | HEIGHT: 60 IN | BODY MASS INDEX: 42.88 KG/M2 | WEIGHT: 218.4 LBS | RESPIRATION RATE: 20 BRPM | HEART RATE: 82 BPM

## 2021-04-08 DIAGNOSIS — D47.2 MONOCLONAL GAMMOPATHY: ICD-10-CM

## 2021-04-08 DIAGNOSIS — D47.2 MGUS (MONOCLONAL GAMMOPATHY OF UNKNOWN SIGNIFICANCE): ICD-10-CM

## 2021-04-08 DIAGNOSIS — D47.2 MGUS (MONOCLONAL GAMMOPATHY OF UNKNOWN SIGNIFICANCE): Primary | ICD-10-CM

## 2021-04-08 LAB
ALBUMIN SERPL-MCNC: 4.1 G/DL (ref 3.5–5.2)
ALBUMIN/GLOB SERPL: 1.1 G/DL
ALP SERPL-CCNC: 65 U/L (ref 39–117)
ALT SERPL W P-5'-P-CCNC: 47 U/L (ref 1–41)
ANION GAP SERPL CALCULATED.3IONS-SCNC: 9 MMOL/L (ref 5–15)
AST SERPL-CCNC: 35 U/L (ref 1–40)
BASOPHILS # BLD AUTO: 0.02 10*3/MM3 (ref 0–0.2)
BASOPHILS NFR BLD AUTO: 0.4 % (ref 0–1.5)
BILIRUB SERPL-MCNC: 0.6 MG/DL (ref 0–1.2)
BUN SERPL-MCNC: 11 MG/DL (ref 6–20)
BUN/CREAT SERPL: 11.6 (ref 7–25)
CALCIUM SPEC-SCNC: 8.8 MG/DL (ref 8.6–10.5)
CHLORIDE SERPL-SCNC: 103 MMOL/L (ref 98–107)
CO2 SERPL-SCNC: 24 MMOL/L (ref 22–29)
CREAT SERPL-MCNC: 0.95 MG/DL (ref 0.76–1.27)
DEPRECATED RDW RBC AUTO: 44.7 FL (ref 37–54)
EOSINOPHIL # BLD AUTO: 0.11 10*3/MM3 (ref 0–0.4)
EOSINOPHIL NFR BLD AUTO: 2 % (ref 0.3–6.2)
ERYTHROCYTE [DISTWIDTH] IN BLOOD BY AUTOMATED COUNT: 13.5 % (ref 12.3–15.4)
GFR SERPL CREATININE-BSD FRML MDRD: 90 ML/MIN/1.73
GLOBULIN UR ELPH-MCNC: 3.9 GM/DL
GLUCOSE SERPL-MCNC: 111 MG/DL (ref 65–99)
HCT VFR BLD AUTO: 42.1 % (ref 37.5–51)
HGB BLD-MCNC: 14.9 G/DL (ref 13–17.7)
IGA1 MFR SER: 88 MG/DL (ref 70–400)
IGG1 SER-MCNC: 2206 MG/DL (ref 700–1600)
IGM SERPL-MCNC: 48 MG/DL (ref 40–230)
LYMPHOCYTES # BLD AUTO: 1.53 10*3/MM3 (ref 0.7–3.1)
LYMPHOCYTES NFR BLD AUTO: 27.8 % (ref 19.6–45.3)
MCH RBC QN AUTO: 32.7 PG (ref 26.6–33)
MCHC RBC AUTO-ENTMCNC: 35.4 G/DL (ref 31.5–35.7)
MCV RBC AUTO: 92.5 FL (ref 79–97)
MONOCYTES # BLD AUTO: 0.62 10*3/MM3 (ref 0.1–0.9)
MONOCYTES NFR BLD AUTO: 11.3 % (ref 5–12)
NEUTROPHILS NFR BLD AUTO: 3.22 10*3/MM3 (ref 1.7–7)
NEUTROPHILS NFR BLD AUTO: 58.5 % (ref 42.7–76)
PLATELET # BLD AUTO: 188 10*3/MM3 (ref 140–450)
PMV BLD AUTO: 10.6 FL (ref 6–12)
POTASSIUM SERPL-SCNC: 3.5 MMOL/L (ref 3.5–5.2)
PROT SERPL-MCNC: 8 G/DL (ref 6–8.5)
RBC # BLD AUTO: 4.55 10*6/MM3 (ref 4.14–5.8)
SODIUM SERPL-SCNC: 136 MMOL/L (ref 136–145)
WBC # BLD AUTO: 5.5 10*3/MM3 (ref 3.4–10.8)

## 2021-04-08 PROCEDURE — 99406 BEHAV CHNG SMOKING 3-10 MIN: CPT | Performed by: INTERNAL MEDICINE

## 2021-04-08 PROCEDURE — 85025 COMPLETE CBC W/AUTO DIFF WBC: CPT

## 2021-04-08 PROCEDURE — 80053 COMPREHEN METABOLIC PANEL: CPT

## 2021-04-08 PROCEDURE — 36415 COLL VENOUS BLD VENIPUNCTURE: CPT

## 2021-04-08 PROCEDURE — 99214 OFFICE O/P EST MOD 30 MIN: CPT | Performed by: INTERNAL MEDICINE

## 2021-04-08 PROCEDURE — 82784 ASSAY IGA/IGD/IGG/IGM EACH: CPT

## 2021-04-09 LAB
ALBUMIN SERPL ELPH-MCNC: 3.9 G/DL (ref 2.9–4.4)
ALBUMIN/GLOB SERPL: 1 {RATIO} (ref 0.7–1.7)
ALPHA1 GLOB SERPL ELPH-MCNC: 0.3 G/DL (ref 0–0.4)
ALPHA2 GLOB SERPL ELPH-MCNC: 0.6 G/DL (ref 0.4–1)
B-GLOBULIN SERPL ELPH-MCNC: 0.9 G/DL (ref 0.7–1.3)
GAMMA GLOB SERPL ELPH-MCNC: 2 G/DL (ref 0.4–1.8)
GLOBULIN SER CALC-MCNC: 3.8 G/DL (ref 2.2–3.9)
KAPPA LC FREE SER-MCNC: 56 MG/L (ref 3.3–19.4)
KAPPA LC FREE/LAMBDA FREE SER: 7.27 {RATIO} (ref 0.26–1.65)
LABORATORY COMMENT REPORT: ABNORMAL
LAMBDA LC FREE SERPL-MCNC: 7.7 MG/L (ref 5.7–26.3)
M PROTEIN SERPL ELPH-MCNC: 1.6 G/DL
PROT PATTERN SERPL ELPH-IMP: ABNORMAL
PROT SERPL-MCNC: 7.7 G/DL (ref 6–8.5)

## 2021-07-12 ENCOUNTER — LAB (OUTPATIENT)
Dept: LAB | Facility: HOSPITAL | Age: 36
End: 2021-07-12

## 2021-07-12 DIAGNOSIS — D47.2 MGUS (MONOCLONAL GAMMOPATHY OF UNKNOWN SIGNIFICANCE): ICD-10-CM

## 2021-07-12 LAB
ALBUMIN SERPL-MCNC: 4.3 G/DL (ref 3.5–5.2)
ALBUMIN/GLOB SERPL: 1.1 G/DL
ALP SERPL-CCNC: 68 U/L (ref 39–117)
ALT SERPL W P-5'-P-CCNC: 23 U/L (ref 1–41)
ANION GAP SERPL CALCULATED.3IONS-SCNC: 11 MMOL/L (ref 5–15)
AST SERPL-CCNC: 19 U/L (ref 1–40)
BASOPHILS # BLD AUTO: 0.03 10*3/MM3 (ref 0–0.2)
BASOPHILS NFR BLD AUTO: 0.4 % (ref 0–1.5)
BILIRUB SERPL-MCNC: 0.6 MG/DL (ref 0–1.2)
BUN SERPL-MCNC: 8 MG/DL (ref 6–20)
BUN/CREAT SERPL: 8.4 (ref 7–25)
CALCIUM SPEC-SCNC: 9.1 MG/DL (ref 8.6–10.5)
CHLORIDE SERPL-SCNC: 101 MMOL/L (ref 98–107)
CO2 SERPL-SCNC: 27 MMOL/L (ref 22–29)
CREAT SERPL-MCNC: 0.95 MG/DL (ref 0.76–1.27)
DEPRECATED RDW RBC AUTO: 43.3 FL (ref 37–54)
EOSINOPHIL # BLD AUTO: 0.05 10*3/MM3 (ref 0–0.4)
EOSINOPHIL NFR BLD AUTO: 0.6 % (ref 0.3–6.2)
ERYTHROCYTE [DISTWIDTH] IN BLOOD BY AUTOMATED COUNT: 12.7 % (ref 12.3–15.4)
GFR SERPL CREATININE-BSD FRML MDRD: 90 ML/MIN/1.73
GLOBULIN UR ELPH-MCNC: 3.8 GM/DL
GLUCOSE SERPL-MCNC: 89 MG/DL (ref 65–99)
HCT VFR BLD AUTO: 41.8 % (ref 37.5–51)
HGB BLD-MCNC: 14.7 G/DL (ref 13–17.7)
IGA1 MFR SER: 85 MG/DL (ref 70–400)
IGG1 SER-MCNC: 2458 MG/DL (ref 700–1600)
IGM SERPL-MCNC: 43 MG/DL (ref 40–230)
LYMPHOCYTES # BLD AUTO: 1.86 10*3/MM3 (ref 0.7–3.1)
LYMPHOCYTES NFR BLD AUTO: 23 % (ref 19.6–45.3)
MCH RBC QN AUTO: 33.6 PG (ref 26.6–33)
MCHC RBC AUTO-ENTMCNC: 35.2 G/DL (ref 31.5–35.7)
MCV RBC AUTO: 95.7 FL (ref 79–97)
MONOCYTES # BLD AUTO: 0.45 10*3/MM3 (ref 0.1–0.9)
MONOCYTES NFR BLD AUTO: 5.6 % (ref 5–12)
NEUTROPHILS NFR BLD AUTO: 5.69 10*3/MM3 (ref 1.7–7)
NEUTROPHILS NFR BLD AUTO: 70.4 % (ref 42.7–76)
PLATELET # BLD AUTO: 197 10*3/MM3 (ref 140–450)
PMV BLD AUTO: 10.9 FL (ref 6–12)
POTASSIUM SERPL-SCNC: 3.8 MMOL/L (ref 3.5–5.2)
PROT SERPL-MCNC: 8.1 G/DL (ref 6–8.5)
RBC # BLD AUTO: 4.37 10*6/MM3 (ref 4.14–5.8)
SODIUM SERPL-SCNC: 139 MMOL/L (ref 136–145)
WBC # BLD AUTO: 8.08 10*3/MM3 (ref 3.4–10.8)

## 2021-07-12 PROCEDURE — 36415 COLL VENOUS BLD VENIPUNCTURE: CPT

## 2021-07-12 PROCEDURE — 82784 ASSAY IGA/IGD/IGG/IGM EACH: CPT

## 2021-07-12 PROCEDURE — 85025 COMPLETE CBC W/AUTO DIFF WBC: CPT

## 2021-07-12 PROCEDURE — 80053 COMPREHEN METABOLIC PANEL: CPT

## 2021-07-13 LAB
ALBUMIN SERPL ELPH-MCNC: 3.9 G/DL (ref 2.9–4.4)
ALBUMIN/GLOB SERPL: 1.1 {RATIO} (ref 0.7–1.7)
ALPHA1 GLOB SERPL ELPH-MCNC: 0.3 G/DL (ref 0–0.4)
ALPHA2 GLOB SERPL ELPH-MCNC: 0.6 G/DL (ref 0.4–1)
B-GLOBULIN SERPL ELPH-MCNC: 0.8 G/DL (ref 0.7–1.3)
GAMMA GLOB SERPL ELPH-MCNC: 1.9 G/DL (ref 0.4–1.8)
GLOBULIN SER CALC-MCNC: 3.6 G/DL (ref 2.2–3.9)
KAPPA LC FREE SER-MCNC: 63.2 MG/L (ref 3.3–19.4)
KAPPA LC FREE/LAMBDA FREE SER: 7.52 {RATIO} (ref 0.26–1.65)
LABORATORY COMMENT REPORT: ABNORMAL
LAMBDA LC FREE SERPL-MCNC: 8.4 MG/L (ref 5.7–26.3)
M PROTEIN SERPL ELPH-MCNC: 1.5 G/DL
PROT PATTERN SERPL ELPH-IMP: ABNORMAL
PROT SERPL-MCNC: 7.5 G/DL (ref 6–8.5)

## 2021-08-05 NOTE — PROGRESS NOTES
HEMATOLOGY ONCOLOGY FOLLOW UP        Patient name: Eleazar Jeong  : 1985  MRN: [id number]  Primary Care Physician: Ghislaine Wilks APRN  Referring Physician: Ghislaine Wilks APRN  Reason For Consult:     Chief Complaint   Patient presents with   • Appointment     High risk IgG kappa MGUS   • Follow-up   Monoclonal gammopathy  History of polycythemia    History of Present Illness:    1. IgG kappa monoclonal gammopathy:    2018 to 2019 patient has not followed up in our office.  Patient was referred back by MARTIN Lacey due to IgG kappa monoclonal gammopathy  · 2019: Serum protein electrophoresis shows M protein 1.8 g/dL  · 2019 IgA 116, IgG 2206 high, IgM 55, serum immunofixation shows IgG kappa monoclonal gammopathy.  · 2019 creatinine 0.84, BUN 12,  high, ALT 70, iron 130, ferritin 34.3, iron saturation 38%, TIBC 346  WBC 6.7, hemoglobin 14.9, platelets 184, MCV 93.7   · 2019: Skeletal survey: No evidence of multiple myeloma.  No lytic or blastic lesions.  · 2019  SPEP M protein 1.38 free kappa light chains 37.6 high, Urine protein electrophoresis M spike 111.7, M spike to 3.38.,  24-hour urine protein is 260, urine immunofixation shows free kappa paraprotein peak and also a small IgG kappa paraprotein peak.  · 2019 calcium 9.5, creatinine 0.8, ALT 65 high, AST 57 high, hepatitis panel negative,  · 2019 Skeletal survey: No evidence of bone lytic lesions.  · 2019:UPEP shows to M spike's 4.5% and 1.3%.  24-hour urine protein to 60 high,  · 2020: WBC 9.2, hemoglobin 17.5, MCV 96.1, platelets 201  · 2020 Bone marrow aspiration and biopsy: Atypical marrow plasmacytosis 5 to 10%, consistent with plasma cell neoplasm.  Normocellular to focally mildly hypocellular marrow for age, 40 to 50%, with trilineage hematopoiesis.  No increase in reticulin fibrosis.  Storage iron present.  Flow cytometry: Clonal plasma  cell population detected 1% of analyzed cells, with aberrant expression of  and cytoplasmic kappa light chain restriction.,  Normal cytogenetics 46 XY  · 3/11/2020 IgG 2104, IgM 51, IgA 101, SPEP shows M protein 1.7  . Free kappa light chains 48.1, free lambda light chain 6.9, kappa lambda ratio 6.97 high, WBC 7.8, hemoglobin 14.9, platelets 206, MCV 93.6  · 3/11/2020:  high,  high, albumin 4.3, alk phos 56, bilirubin 0.6  · 3/18/2020 WBC 7.7, hemoglobin 16, MCV 94, platelets 202,  · 3/18/2020: IgG 2304 high, IgM 53, IgA 108, free kappa light chains 55.8 high, free lambda light chains 8.2, kappa lambda ratio 6.8 high  · 4/28/2020: Liver biopsy: Benign liver with no significant histopathology.  No evidence of malignancy.  Esophageal biopsy shows glandular mucosa with intestinal metaplasia consistent with Matson's esophagus.  Negative for dysplasia.   · 6/3/2020: CMP normal, WBC 6.9, hemoglobin 14.9, platelets 220, MCV 95.2  · 6/10/2020: WBC 8.39, hemoglobin 15.1, platelets 212, SPEP shows M protein 1.8 g/dL.,  Free kappa light chains 51.2 high, free lambda light chains 11.3, kappa lambda ratio 4.53 high, IgG 2242 high,  · 8/6/2020: WBC 8, hemoglobin 16.1, platelets 186, IgA 94, IgG 1940 high, IgM 54 low, free kappa light chains 3695 high, free lambda light chain 6.75, kappa lambda ratio 5.47 high,Calcium 9.3, AST ALT normal, albumin 4.2, TSH 0.6, total testosterone 482, SPEP shows M protein 1.55,  · 8/20/2020: CTs soft tissue neck with and without contrast: No acute process seen in the neck.  No abnormal soft tissue mass or fluid collection.  No cervical adenopathy.  · 8/21/2020: PET CT scan: No convincing hypermetabolic osseous malignancy.  Right prepatellar soft tissue thickening and fluid with diffuse mild metabolic activity suggestive of inflammatory process.    · 9/2/2020:SPEP shows M protein 1.8 g/dL, creatinine 0.94, LFTs normal, creatinine 0.94, LFTs normal, WBC 6.9, hemoglobin 16,  platelets 202, MCV 92.1  · 12/8/2020: CMP normal, hemoglobin 17, platelets 226, SPEP shows M protein 1.6, IgG 2685 high, IgA 116, IgM 69, kappa lambda ratio 5.46 high,  · 2/9/2021: WBC 7.4, hemoglobin 16.7, platelets 231, MCV 95.1, CMP normal,        2.  History of polycythemia:  Patient presented to PCP Dr. Ann’s office on 4/11/16 that showed hemoglobin of 18.3.  MCV was  95.0.  He is referred to us for polycythemia.  The patient has a history of chronic anxiety and   depression.   · 4/11/16 - WBC 9.2, hemoglobin 18.3, MCV 95.0, platelet count 182,000.  Differential normal.    Vitamin B12 554.  · 5/10/2016, the patient presents for initial consultation for polycythemia.  He reports symptoms of occasional headaches.  He does have history of migraines.  He also reports some  nonspecific vision problems.  He also reports weight loss of around 30 pounds during the past six months.  He denies any fevers or any lymph node enlargement.  He also reports some night sweats.  He does feel tired all the time.  He denies having any snoring, but he lives alone.  He does report some symptoms of excessive daytime sleepiness.  He smokes about 3-4 cigars per day.  He  denies having a CBC in the past.    ·  5/10/16 - Creatinine 0.87.  LFTs normal.  WBC 7.3, hemoglobin 16.7, MCV 94.5, platelet count  189,000.  Erythropoietin level 10.6.  JAK2 V617 mutations negative.  Exon 12 and exon 13 mutation  negative.   mutation negative.    ·  6/8/16 - Bone marrow biopsy:  Hemodiluted hypocellular aspirate.  However, flow cytometry is  adequate and it does not reveal any abnormal immunophenotype.  Cytogenetics shows normal  karyotype.  The clot section showed a single bone marrow particle.  There was no evidence of  ringed sideroblasts.    · 6/30/16 - WBC 8.7, hemoglobin 17.6, MCV 94, platelet count 166,000.  · 9/1/16 - WBC 9.4, hemoglobin 16.8, MCV 94.9, platelet count 204,000.  Patient underwent   phlebotomy.  ·  1/5/17 - Hemoglobin  16.5.  Patient underwent phlebotomy.  ·  1/9/17 - WBC 9.8, hemoglobin 15.3, platelet count 195,000.    · 5/12/17 - WBC 6.9, hemoglobin 15.9, platelet count 185,000, MCV 91.3.    · 9/15/17 - WBC 9.4, hemoglobin 16.2, platelet count 201,000, MCV 91.1.   · 12/6/17 - EGD:  Probable Maston’s esophagus. Antral gastritis.  Glandular mucosa with focal intestinal metaplasia, consistent with Matson’s.    ·  3/21/18 - Brain MRI with and without contrast:  A 2.3 cm structure along the superior margin of the cerebellum at the midline, consistent arachnoid cyst.       Subjective    Patient states that he has lost some weight.  Continues to smoke.  He has diabetes.  States also feels weak.  Denies any frequent infections     The following portions of the patient's history were reviewed and updated as appropriate: allergies, current medications, past family history, past medical history, past social history, past surgical history and problem list.       Past Medical History:   Diagnosis Date   • Anxiety    • Benign monoclonal gammopathy     dr. quiroz   • Colon polyp    • Depression    • Elevated liver enzymes 04/2020   • Erectile dysfunction    • GERD (gastroesophageal reflux disease)    • Headache    • Hypoglycemia    • Left sided abdominal pain    • Low back pain    • Migraine    • Nausea    • Pneumonia 03/2019   • Tremor    • Visual impairment     wears glasses       Past Surgical History:   Procedure Laterality Date   • COLONOSCOPY     • UPPER ENDOSCOPIC ULTRASOUND W/ FNA N/A 4/28/2020    Procedure: Esophagogastroduodenoscopy and endoscopic ultrasound with biopsy of liver, biopsy of esophagus;  Surgeon: Danilo Balderrama MD;  Location: [facility] ENDOSCOPY;  Service: Gastroenterology;  Laterality: N/A;  gastritis, hiatel hernia, barretts esophagus       Current Outpatient Medications:   •  colestipol (COLESTID) 1 g tablet, Take 1 g by mouth Daily. Sometimes takes 2 per day as instructed, Disp: , Rfl:   •  diphenhydrAMINE (BENADRYL  ALLERGY) 25 MG tablet, Take 25 mg by mouth Every 6 (Six) Hours As Needed., Disp: , Rfl:   •  diphenoxylate-atropine (LOMOTIL) 2.5-0.025 MG per tablet, TAKE 1 TABLET BY MOUTH EVERY 6 HOURS AS NEEDED FOR DIARRHEA, Disp: , Rfl:   •  ibuprofen (ADVIL,MOTRIN) 800 MG tablet, Take 800 mg by mouth Every 8 (Eight) Hours. Stop now for procedure, Disp: , Rfl:   •  Loperamide HCl (IMODIUM PO), 1 dose Daily As Needed., Disp: , Rfl:   •  meclizine (ANTIVERT) 12.5 MG tablet, 1-2 tabs PO TID PRN dizziness, Disp: 60 tablet, Rfl: 1  •  montelukast (SINGULAIR) 10 MG tablet, Take 10 mg by mouth Every Night., Disp: , Rfl:   •  omeprazole (priLOSEC) 40 MG capsule, Take 40 mg by mouth Daily., Disp: , Rfl:   •  sildenafil (REVATIO) 20 MG tablet, Take 20 mg by mouth Daily As Needed. Do not use 24 hours prior to procedure, Disp: , Rfl: 99  •  sildenafil (VIAGRA) 100 MG tablet, Take 100 mg by mouth., Disp: , Rfl:     Allergies   Allergen Reactions   • Amoxicillin Unknown (See Comments)   • Erythromycin Hives   • Levofloxacin Hives   • Penicillin G Unknown (See Comments)   • Penicillins Other (See Comments)       Family History   Problem Relation Age of Onset   • Diabetes Mother    • Vision loss Mother    • COPD Paternal Aunt    • Vision loss Paternal Aunt    • Diabetes Maternal Grandmother    • Vision loss Maternal Grandmother    • Cancer Maternal Grandmother    • Heart disease Paternal Grandmother    • Stroke Paternal Grandmother    • Heart disease Paternal Grandfather    • Vision loss Paternal Grandfather    • Vision loss Father    • Vision loss Sister        Cancer-related family history includes Cancer in his maternal grandmother.    Social History     Tobacco Use   • Smoking status: Current Every Day Smoker     Packs/day: 1.00     Years: 17.00     Pack years: 17.00     Types: Cigarettes, Cigars, Electronic Cigarette     Start date: 8/2/2002   • Smokeless tobacco: Never Used   Substance Use Topics   • Alcohol use: Not Currently   • Drug  "use: Not Currently     I have reviewed the history of present illness, past medical history, family history, social history, lab results, all notes and other records since the patient was last seen on  11/27/2019.    ROS:     Objective:  Vital signs:  Vitals:    08/09/21 0817   Resp: 18   Temp: 97 °F (36.1 °C)   Weight: 94.8 kg (209 lb)   Height: 188 cm (74\")   PainSc:   4   PainLoc: Generalized  Comment: head and side     ECOG  (0) Fully active, able to carry on all predisease performance without restriction    Physical Exam:   Physical Exam   Constitutional: He is oriented to person, place, and time. He appears well-developed. No distress.   HENT:   Head: Normocephalic and atraumatic.   Eyes: Conjunctivae are normal. Right eye exhibits no discharge. Left eye exhibits no discharge. No scleral icterus.   Wears glasses   Neck: No thyromegaly present.   Cardiovascular: Normal rate, regular rhythm, normal heart sounds and normal pulses. Exam reveals no gallop and no friction rub.   Pulmonary/Chest: Effort normal. No stridor. No respiratory distress. He has no wheezes.   Slightly decreased air entry bilaterally   Abdominal: Soft. Normal appearance and bowel sounds are normal. He exhibits no mass. There is no abdominal tenderness. There is no rebound and no guarding.   Musculoskeletal: Normal range of motion. No tenderness.      Comments: Healing surgical scar noted on the right knee   Lymphadenopathy:     He has no cervical adenopathy.   Neurological: He is alert and oriented to person, place, and time. He exhibits normal muscle tone.   Skin: Skin is warm. No rash noted. He is not diaphoretic. No erythema.   Multiple tattoos   Psychiatric: His behavior is normal. Mood and thought content normal.   Nursing note and vitals reviewed.  I have reexamined the patient and the results are consistent with the previously documented exam. Shabbir Preciado MD       Lab Results - Last 18 Months   Lab Units 07/12/21  0808 04/08/21  0901 " 02/09/21  0915   WBC 10*3/mm3 8.08 5.50 7.42   HEMOGLOBIN g/dL 14.7 14.9 16.7   HEMATOCRIT % 41.8 42.1 46.9   PLATELETS 10*3/mm3 197 188 231   MCV fL 95.7 92.5 95.1     Lab Results - Last 18 Months   Lab Units 07/12/21  0808 04/08/21  0901 02/09/21  0915   SODIUM mmol/L 139 136 136   POTASSIUM mmol/L 3.8 3.5 3.9   CHLORIDE mmol/L 101 103 99   CO2 mmol/L 27.0 24.0 25.1   BUN mg/dL 8 11 13   CREATININE mg/dL 0.95 0.95 0.99   CALCIUM mg/dL 9.1 8.8 9.5   BILIRUBIN mg/dL 0.6 0.6 0.8   ALK PHOS U/L 68 65 71   ALT (SGPT) U/L 23 47* 35   AST (SGOT) U/L 19 35 30   GLUCOSE mg/dL 89 111* 91       Lab Results   Component Value Date    GLUCOSE 89 07/12/2021    BUN 8 07/12/2021    CREATININE 0.95 07/12/2021    EGFRIFNONA 90 07/12/2021    BCR 8.4 07/12/2021    K 3.8 07/12/2021    CO2 27.0 07/12/2021    CALCIUM 9.1 07/12/2021    PROTENTOTREF 7.5 07/12/2021    ALBUMIN 4.30 07/12/2021    ALBUMIN 3.9 07/12/2021    LABIL2 1.1 07/12/2021    AST 19 07/12/2021    ALT 23 07/12/2021       No results for input(s): APTT, INR, PTT in the last 75443 hours.    Lab Results   Component Value Date    IRON 130 12/13/2019    TIBC 346 12/13/2019    FERRITIN 34.35 12/13/2019       No results found for: FOLATE    No results found for: OCCULTBLD    No results found for: RETICCTPCT    Lab Results   Component Value Date    TQIPBNDQ01 319 06/13/2019     Lab Results   Component Value Date    SPEP Comment 07/12/2021     No results found for: LDH, URICACID  Lab Results   Component Value Date    SEDRATE 3 11/05/2019     No results found for: FIBRINOGEN, HAPTOGLOBIN  Lab Results   Component Value Date    PTT 24.9 01/17/2020    INR 1.04 01/17/2020     No results found for:   No results found for: CEA  No components found for: CA-19-9  No results found for: PSA      Assessment/Plan     Assessment:  1.  High risk IgG kappa MGUS: Diagnosed November 2019.  High-intermediate risk.  PET CT scan on 8/21/2020 does not show any evidence of bone lesions..  No crab  symptoms..  M protein was 1.8 initially, but more recently it was around 1.7.  Skeletal survey was negative.  He does not report any frequent infections..  Bone marrow biopsy shows about 5 to 10% plasma cells.  Cytogenetics showed normal karyotype.  They were unable to run the FISH panel.  Urine immunofixation is also positive.  M protein remains stable as of July 2021.  2. History of elevated liver enzymes: ALT is up to 193 and AST up to 602 as of 3/11/2020.  Patient is following with gastroenterology.  .  Status post liver biopsy which did not reveal any pathology.  Repeat LFTs have normalized.  3. History of secondary polycythemia: Seems resolved. Bone marrow biopsy was unremarkable.  JAK2 mutation and EPO  level do not suggest any evidence of polycythemia vera.  He has secondary polycythemia likely due  to tobacco use and from sleep apnea.  The patient continues to smoke.  Patient did not want to  have a sleep study.  He has not required phlebotomy for the last 3-4 years His hemoglobin has stabilized.   4. History of Matson’s esophagus.  He follows up with Dr. Hernandez.  Will need surveillance endoscopies.  5. History of tobacco use: Smoking cessation counseling was provided.    6. Hypoglycemic episodes       PLAN:    1. We will continue to monitor SPEP, free light chain assay, CBC CMP.  Repeat labs in 4 months.   2. Weight loss: Patient has diarrhea, .  It can cause weight loss.  We will have dietitian see patient to help with the nutritional choices.  3. Continue follow-up with gastroenterology for Matson's esophagus, diarrhea, weight loss etc.  4. Will need repeat endoscopies for his Matson's esophagus.  He has this every 1 to 2 years  5. We will follow patient back in 4 months with repeat myeloma labs..       I counselled Eleazar of the risks of continuing to use tobacco and cessation.    During this visit, I spent 3-10 minutes counseling the patient regarding tobacco cessation.       There are no diagnoses  linked to this encounter.  No orders of the defined types were placed in this encounter.         I have reviewed and confirmed the accuracy of the patient's history: Chief complaint, HPI, ROS and Subjective as entered by the MA/LPN/RN. Shabbir Preciado MD 08/09/21        Electronically signed by Shabbir Preciado MD, 08/09/21, 9:19 AM EDT.

## 2021-08-09 ENCOUNTER — DOCUMENTATION (OUTPATIENT)
Dept: ONCOLOGY | Facility: CLINIC | Age: 36
End: 2021-08-09

## 2021-08-09 ENCOUNTER — APPOINTMENT (OUTPATIENT)
Dept: LAB | Facility: HOSPITAL | Age: 36
End: 2021-08-09

## 2021-08-09 ENCOUNTER — OFFICE VISIT (OUTPATIENT)
Dept: ONCOLOGY | Facility: CLINIC | Age: 36
End: 2021-08-09

## 2021-08-09 VITALS — TEMPERATURE: 97 F | WEIGHT: 209 LBS | HEIGHT: 74 IN | RESPIRATION RATE: 18 BRPM | BODY MASS INDEX: 26.82 KG/M2

## 2021-08-09 DIAGNOSIS — D75.1 POLYCYTHEMIA: ICD-10-CM

## 2021-08-09 DIAGNOSIS — D47.2 MGUS (MONOCLONAL GAMMOPATHY OF UNKNOWN SIGNIFICANCE): Primary | ICD-10-CM

## 2021-08-09 PROCEDURE — 99214 OFFICE O/P EST MOD 30 MIN: CPT | Performed by: INTERNAL MEDICINE

## 2021-08-09 RX ORDER — MONTELUKAST SODIUM 10 MG/1
10 TABLET ORAL NIGHTLY
COMMUNITY

## 2021-08-18 NOTE — PROGRESS NOTES
"                 Nutrition Assessment  Eleazar Jeong    Anthropometrics  Height: 6'2\"  Weight: 208.9#  BMI: 26.8  Weight Hx:   (11/5/19) 205#  (1/15/20) 220#  (3/18/20) 222.6#  (9/9/20) 214#  (2/9/21) 229.2#  (4/8/21) 218.4#  IBW: 190# (109.9%)  UBW: 220# (94.9%)    Nutrition Questionnaire    Food Allergies: Pt denied allergies at this time    Chewing/Swallowing Problems: Pt w/o teeth or dentures, he avoids chewy and crunchy foods. Pt denies issues swallowing.    Who does the cooking & shopping: Pt does, pt lives with his parents    Nausea/Vomiting/Diarrhea: Pt reports feeling nausea daily, and complaining of d/c. Pt denies vomiting.    Appetite: (poor) 1 2 3 4 5 6 7 8 9 10 (excellent): 6    24-Hour Diet Recall:   Breakfast - [employer]: beef tacos, regular soda  Lunch - green beans, mashed potatoes, regular soda  Dinner - nothing  Snacks - crackers  Water - > 64 ounces/day    Discussion: Met the pt to answer questions/concerns related to hypoglycemia. Pt said he manages his blood sugar by avoiding all sugar, however, based on his 24-diet recall, that does not seem to be true. Pt said he's been dealing with hypoglycemia for years doesn't have any questions at this time.     All questions and concerns were addressed and answered. I provided my contact information and encouraged him to call or schedule an appointment as needed.    Livan Leach, MS,RD,LD-[place],CD-[place]  Registered Dietitian            "

## 2021-12-01 ENCOUNTER — OFFICE (AMBULATORY)
Dept: URBAN - METROPOLITAN AREA CLINIC 64 | Facility: CLINIC | Age: 36
End: 2021-12-01

## 2021-12-01 VITALS
HEART RATE: 75 BPM | SYSTOLIC BLOOD PRESSURE: 124 MMHG | HEIGHT: 74 IN | WEIGHT: 219 LBS | DIASTOLIC BLOOD PRESSURE: 93 MMHG

## 2021-12-01 DIAGNOSIS — R19.7 DIARRHEA, UNSPECIFIED: ICD-10-CM

## 2021-12-01 PROCEDURE — 99214 OFFICE O/P EST MOD 30 MIN: CPT | Performed by: INTERNAL MEDICINE

## 2021-12-01 RX ORDER — COLESTIPOL HYDROCHLORIDE 1 G/1
TABLET, FILM COATED ORAL
Qty: 180 | Refills: 3 | Status: COMPLETED
End: 2022-06-27

## 2021-12-03 ENCOUNTER — LAB (OUTPATIENT)
Dept: LAB | Facility: HOSPITAL | Age: 36
End: 2021-12-03

## 2021-12-03 DIAGNOSIS — D47.2 MGUS (MONOCLONAL GAMMOPATHY OF UNKNOWN SIGNIFICANCE): ICD-10-CM

## 2021-12-03 DIAGNOSIS — D75.1 POLYCYTHEMIA: ICD-10-CM

## 2021-12-03 LAB
ALBUMIN SERPL-MCNC: 4.4 G/DL (ref 3.5–5.2)
ALBUMIN/GLOB SERPL: 1.2 G/DL
ALP SERPL-CCNC: 62 U/L (ref 39–117)
ALT SERPL W P-5'-P-CCNC: 39 U/L (ref 1–41)
ANION GAP SERPL CALCULATED.3IONS-SCNC: 13 MMOL/L (ref 5–15)
AST SERPL-CCNC: 23 U/L (ref 1–40)
BASOPHILS # BLD AUTO: 0.03 10*3/MM3 (ref 0–0.2)
BASOPHILS NFR BLD AUTO: 0.4 % (ref 0–1.5)
BILIRUB SERPL-MCNC: 0.6 MG/DL (ref 0–1.2)
BUN SERPL-MCNC: 11 MG/DL (ref 6–20)
BUN/CREAT SERPL: 11.6 (ref 7–25)
CALCIUM SPEC-SCNC: 9 MG/DL (ref 8.6–10.5)
CHLORIDE SERPL-SCNC: 103 MMOL/L (ref 98–107)
CO2 SERPL-SCNC: 24 MMOL/L (ref 22–29)
CREAT SERPL-MCNC: 0.95 MG/DL (ref 0.76–1.27)
DEPRECATED RDW RBC AUTO: 45.5 FL (ref 37–54)
EOSINOPHIL # BLD AUTO: 0.1 10*3/MM3 (ref 0–0.4)
EOSINOPHIL NFR BLD AUTO: 1.3 % (ref 0.3–6.2)
ERYTHROCYTE [DISTWIDTH] IN BLOOD BY AUTOMATED COUNT: 13.3 % (ref 12.3–15.4)
GFR SERPL CREATININE-BSD FRML MDRD: 90 ML/MIN/1.73
GLOBULIN UR ELPH-MCNC: 3.6 GM/DL
GLUCOSE SERPL-MCNC: 82 MG/DL (ref 65–99)
HCT VFR BLD AUTO: 43 % (ref 37.5–51)
HGB BLD-MCNC: 15 G/DL (ref 13–17.7)
IGA1 MFR SER: 94 MG/DL (ref 70–400)
IGG1 SER-MCNC: 2552 MG/DL (ref 700–1600)
IGM SERPL-MCNC: 42 MG/DL (ref 40–230)
LYMPHOCYTES # BLD AUTO: 2.13 10*3/MM3 (ref 0.7–3.1)
LYMPHOCYTES NFR BLD AUTO: 28.3 % (ref 19.6–45.3)
MCH RBC QN AUTO: 33.5 PG (ref 26.6–33)
MCHC RBC AUTO-ENTMCNC: 34.9 G/DL (ref 31.5–35.7)
MCV RBC AUTO: 96 FL (ref 79–97)
MONOCYTES # BLD AUTO: 0.51 10*3/MM3 (ref 0.1–0.9)
MONOCYTES NFR BLD AUTO: 6.8 % (ref 5–12)
NEUTROPHILS NFR BLD AUTO: 4.75 10*3/MM3 (ref 1.7–7)
NEUTROPHILS NFR BLD AUTO: 63.2 % (ref 42.7–76)
PLATELET # BLD AUTO: 187 10*3/MM3 (ref 140–450)
PMV BLD AUTO: 10.9 FL (ref 6–12)
POTASSIUM SERPL-SCNC: 3.7 MMOL/L (ref 3.5–5.2)
PROT SERPL-MCNC: 8 G/DL (ref 6–8.5)
RBC # BLD AUTO: 4.48 10*6/MM3 (ref 4.14–5.8)
SODIUM SERPL-SCNC: 140 MMOL/L (ref 136–145)
WBC NRBC COR # BLD: 7.52 10*3/MM3 (ref 3.4–10.8)

## 2021-12-03 PROCEDURE — 85025 COMPLETE CBC W/AUTO DIFF WBC: CPT

## 2021-12-03 PROCEDURE — 82784 ASSAY IGA/IGD/IGG/IGM EACH: CPT

## 2021-12-03 PROCEDURE — 80053 COMPREHEN METABOLIC PANEL: CPT

## 2021-12-03 PROCEDURE — 36415 COLL VENOUS BLD VENIPUNCTURE: CPT

## 2021-12-03 NOTE — PROGRESS NOTES
HEMATOLOGY ONCOLOGY FOLLOW UP        Patient name: Eleazar Jeong  : 1985  MRN: [id number]  Primary Care Physician: Ghislaine Wilks APRN  Referring Physician: Ghislaine Wilks APRN  Reason For Consult:     Chief Complaint   Patient presents with   • Follow-up     MGUS (monoclonal gammopathy of unknown significance)   Monoclonal gammopathy  History of polycythemia    History of Present Illness:    1. IgG kappa monoclonal gammopathy:    2018 to 2019 patient has not followed up in our office.  Patient was referred back by MARTIN Lacey due to IgG kappa monoclonal gammopathy  · 2019: Serum protein electrophoresis shows M protein 1.8 g/dL  · 2019 IgA 116, IgG 2206 high, IgM 55, serum immunofixation shows IgG kappa monoclonal gammopathy.  · 2019 creatinine 0.84, BUN 12,  high, ALT 70, iron 130, ferritin 34.3, iron saturation 38%, TIBC 346  WBC 6.7, hemoglobin 14.9, platelets 184, MCV 93.7   · 2019: Skeletal survey: No evidence of multiple myeloma.  No lytic or blastic lesions.  · 2019  SPEP M protein 1.38 free kappa light chains 37.6 high, Urine protein electrophoresis M spike 111.7, M spike to 3.38.,  24-hour urine protein is 260, urine immunofixation shows free kappa paraprotein peak and also a small IgG kappa paraprotein peak.  · 2019 calcium 9.5, creatinine 0.8, ALT 65 high, AST 57 high, hepatitis panel negative,  · 2019 Skeletal survey: No evidence of bone lytic lesions.  · 2019:UPEP shows to M spike's 4.5% and 1.3%.  24-hour urine protein to 60 high,  · 2020: WBC 9.2, hemoglobin 17.5, MCV 96.1, platelets 201  · 2020 Bone marrow aspiration and biopsy: Atypical marrow plasmacytosis 5 to 10%, consistent with plasma cell neoplasm.  Normocellular to focally mildly hypocellular marrow for age, 40 to 50%, with trilineage hematopoiesis.  No increase in reticulin fibrosis.  Storage iron present.  Flow cytometry: Clonal plasma  cell population detected 1% of analyzed cells, with aberrant expression of  and cytoplasmic kappa light chain restriction.,  Normal cytogenetics 46 XY  · 3/11/2020 IgG 2104, IgM 51, IgA 101, SPEP shows M protein 1.7  . Free kappa light chains 48.1, free lambda light chain 6.9, kappa lambda ratio 6.97 high, WBC 7.8, hemoglobin 14.9, platelets 206, MCV 93.6  · 3/11/2020:  high,  high, albumin 4.3, alk phos 56, bilirubin 0.6  · 3/18/2020 WBC 7.7, hemoglobin 16, MCV 94, platelets 202,  · 3/18/2020: IgG 2304 high, IgM 53, IgA 108, free kappa light chains 55.8 high, free lambda light chains 8.2, kappa lambda ratio 6.8 high  · 4/28/2020: Liver biopsy: Benign liver with no significant histopathology.  No evidence of malignancy.  Esophageal biopsy shows glandular mucosa with intestinal metaplasia consistent with Matson's esophagus.  Negative for dysplasia.   · 6/3/2020: CMP normal, WBC 6.9, hemoglobin 14.9, platelets 220, MCV 95.2  · 6/10/2020: WBC 8.39, hemoglobin 15.1, platelets 212, SPEP shows M protein 1.8 g/dL.,  Free kappa light chains 51.2 high, free lambda light chains 11.3, kappa lambda ratio 4.53 high, IgG 2242 high,  · 8/6/2020: WBC 8, hemoglobin 16.1, platelets 186, IgA 94, IgG 1940 high, IgM 54 low, free kappa light chains 3695 high, free lambda light chain 6.75, kappa lambda ratio 5.47 high,Calcium 9.3, AST ALT normal, albumin 4.2, TSH 0.6, total testosterone 482, SPEP shows M protein 1.55,  · 8/20/2020: CTs soft tissue neck with and without contrast: No acute process seen in the neck.  No abnormal soft tissue mass or fluid collection.  No cervical adenopathy.  · 8/21/2020: PET CT scan: No convincing hypermetabolic osseous malignancy.  Right prepatellar soft tissue thickening and fluid with diffuse mild metabolic activity suggestive of inflammatory process.    · 9/2/2020:SPEP shows M protein 1.8 g/dL, creatinine 0.94, LFTs normal, creatinine 0.94, LFTs normal, WBC 6.9, hemoglobin 16,  platelets 202, MCV 92.1  · 12/8/2020: CMP normal, hemoglobin 17, platelets 226, SPEP shows M protein 1.6, IgG 2685 high, IgA 116, IgM 69, kappa lambda ratio 5.46 high,  · 2/9/2021: WBC 7.4, hemoglobin 16.7, platelets 231, MCV 95.1, CMP normal,  · 12/3/2021: M spike 2.0, free light chain ratio 6.32, kappa free light chains 43.6, IgG up to 2552.      2.  History of polycythemia:  Patient presented to PCP Dr. Ann’s office on 4/11/16 that showed hemoglobin of 18.3.  MCV was  95.0.  He is referred to us for polycythemia.  The patient has a history of chronic anxiety and   depression.   · 4/11/16 - WBC 9.2, hemoglobin 18.3, MCV 95.0, platelet count 182,000.  Differential normal.    Vitamin B12 554.  · 5/10/2016, the patient presents for initial consultation for polycythemia.  He reports symptoms of occasional headaches.  He does have history of migraines.  He also reports some  nonspecific vision problems.  He also reports weight loss of around 30 pounds during the past six months.  He denies any fevers or any lymph node enlargement.  He also reports some night sweats.  He does feel tired all the time.  He denies having any snoring, but he lives alone.  He does report some symptoms of excessive daytime sleepiness.  He smokes about 3-4 cigars per day.  He  denies having a CBC in the past.    ·  5/10/16 - Creatinine 0.87.  LFTs normal.  WBC 7.3, hemoglobin 16.7, MCV 94.5, platelet count  189,000.  Erythropoietin level 10.6.  JAK2 V617 mutations negative.  Exon 12 and exon 13 mutation  negative.   mutation negative.    ·  6/8/16 - Bone marrow biopsy:  Hemodiluted hypocellular aspirate.  However, flow cytometry is  adequate and it does not reveal any abnormal immunophenotype.  Cytogenetics shows normal  karyotype.  The clot section showed a single bone marrow particle.  There was no evidence of  ringed sideroblasts.    · 6/30/16 - WBC 8.7, hemoglobin 17.6, MCV 94, platelet count 166,000.  · 9/1/16 - WBC 9.4,  hemoglobin 16.8, MCV 94.9, platelet count 204,000.  Patient underwent   phlebotomy.  ·  1/5/17 - Hemoglobin 16.5.  Patient underwent phlebotomy.  ·  1/9/17 - WBC 9.8, hemoglobin 15.3, platelet count 195,000.    · 5/12/17 - WBC 6.9, hemoglobin 15.9, platelet count 185,000, MCV 91.3.    · 9/15/17 - WBC 9.4, hemoglobin 16.2, platelet count 201,000, MCV 91.1.   · 12/6/17 - EGD:  Probable Matson’s esophagus. Antral gastritis.  Glandular mucosa with focal intestinal metaplasia, consistent with Matson’s.    ·  3/21/18 - Brain MRI with and without contrast:  A 2.3 cm structure along the superior margin of the cerebellum at the midline, consistent arachnoid cyst.       Subjective  Patient seen for follow-up.  No new symptoms or concerns.  Has ongoing fatigue.    The following portions of the patient's history were reviewed and updated as appropriate: allergies, current medications, past family history, past medical history, past social history, past surgical history and problem list.       Past Medical History:   Diagnosis Date   • Anxiety    • Benign monoclonal gammopathy     dr. quiroz   • Colon polyp    • Depression    • Elevated liver enzymes 04/2020   • Erectile dysfunction    • GERD (gastroesophageal reflux disease)    • Headache    • Hypoglycemia    • Left sided abdominal pain    • Low back pain    • Migraine    • Nausea    • Pneumonia 03/2019   • Tremor    • Visual impairment     wears glasses       Past Surgical History:   Procedure Laterality Date   • COLONOSCOPY     • UPPER ENDOSCOPIC ULTRASOUND W/ FNA N/A 4/28/2020    Procedure: Esophagogastroduodenoscopy and endoscopic ultrasound with biopsy of liver, biopsy of esophagus;  Surgeon: Danilo Balderrama MD;  Location: [facility] ENDOSCOPY;  Service: Gastroenterology;  Laterality: N/A;  gastritis, hiatel hernia, barretts esophagus       Current Outpatient Medications:   •  colestipol (COLESTID) 1 g tablet, Take 1 g by mouth Daily. Sometimes takes 2 per day as instructed,  Disp: , Rfl:   •  diphenhydrAMINE (BENADRYL ALLERGY) 25 MG tablet, Take 25 mg by mouth Every 6 (Six) Hours As Needed., Disp: , Rfl:   •  diphenoxylate-atropine (LOMOTIL) 2.5-0.025 MG per tablet, TAKE 1 TABLET BY MOUTH EVERY 6 HOURS AS NEEDED FOR DIARRHEA, Disp: , Rfl:   •  ibuprofen (ADVIL,MOTRIN) 800 MG tablet, Take 800 mg by mouth Every 8 (Eight) Hours. Stop now for procedure, Disp: , Rfl:   •  Loperamide HCl (IMODIUM PO), 1 dose Daily As Needed., Disp: , Rfl:   •  meclizine (ANTIVERT) 12.5 MG tablet, 1-2 tabs PO TID PRN dizziness, Disp: 60 tablet, Rfl: 1  •  montelukast (SINGULAIR) 10 MG tablet, Take 10 mg by mouth Every Night., Disp: , Rfl:   •  omeprazole (priLOSEC) 40 MG capsule, Take 40 mg by mouth Daily., Disp: , Rfl:   •  sildenafil (REVATIO) 20 MG tablet, Take 20 mg by mouth Daily As Needed. Do not use 24 hours prior to procedure, Disp: , Rfl: 99  •  sildenafil (VIAGRA) 100 MG tablet, Take 100 mg by mouth., Disp: , Rfl:     Allergies   Allergen Reactions   • Amoxicillin Unknown (See Comments)   • Erythromycin Hives   • Levofloxacin Hives   • Penicillin G Unknown (See Comments)   • Penicillins Other (See Comments)       Family History   Problem Relation Age of Onset   • Diabetes Mother    • Vision loss Mother    • COPD Paternal Aunt    • Vision loss Paternal Aunt    • Diabetes Maternal Grandmother    • Vision loss Maternal Grandmother    • Cancer Maternal Grandmother    • Heart disease Paternal Grandmother    • Stroke Paternal Grandmother    • Heart disease Paternal Grandfather    • Vision loss Paternal Grandfather    • Vision loss Father    • Vision loss Sister        Cancer-related family history includes Cancer in his maternal grandmother.    Social History     Tobacco Use   • Smoking status: Current Every Day Smoker     Packs/day: 1.00     Years: 17.00     Pack years: 17.00     Types: Cigarettes, Cigars, Electronic Cigarette     Start date: 8/2/2002   • Smokeless tobacco: Never Used   Substance Use  "Topics   • Alcohol use: Not Currently   • Drug use: Not Currently     I have reviewed the history of present illness, past medical history, family history, social history, lab results, all notes and other records since the patient was last seen on  11/27/2019.    ROS:     Objective:  Vital signs:  Vitals:    12/10/21 0914   BP: 150/86   Pulse: 70   Resp: 18   Temp: 97.3 °F (36.3 °C)   SpO2: 99%   Weight: 93 kg (205 lb)   Height: 188 cm (74\")   PainSc:   4     ECOG  (0) Fully active, able to carry on all predisease performance without restriction    Physical Exam:   Physical Exam   Constitutional: He is oriented to person, place, and time. He appears well-developed. No distress.   HENT:   Head: Normocephalic and atraumatic.   Eyes: Conjunctivae are normal. Right eye exhibits no discharge. Left eye exhibits no discharge. No scleral icterus.   Wears glasses   Neck: No thyromegaly present.   Cardiovascular: Normal rate, regular rhythm, normal heart sounds and normal pulses. Exam reveals no gallop and no friction rub.   Pulmonary/Chest: Effort normal. No stridor. No respiratory distress. He has no wheezes.   Slightly decreased air entry bilaterally   Abdominal: Soft. Normal appearance and bowel sounds are normal. He exhibits no mass. There is no abdominal tenderness. There is no rebound and no guarding.   Musculoskeletal: Normal range of motion. No tenderness.      Comments: Healing surgical scar noted on the right knee   Lymphadenopathy:     He has no cervical adenopathy.   Neurological: He is alert and oriented to person, place, and time. He exhibits normal muscle tone.   Skin: Skin is warm. No rash noted. He is not diaphoretic. No erythema.   Multiple tattoos   Psychiatric: His behavior is normal. Mood and thought content normal.   Vitals reviewed.  I have reexamined the patient and the results are consistent with the previously documented exam. Kelly Lindquist MD       Lab Results - Last 18 Months   Lab Units " 12/03/21  0835 07/12/21  0808 04/08/21  0901   WBC 10*3/mm3 7.52 8.08 5.50   HEMOGLOBIN g/dL 15.0 14.7 14.9   HEMATOCRIT % 43.0 41.8 42.1   PLATELETS 10*3/mm3 187 197 188   MCV fL 96.0 95.7 92.5     Lab Results - Last 18 Months   Lab Units 12/03/21  0835 07/12/21  0808 04/08/21  0901   SODIUM mmol/L 140 139 136   POTASSIUM mmol/L 3.7 3.8 3.5   CHLORIDE mmol/L 103 101 103   CO2 mmol/L 24.0 27.0 24.0   BUN mg/dL 11 8 11   CREATININE mg/dL 0.95 0.95 0.95   CALCIUM mg/dL 9.0 9.1 8.8   BILIRUBIN mg/dL 0.6 0.6 0.6   ALK PHOS U/L 62 68 65   ALT (SGPT) U/L 39 23 47*   AST (SGOT) U/L 23 19 35   GLUCOSE mg/dL 82 89 111*       Lab Results   Component Value Date    GLUCOSE 82 12/03/2021    BUN 11 12/03/2021    CREATININE 0.95 12/03/2021    EGFRIFNONA 90 12/03/2021    BCR 11.6 12/03/2021    K 3.7 12/03/2021    CO2 24.0 12/03/2021    CALCIUM 9.0 12/03/2021    PROTENTOTREF 8.1 12/03/2021    ALBUMIN 4.40 12/03/2021    ALBUMIN 4.2 12/03/2021    LABIL2 1.1 12/03/2021    AST 23 12/03/2021    ALT 39 12/03/2021       No results for input(s): APTT, INR, PTT in the last 36334 hours.    Lab Results   Component Value Date    IRON 130 12/13/2019    TIBC 346 12/13/2019    FERRITIN 34.35 12/13/2019       No results found for: FOLATE    No results found for: OCCULTBLD    No results found for: RETICCTPCT    Lab Results   Component Value Date    ATWZBHDG26 319 06/13/2019     Lab Results   Component Value Date    SPEP Comment 12/03/2021     No results found for: LDH, URICACID  Lab Results   Component Value Date    SEDRATE 3 11/05/2019     No results found for: FIBRINOGEN, HAPTOGLOBIN  Lab Results   Component Value Date    PTT 24.9 01/17/2020    INR 1.04 01/17/2020     No results found for:   No results found for: CEA  No components found for: CA-19-9  No results found for: PSA      Assessment/Plan     Assessment:  1.  High risk IgG kappa MGUS: Diagnosed November 2019.  High-intermediate risk.  PET CT scan on 8/21/2020 does not show any  evidence of bone lesions..  No crab symptoms..  M protein was 1.8 initially,   Skeletal survey was negative.  He does not report any frequent infections..  Bone marrow biopsy shows about 5 to 10% plasma cells.  Cytogenetics showed normal karyotype.  They were unable to run the FISH panel.  Urine immunofixation is also positive.  M protein remains stable as of July 2021.  No longer M protein is slightly increased to 2.0.  CMP still unremarkable no elevated creatinine, no anemia no elevated calcium.  IgG has also slightly increased to 2500.  Patient is to continue follow-up with  hematology as well.  2. History of elevated liver enzymes: ALT is up to 193 and AST up to 602 as of 3/11/2020.  Patient is following with gastroenterology.  .  Status post liver biopsy which did not reveal any pathology.  Repeat LFTs have normalized.  Continue stay normal with recent check.  3. History of secondary polycythemia: Seems resolved. Bone marrow biopsy was unremarkable.  JAK2 mutation and EPO  level do not suggest any evidence of polycythemia vera.  He has secondary polycythemia likely due  to tobacco use and from sleep apnea.  The patient continues to smoke.  Patient did not want to  have a sleep study.  He has not required phlebotomy for the last 3-4 years His hemoglobin has stabilized.   4. History of Matson’s esophagus.  He follows up with Dr. Hernandez.  Will need surveillance endoscopies.  5. History of tobacco use: Smoking cessation counseling previously has been done.  6. Hypoglycemic episodes       PLAN:  1. We will continue to monitor SPEP, free light chain assay, CBC CMP.  Repeat labs in 4 months.  Slight elevation seen in IgG and M protein.  2. Continue follow-up with gastroenterology for Matson's esophagus, diarrhea, weight loss   3. Will need repeat endoscopies for his Matson's esophagus.  He has this every 1 to 2 years  4. We will follow patient back in 4 months with repeat myeloma labs.        MGUS (monoclonal  gammopathy of unknown significance)  - CBC & Differential  - CBC & Differential  - Comprehensive Metabolic Panel  - Protein Elec + Interp, Serum  - Immunoglobulin Free LT Chains Blood    Orders Placed This Encounter   Procedures   • Comprehensive Metabolic Panel     Standing Status:   Future     Standing Expiration Date:   12/10/2022     Order Specific Question:   Release to patient     Answer:   Immediate   • Protein Elec + Interp, Serum     Standing Status:   Future     Standing Expiration Date:   12/10/2022     Scheduling Instructions:      Please draw these labs prior to the next visit.     Order Specific Question:   Release to patient     Answer:   Immediate   • Immunoglobulin Free LT Chains Blood     Standing Status:   Future     Standing Expiration Date:   12/10/2022     Order Specific Question:   Release to patient     Answer:   Immediate   • CBC & Differential   • CBC & Differential     Standing Status:   Future     Standing Expiration Date:   12/10/2022      Patient is new to me.  I have reviewed and confirmed the accuracy of the patient's history: Chief complaint, HPI, ROS and Subjective as entered by the MA/LPN/RN.     Kelly Lindquist MD 12/10/21

## 2021-12-06 LAB
ALBUMIN SERPL ELPH-MCNC: 4.2 G/DL (ref 2.9–4.4)
ALBUMIN/GLOB SERPL: 1.1 {RATIO} (ref 0.7–1.7)
ALPHA1 GLOB SERPL ELPH-MCNC: 0.2 G/DL (ref 0–0.4)
ALPHA2 GLOB SERPL ELPH-MCNC: 0.6 G/DL (ref 0.4–1)
B-GLOBULIN SERPL ELPH-MCNC: 0.9 G/DL (ref 0.7–1.3)
GAMMA GLOB SERPL ELPH-MCNC: 2.2 G/DL (ref 0.4–1.8)
GLOBULIN SER CALC-MCNC: 3.9 G/DL (ref 2.2–3.9)
KAPPA LC FREE SER-MCNC: 43.6 MG/L (ref 3.3–19.4)
KAPPA LC FREE/LAMBDA FREE SER: 6.32 {RATIO} (ref 0.26–1.65)
LABORATORY COMMENT REPORT: ABNORMAL
LAMBDA LC FREE SERPL-MCNC: 6.9 MG/L (ref 5.7–26.3)
M PROTEIN SERPL ELPH-MCNC: 2 G/DL
PROT PATTERN SERPL ELPH-IMP: ABNORMAL
PROT SERPL-MCNC: 8.1 G/DL (ref 6–8.5)
REF LAB TEST METHOD: ABNORMAL

## 2021-12-10 ENCOUNTER — APPOINTMENT (OUTPATIENT)
Dept: LAB | Facility: HOSPITAL | Age: 36
End: 2021-12-10

## 2021-12-10 ENCOUNTER — OFFICE VISIT (OUTPATIENT)
Dept: ONCOLOGY | Facility: CLINIC | Age: 36
End: 2021-12-10

## 2021-12-10 VITALS
HEART RATE: 70 BPM | TEMPERATURE: 97.3 F | HEIGHT: 74 IN | RESPIRATION RATE: 18 BRPM | OXYGEN SATURATION: 99 % | DIASTOLIC BLOOD PRESSURE: 86 MMHG | SYSTOLIC BLOOD PRESSURE: 150 MMHG | BODY MASS INDEX: 26.31 KG/M2 | WEIGHT: 205 LBS

## 2021-12-10 DIAGNOSIS — D47.2 MGUS (MONOCLONAL GAMMOPATHY OF UNKNOWN SIGNIFICANCE): Primary | ICD-10-CM

## 2021-12-10 PROCEDURE — 99214 OFFICE O/P EST MOD 30 MIN: CPT | Performed by: INTERNAL MEDICINE

## 2022-01-17 ENCOUNTER — ON CAMPUS - OUTPATIENT (AMBULATORY)
Dept: URBAN - METROPOLITAN AREA HOSPITAL 77 | Facility: HOSPITAL | Age: 37
End: 2022-01-17

## 2022-01-17 DIAGNOSIS — K63.5 POLYP OF COLON: ICD-10-CM

## 2022-01-17 DIAGNOSIS — K92.1 MELENA: ICD-10-CM

## 2022-01-17 DIAGNOSIS — K31.89 OTHER DISEASES OF STOMACH AND DUODENUM: ICD-10-CM

## 2022-01-17 DIAGNOSIS — R19.7 DIARRHEA, UNSPECIFIED: ICD-10-CM

## 2022-01-17 DIAGNOSIS — K22.70 BARRETT'S ESOPHAGUS WITHOUT DYSPLASIA: ICD-10-CM

## 2022-01-17 PROCEDURE — 43239 EGD BIOPSY SINGLE/MULTIPLE: CPT | Performed by: INTERNAL MEDICINE

## 2022-01-17 PROCEDURE — 45380 COLONOSCOPY AND BIOPSY: CPT | Performed by: INTERNAL MEDICINE

## 2022-03-07 ENCOUNTER — OFFICE (AMBULATORY)
Dept: URBAN - METROPOLITAN AREA CLINIC 64 | Facility: CLINIC | Age: 37
End: 2022-03-07

## 2022-03-07 VITALS
WEIGHT: 230 LBS | HEART RATE: 73 BPM | DIASTOLIC BLOOD PRESSURE: 99 MMHG | SYSTOLIC BLOOD PRESSURE: 141 MMHG | HEIGHT: 74 IN

## 2022-03-07 DIAGNOSIS — R19.7 DIARRHEA, UNSPECIFIED: ICD-10-CM

## 2022-03-07 DIAGNOSIS — R94.5 ABNORMAL RESULTS OF LIVER FUNCTION STUDIES: ICD-10-CM

## 2022-03-07 DIAGNOSIS — K92.1 MELENA: ICD-10-CM

## 2022-03-07 PROCEDURE — 99214 OFFICE O/P EST MOD 30 MIN: CPT | Performed by: INTERNAL MEDICINE

## 2022-03-07 RX ORDER — ELUXADOLINE 100 MG/1
TABLET, FILM COATED ORAL
Qty: 180 | Refills: 3 | Status: COMPLETED
Start: 2022-03-07 | End: 2022-06-27

## 2022-03-07 RX ORDER — ESOMEPRAZOLE MAGNESIUM 40 MG/1
80 CAPSULE, DELAYED RELEASE ORAL
Qty: 180 | Refills: 3 | Status: COMPLETED
Start: 2022-03-07 | End: 2022-06-27

## 2022-03-08 ENCOUNTER — ON CAMPUS - OUTPATIENT (AMBULATORY)
Dept: URBAN - METROPOLITAN AREA HOSPITAL 77 | Facility: HOSPITAL | Age: 37
End: 2022-03-08

## 2022-03-08 DIAGNOSIS — K22.70 BARRETT'S ESOPHAGUS WITHOUT DYSPLASIA: ICD-10-CM

## 2022-03-08 PROCEDURE — 43239 EGD BIOPSY SINGLE/MULTIPLE: CPT | Performed by: INTERNAL MEDICINE

## 2022-04-08 NOTE — PROGRESS NOTES
HEMATOLOGY ONCOLOGY FOLLOW UP        Patient name: Eleazar Jeong  : 1985  MRN: [id number]  Primary Care Physician: Ghislaine Wilks APRN  Referring Physician: Ghislaine Wilks APRN  Reason For Consult:     Chief Complaint   Patient presents with   • Follow-up     MGUS (monoclonal gammopathy of unknown significance)   Monoclonal gammopathy  History of polycythemia    History of Present Illness:    1. IgG kappa monoclonal gammopathy:    2018 to 2019 patient has not followed up in our office.  Patient was referred back by MARTIN Lacey due to IgG kappa monoclonal gammopathy  · 2019: Serum protein electrophoresis shows M protein 1.8 g/dL  · 2019 IgA 116, IgG 2206 high, IgM 55, serum immunofixation shows IgG kappa monoclonal gammopathy.  · 2019 creatinine 0.84, BUN 12,  high, ALT 70, iron 130, ferritin 34.3, iron saturation 38%, TIBC 346  WBC 6.7, hemoglobin 14.9, platelets 184, MCV 93.7   · 2019: Skeletal survey: No evidence of multiple myeloma.  No lytic or blastic lesions.  · 2019  SPEP M protein 1.38 free kappa light chains 37.6 high, Urine protein electrophoresis M spike 111.7, M spike to 3.38.,  24-hour urine protein is 260, urine immunofixation shows free kappa paraprotein peak and also a small IgG kappa paraprotein peak.  · 2019 calcium 9.5, creatinine 0.8, ALT 65 high, AST 57 high, hepatitis panel negative,  · 2019 Skeletal survey: No evidence of bone lytic lesions.  · 2019:UPEP shows to M spike's 4.5% and 1.3%.  24-hour urine protein to 60 high,  · 2020: WBC 9.2, hemoglobin 17.5, MCV 96.1, platelets 201  · 2020 Bone marrow aspiration and biopsy: Atypical marrow plasmacytosis 5 to 10%, consistent with plasma cell neoplasm.  Normocellular to focally mildly hypocellular marrow for age, 40 to 50%, with trilineage hematopoiesis.  No increase in reticulin fibrosis.  Storage iron present.  Flow cytometry: Clonal plasma  cell population detected 1% of analyzed cells, with aberrant expression of  and cytoplasmic kappa light chain restriction.,  Normal cytogenetics 46 XY  · 3/11/2020 IgG 2104, IgM 51, IgA 101, SPEP shows M protein 1.7  . Free kappa light chains 48.1, free lambda light chain 6.9, kappa lambda ratio 6.97 high, WBC 7.8, hemoglobin 14.9, platelets 206, MCV 93.6  · 3/11/2020:  high,  high, albumin 4.3, alk phos 56, bilirubin 0.6  · 3/18/2020 WBC 7.7, hemoglobin 16, MCV 94, platelets 202,  · 3/18/2020: IgG 2304 high, IgM 53, IgA 108, free kappa light chains 55.8 high, free lambda light chains 8.2, kappa lambda ratio 6.8 high  · 4/28/2020: Liver biopsy: Benign liver with no significant histopathology.  No evidence of malignancy.  Esophageal biopsy shows glandular mucosa with intestinal metaplasia consistent with Matson's esophagus.  Negative for dysplasia.   · 6/3/2020: CMP normal, WBC 6.9, hemoglobin 14.9, platelets 220, MCV 95.2  · 6/10/2020: WBC 8.39, hemoglobin 15.1, platelets 212, SPEP shows M protein 1.8 g/dL.,  Free kappa light chains 51.2 high, free lambda light chains 11.3, kappa lambda ratio 4.53 high, IgG 2242 high,  · 8/6/2020: WBC 8, hemoglobin 16.1, platelets 186, IgA 94, IgG 1940 high, IgM 54 low, free kappa light chains 3695 high, free lambda light chain 6.75, kappa lambda ratio 5.47 high,Calcium 9.3, AST ALT normal, albumin 4.2, TSH 0.6, total testosterone 482, SPEP shows M protein 1.55,  · 8/20/2020: CTs soft tissue neck with and without contrast: No acute process seen in the neck.  No abnormal soft tissue mass or fluid collection.  No cervical adenopathy.  · 8/21/2020: PET CT scan: No convincing hypermetabolic osseous malignancy.  Right prepatellar soft tissue thickening and fluid with diffuse mild metabolic activity suggestive of inflammatory process.    · 9/2/2020:SPEP shows M protein 1.8 g/dL, creatinine 0.94, LFTs normal, creatinine 0.94, LFTs normal, WBC 6.9, hemoglobin 16,  platelets 202, MCV 92.1  · 12/8/2020: CMP normal, hemoglobin 17, platelets 226, SPEP shows M protein 1.6, IgG 2685 high, IgA 116, IgM 69, kappa lambda ratio 5.46 high,  · 2/9/2021: WBC 7.4, hemoglobin 16.7, platelets 231, MCV 95.1, CMP normal,  · 12/3/2021: M spike 2.0, free light chain ratio 6.32, kappa free light chains 43.6, IgG up to 2552.      2.  History of polycythemia:  Patient presented to PCP Dr. Ann’s office on 4/11/16 that showed hemoglobin of 18.3.  MCV was  95.0.  He is referred to us for polycythemia.  The patient has a history of chronic anxiety and   depression.   · 4/11/16 - WBC 9.2, hemoglobin 18.3, MCV 95.0, platelet count 182,000.  Differential normal.    Vitamin B12 554.  · 5/10/2016, the patient presents for initial consultation for polycythemia.  He reports symptoms of occasional headaches.  He does have history of migraines.  He also reports some  nonspecific vision problems.  He also reports weight loss of around 30 pounds during the past six months.  He denies any fevers or any lymph node enlargement.  He also reports some night sweats.  He does feel tired all the time.  He denies having any snoring, but he lives alone.  He does report some symptoms of excessive daytime sleepiness.  He smokes about 3-4 cigars per day.  He  denies having a CBC in the past.    ·  5/10/16 - Creatinine 0.87.  LFTs normal.  WBC 7.3, hemoglobin 16.7, MCV 94.5, platelet count  189,000.  Erythropoietin level 10.6.  JAK2 V617 mutations negative.  Exon 12 and exon 13 mutation  negative.   mutation negative.    ·  6/8/16 - Bone marrow biopsy:  Hemodiluted hypocellular aspirate.  However, flow cytometry is  adequate and it does not reveal any abnormal immunophenotype.  Cytogenetics shows normal  karyotype.  The clot section showed a single bone marrow particle.  There was no evidence of  ringed sideroblasts.    · 6/30/16 - WBC 8.7, hemoglobin 17.6, MCV 94, platelet count 166,000.  · 9/1/16 - WBC 9.4,  hemoglobin 16.8, MCV 94.9, platelet count 204,000.  Patient underwent   phlebotomy.  ·  1/5/17 - Hemoglobin 16.5.  Patient underwent phlebotomy.  ·  1/9/17 - WBC 9.8, hemoglobin 15.3, platelet count 195,000.    · 5/12/17 - WBC 6.9, hemoglobin 15.9, platelet count 185,000, MCV 91.3.    · 9/15/17 - WBC 9.4, hemoglobin 16.2, platelet count 201,000, MCV 91.1.   · 12/6/17 - EGD:  Probable Matson’s esophagus. Antral gastritis.  Glandular mucosa with focal intestinal metaplasia, consistent with Matson’s.    ·  3/21/18 - Brain MRI with and without contrast:  A 2.3 cm structure along the superior margin of the cerebellum at the midline, consistent arachnoid cyst.       Subjective  Patient seen for follow-up.  He continues to have fatigue, no other new symptoms.  His weight fluctuates.  No night sweats.    The following portions of the patient's history were reviewed and updated as appropriate: allergies, current medications, past family history, past medical history, past social history, past surgical history and problem list.       Past Medical History:   Diagnosis Date   • Anxiety    • Benign monoclonal gammopathy     dr. quiroz   • Colon polyp    • Depression    • Elevated liver enzymes 04/2020   • Erectile dysfunction    • GERD (gastroesophageal reflux disease)    • Headache    • Hypoglycemia    • Left sided abdominal pain    • Low back pain    • Migraine    • Nausea    • Pneumonia 03/2019   • Tremor    • Visual impairment     wears glasses       Past Surgical History:   Procedure Laterality Date   • COLONOSCOPY     • UPPER ENDOSCOPIC ULTRASOUND W/ FNA N/A 4/28/2020    Procedure: Esophagogastroduodenoscopy and endoscopic ultrasound with biopsy of liver, biopsy of esophagus;  Surgeon: Danilo Balderrama MD;  Location: [facility] ENDOSCOPY;  Service: Gastroenterology;  Laterality: N/A;  gastritis, hiatel hernia, barretts esophagus       Current Outpatient Medications:   •  colestipol (COLESTID) 1 g tablet, Take 1 g by mouth  Daily. Sometimes takes 2 per day as instructed, Disp: , Rfl:   •  diphenhydrAMINE (BENADRYL) 25 MG tablet, Take 25 mg by mouth Every 6 (Six) Hours As Needed., Disp: , Rfl:   •  diphenoxylate-atropine (LOMOTIL) 2.5-0.025 MG per tablet, TAKE 1 TABLET BY MOUTH EVERY 6 HOURS AS NEEDED FOR DIARRHEA, Disp: , Rfl:   •  ibuprofen (ADVIL,MOTRIN) 800 MG tablet, Take 800 mg by mouth Every 8 (Eight) Hours. Stop now for procedure, Disp: , Rfl:   •  Loperamide HCl (IMODIUM PO), 1 dose Daily As Needed., Disp: , Rfl:   •  meclizine (ANTIVERT) 12.5 MG tablet, 1-2 tabs PO TID PRN dizziness, Disp: 60 tablet, Rfl: 1  •  montelukast (SINGULAIR) 10 MG tablet, Take 10 mg by mouth Every Night., Disp: , Rfl:   •  omeprazole (priLOSEC) 40 MG capsule, Take 40 mg by mouth Daily., Disp: , Rfl:   •  pantoprazole (PROTONIX) 40 MG EC tablet, Take 40 mg by mouth 2 (Two) Times a Day., Disp: , Rfl:   •  sildenafil (REVATIO) 20 MG tablet, Take 20 mg by mouth Daily As Needed. Do not use 24 hours prior to procedure, Disp: , Rfl: 99  •  sildenafil (VIAGRA) 100 MG tablet, Take 100 mg by mouth., Disp: , Rfl:   •  Viberzi 100 MG tablet, Take 1 tablet by mouth 2 (Two) Times a Day., Disp: , Rfl:     Allergies   Allergen Reactions   • Amoxicillin Unknown (See Comments)   • Erythromycin Hives   • Levofloxacin Hives   • Penicillin G Unknown (See Comments)   • Penicillins Other (See Comments)       Family History   Problem Relation Age of Onset   • Diabetes Mother    • Vision loss Mother    • COPD Paternal Aunt    • Vision loss Paternal Aunt    • Diabetes Maternal Grandmother    • Vision loss Maternal Grandmother    • Cancer Maternal Grandmother    • Heart disease Paternal Grandmother    • Stroke Paternal Grandmother    • Heart disease Paternal Grandfather    • Vision loss Paternal Grandfather    • Vision loss Father    • Vision loss Sister        Cancer-related family history includes Cancer in his maternal grandmother.    Social History     Tobacco Use   •  "Smoking status: Current Every Day Smoker     Packs/day: 1.00     Years: 17.00     Pack years: 17.00     Types: Cigarettes, Cigars, Electronic Cigarette     Start date: 8/2/2002   • Smokeless tobacco: Never Used   Substance Use Topics   • Alcohol use: Not Currently   • Drug use: Not Currently     I have reviewed the history of present illness, past medical history, family history, social history, lab results, all notes and other records since the patient was last seen on  11/27/2019.    ROS:     Objective:  Vital signs:  Vitals:    04/11/22 0914   BP: 123/77   Pulse: 81   Resp: 18   Temp: 96.9 °F (36.1 °C)   SpO2: 99%   Weight: 105 kg (230 lb 9.6 oz)   Height: 188 cm (74\")   PainSc:   4   PainLoc: Generalized     ECOG  (0) Fully active, able to carry on all predisease performance without restriction    Physical Exam:   Physical Exam   Constitutional: He is oriented to person, place, and time. He appears well-developed. No distress.   HENT:   Head: Normocephalic and atraumatic.   Eyes: Conjunctivae are normal.   Wears glasses   Neck: No thyromegaly present.   Cardiovascular: Normal rate, regular rhythm, normal heart sounds and normal pulses. Exam reveals no gallop and no friction rub.   Pulmonary/Chest: Effort normal. No stridor. No respiratory distress. He has no wheezes.   Slightly decreased air entry bilaterally   Abdominal: Soft. Normal appearance and bowel sounds are normal. He exhibits no mass.   Musculoskeletal: Normal range of motion. No tenderness.      Comments: Healing surgical scar noted on the right knee   Lymphadenopathy:     He has no cervical adenopathy.   Neurological: He is alert and oriented to person, place, and time. He exhibits normal muscle tone.   Skin: Skin is warm. No rash noted. He is not diaphoretic. No erythema.   Multiple tattoos   Psychiatric: His behavior is normal. Mood normal.   Vitals reviewed.  I have reexamined the patient and the results are consistent with the previously " documented exam. Kelly Lindquist MD       Lab Results - Last 18 Months   Lab Units 04/11/22  0908 12/03/21  0835 07/12/21  0808   WBC 10*3/mm3 7.22 7.52 8.08   HEMOGLOBIN g/dL 16.0 15.0 14.7   HEMATOCRIT % 45.4 43.0 41.8   PLATELETS 10*3/mm3 197 187 197   MCV fL 95.6 96.0 95.7     Lab Results - Last 18 Months   Lab Units 12/03/21  0835 07/12/21  0808 04/08/21  0901   SODIUM mmol/L 140 139 136   POTASSIUM mmol/L 3.7 3.8 3.5   CHLORIDE mmol/L 103 101 103   CO2 mmol/L 24.0 27.0 24.0   BUN mg/dL 11 8 11   CREATININE mg/dL 0.95 0.95 0.95   CALCIUM mg/dL 9.0 9.1 8.8   BILIRUBIN mg/dL 0.6 0.6 0.6   ALK PHOS U/L 62 68 65   ALT (SGPT) U/L 39 23 47*   AST (SGOT) U/L 23 19 35   GLUCOSE mg/dL 82 89 111*       Lab Results   Component Value Date    GLUCOSE 82 12/03/2021    BUN 11 12/03/2021    CREATININE 0.95 12/03/2021    EGFRIFNONA 90 12/03/2021    BCR 11.6 12/03/2021    K 3.7 12/03/2021    CO2 24.0 12/03/2021    CALCIUM 9.0 12/03/2021    PROTENTOTREF 8.1 12/03/2021    ALBUMIN 4.40 12/03/2021    ALBUMIN 4.2 12/03/2021    LABIL2 1.1 12/03/2021    AST 23 12/03/2021    ALT 39 12/03/2021       No results for input(s): APTT, INR, PTT in the last 98554 hours.    Lab Results   Component Value Date    IRON 130 12/13/2019    TIBC 346 12/13/2019    FERRITIN 34.35 12/13/2019       No results found for: FOLATE    No results found for: OCCULTBLD    No results found for: RETICCTPCT    Lab Results   Component Value Date    ZDQSFDGR94 319 06/13/2019     Lab Results   Component Value Date    SPEP Comment 12/03/2021     No results found for: LDH, URICACID  Lab Results   Component Value Date    SEDRATE 3 11/05/2019     No results found for: FIBRINOGEN, HAPTOGLOBIN  Lab Results   Component Value Date    PTT 24.9 01/17/2020    INR 1.04 01/17/2020     No results found for:   No results found for: CEA  No components found for: CA-19-9  No results found for: PSA      Assessment/Plan     Assessment:  1.  High risk IgG kappa MGUS: Diagnosed November  2019.  High-intermediate risk.  PET CT scan on 8/21/2020 does not show any evidence of bone lesions..  No crab symptoms.  M protein was 1.8 initially,   Skeletal survey was negative.  He does not report any frequent infections..  Bone marrow biopsy shows about 5 to 10% plasma cells.  Cytogenetics showed normal karyotype.  They were unable to run the FISH panel.  Urine immunofixation is also positive.  M protein remains stable as of July 2021.  No longer M protein is slightly increased to 2.0.  CMP still unremarkable no elevated creatinine, no anemia no elevated calcium.  IgG has also slightly increased to 2500.  Patient is to continue follow-up with  hematology as well.  I will repeat SPEP, free light chain today.  If there is a continued increase, will recommend getting a bone marrow biopsy.  He gets this at  hematology.  2. History of elevated liver enzymes: ALT is up to 193 and AST up to 602 as of 3/11/2020.  Patient is following with gastroenterology.  .  Status post liver biopsy which did not reveal any pathology.  Repeat LFTs have normalized.  Continue stay normal with recent check.  3. History of secondary polycythemia: Seems resolved. Bone marrow biopsy was unremarkable.  JAK2 mutation and EPO  level do not suggest any evidence of polycythemia vera.  He has secondary polycythemia likely due  to tobacco use and from sleep apnea.  The patient continues to smoke.  Patient did not want to  have a sleep study.  He has not required phlebotomy for the last 3-4 years His hemoglobin has stabilized.   4. Fatigue: I will check for TSH, cortisol levels as.  5. History of Matson’s esophagus.  He follows up with Dr. Hernandez.  Will need surveillance endoscopies.    6. History of tobacco use: Smoking cessation counseling previously has been done.  7. Hypoglycemic episodes       PLAN:  1. We will continue to monitor SPEP, free light chain assay, CBC CMP.  Follow-up levels from today consider repeating in 4 months if no  change significantly.  If increased recommend bone marrow biopsy again.  2. Continue follow-up with gastroenterology for Matson's esophagus, diarrhea, weight loss   3. Will need repeat endoscopies for his Matson's esophagus.  He has this every 1 to 2 years  4. We will follow patient back in 4 months with repeat myeloma labs.  5. Patient has had some symptoms of depression which she knows to not commit to her diagnosis at this point his symptoms of severe fatigue.  Discussed behavioral health, seeking further help patient not interested at the moment.  No active suicidal ideation.        MGUS (monoclonal gammopathy of unknown significance)  - Protein Elec + Interp, Serum  - Immunofixation electrophoresis  - Immunoglobulin Free LT Chains Blood  - CBC & Differential  - Comprehensive Metabolic Panel  - Cortisol  - TSH    Orders Placed This Encounter   Procedures   • Protein Elec + Interp, Serum     Standing Status:   Future     Standing Expiration Date:   4/11/2023     Scheduling Instructions:      Please draw these labs prior to the next visit.     Order Specific Question:   Release to patient     Answer:   Immediate   • Immunofixation electrophoresis     Standing Status:   Future     Standing Expiration Date:   4/11/2023     Order Specific Question:   Release to patient     Answer:   Immediate   • Immunoglobulin Free LT Chains Blood     Standing Status:   Future     Standing Expiration Date:   4/11/2023   • Comprehensive Metabolic Panel     Standing Status:   Future     Standing Expiration Date:   4/11/2023     Order Specific Question:   Release to patient     Answer:   Immediate   • Cortisol     Order Specific Question:   Release to patient     Answer:   Immediate   • TSH     Order Specific Question:   Release to patient     Answer:   Immediate   • CBC & Differential     Standing Status:   Future     Standing Expiration Date:   4/11/2023      Patient is new to me.  I have reviewed and confirmed the accuracy of the  patient's history: Chief complaint, HPI, ROS and Subjective as entered by the MA/LPN/RN.     Kelly Lindquist MD 04/11/22

## 2022-04-11 ENCOUNTER — OFFICE VISIT (OUTPATIENT)
Dept: ONCOLOGY | Facility: CLINIC | Age: 37
End: 2022-04-11

## 2022-04-11 ENCOUNTER — LAB (OUTPATIENT)
Dept: LAB | Facility: HOSPITAL | Age: 37
End: 2022-04-11

## 2022-04-11 VITALS
HEIGHT: 74 IN | HEART RATE: 81 BPM | OXYGEN SATURATION: 99 % | RESPIRATION RATE: 18 BRPM | SYSTOLIC BLOOD PRESSURE: 123 MMHG | WEIGHT: 230.6 LBS | TEMPERATURE: 96.9 F | DIASTOLIC BLOOD PRESSURE: 77 MMHG | BODY MASS INDEX: 29.59 KG/M2

## 2022-04-11 DIAGNOSIS — D47.2 MGUS (MONOCLONAL GAMMOPATHY OF UNKNOWN SIGNIFICANCE): Primary | ICD-10-CM

## 2022-04-11 DIAGNOSIS — D47.2 MGUS (MONOCLONAL GAMMOPATHY OF UNKNOWN SIGNIFICANCE): ICD-10-CM

## 2022-04-11 LAB
ALBUMIN SERPL-MCNC: 4.3 G/DL (ref 3.5–5.2)
ALBUMIN/GLOB SERPL: 1.2 G/DL
ALP SERPL-CCNC: 62 U/L (ref 39–117)
ALT SERPL W P-5'-P-CCNC: 42 U/L (ref 1–41)
ANION GAP SERPL CALCULATED.3IONS-SCNC: 10 MMOL/L (ref 5–15)
AST SERPL-CCNC: 23 U/L (ref 1–40)
BASOPHILS # BLD AUTO: 0.04 10*3/MM3 (ref 0–0.2)
BASOPHILS NFR BLD AUTO: 0.6 % (ref 0–1.5)
BILIRUB SERPL-MCNC: 0.8 MG/DL (ref 0–1.2)
BUN SERPL-MCNC: 8 MG/DL (ref 6–20)
BUN/CREAT SERPL: 8.6 (ref 7–25)
CALCIUM SPEC-SCNC: 9.3 MG/DL (ref 8.6–10.5)
CHLORIDE SERPL-SCNC: 103 MMOL/L (ref 98–107)
CO2 SERPL-SCNC: 25 MMOL/L (ref 22–29)
CORTIS SERPL-MCNC: 7.63 MCG/DL
CREAT SERPL-MCNC: 0.93 MG/DL (ref 0.76–1.27)
DEPRECATED RDW RBC AUTO: 44 FL (ref 37–54)
EGFRCR SERPLBLD CKD-EPI 2021: 108.5 ML/MIN/1.73
EOSINOPHIL # BLD AUTO: 0.13 10*3/MM3 (ref 0–0.4)
EOSINOPHIL NFR BLD AUTO: 1.8 % (ref 0.3–6.2)
ERYTHROCYTE [DISTWIDTH] IN BLOOD BY AUTOMATED COUNT: 13 % (ref 12.3–15.4)
GLOBULIN UR ELPH-MCNC: 3.7 GM/DL
GLUCOSE SERPL-MCNC: 97 MG/DL (ref 65–99)
HCT VFR BLD AUTO: 45.4 % (ref 37.5–51)
HGB BLD-MCNC: 16 G/DL (ref 13–17.7)
LYMPHOCYTES # BLD AUTO: 1.88 10*3/MM3 (ref 0.7–3.1)
LYMPHOCYTES NFR BLD AUTO: 26 % (ref 19.6–45.3)
MCH RBC QN AUTO: 33.7 PG (ref 26.6–33)
MCHC RBC AUTO-ENTMCNC: 35.2 G/DL (ref 31.5–35.7)
MCV RBC AUTO: 95.6 FL (ref 79–97)
MONOCYTES # BLD AUTO: 0.69 10*3/MM3 (ref 0.1–0.9)
MONOCYTES NFR BLD AUTO: 9.6 % (ref 5–12)
NEUTROPHILS NFR BLD AUTO: 4.48 10*3/MM3 (ref 1.7–7)
NEUTROPHILS NFR BLD AUTO: 62 % (ref 42.7–76)
PLATELET # BLD AUTO: 197 10*3/MM3 (ref 140–450)
PMV BLD AUTO: 10.7 FL (ref 6–12)
POTASSIUM SERPL-SCNC: 3.6 MMOL/L (ref 3.5–5.2)
PROT SERPL-MCNC: 8 G/DL (ref 6–8.5)
RBC # BLD AUTO: 4.75 10*6/MM3 (ref 4.14–5.8)
SODIUM SERPL-SCNC: 138 MMOL/L (ref 136–145)
TSH SERPL DL<=0.05 MIU/L-ACNC: 0.91 UIU/ML (ref 0.27–4.2)
WBC NRBC COR # BLD: 7.22 10*3/MM3 (ref 3.4–10.8)

## 2022-04-11 PROCEDURE — 82533 TOTAL CORTISOL: CPT | Performed by: INTERNAL MEDICINE

## 2022-04-11 PROCEDURE — 36415 COLL VENOUS BLD VENIPUNCTURE: CPT

## 2022-04-11 PROCEDURE — 99214 OFFICE O/P EST MOD 30 MIN: CPT | Performed by: INTERNAL MEDICINE

## 2022-04-11 PROCEDURE — 80050 GENERAL HEALTH PANEL: CPT | Performed by: INTERNAL MEDICINE

## 2022-04-11 RX ORDER — PANTOPRAZOLE SODIUM 40 MG/1
40 TABLET, DELAYED RELEASE ORAL 2 TIMES DAILY
COMMUNITY
Start: 2022-03-29

## 2022-04-11 RX ORDER — ELUXADOLINE 100 MG/1
1 TABLET, FILM COATED ORAL 2 TIMES DAILY
COMMUNITY
Start: 2022-03-29

## 2022-04-12 LAB
ALBUMIN SERPL ELPH-MCNC: 3.7 G/DL (ref 2.9–4.4)
ALBUMIN/GLOB SERPL: 0.9 {RATIO} (ref 0.7–1.7)
ALPHA1 GLOB SERPL ELPH-MCNC: 0.3 G/DL (ref 0–0.4)
ALPHA2 GLOB SERPL ELPH-MCNC: 0.7 G/DL (ref 0.4–1)
B-GLOBULIN SERPL ELPH-MCNC: 1 G/DL (ref 0.7–1.3)
GAMMA GLOB SERPL ELPH-MCNC: 2.1 G/DL (ref 0.4–1.8)
GLOBULIN SER CALC-MCNC: 4 G/DL (ref 2.2–3.9)
LABORATORY COMMENT REPORT: ABNORMAL
M PROTEIN SERPL ELPH-MCNC: 1.8 G/DL
PROT PATTERN SERPL ELPH-IMP: ABNORMAL
PROT SERPL-MCNC: 7.7 G/DL (ref 6–8.5)

## 2022-04-13 LAB
KAPPA LC FREE SER-MCNC: 41 MG/L (ref 3.3–19.4)
KAPPA LC FREE/LAMBDA FREE SER: 5.19 {RATIO} (ref 0.26–1.65)
LAMBDA LC FREE SERPL-MCNC: 7.9 MG/L (ref 5.7–26.3)

## 2022-06-27 ENCOUNTER — OFFICE (AMBULATORY)
Dept: URBAN - METROPOLITAN AREA CLINIC 64 | Facility: CLINIC | Age: 37
End: 2022-06-27
Payer: COMMERCIAL

## 2022-06-27 VITALS
DIASTOLIC BLOOD PRESSURE: 97 MMHG | WEIGHT: 229 LBS | SYSTOLIC BLOOD PRESSURE: 137 MMHG | HEIGHT: 74 IN | HEART RATE: 58 BPM

## 2022-06-27 DIAGNOSIS — K21.9 GASTRO-ESOPHAGEAL REFLUX DISEASE WITHOUT ESOPHAGITIS: ICD-10-CM

## 2022-06-27 DIAGNOSIS — R19.7 DIARRHEA, UNSPECIFIED: ICD-10-CM

## 2022-06-27 PROCEDURE — 99214 OFFICE O/P EST MOD 30 MIN: CPT | Performed by: INTERNAL MEDICINE

## 2022-06-27 RX ORDER — COLESTIPOL HYDROCHLORIDE 1 G/1
TABLET, FILM COATED ORAL
Qty: 120 | Refills: 11 | Status: COMPLETED
Start: 2022-06-27 | End: 2023-08-22

## 2022-08-02 ENCOUNTER — LAB (OUTPATIENT)
Dept: LAB | Facility: HOSPITAL | Age: 37
End: 2022-08-02

## 2022-08-02 DIAGNOSIS — D47.2 MGUS (MONOCLONAL GAMMOPATHY OF UNKNOWN SIGNIFICANCE): ICD-10-CM

## 2022-08-02 LAB
ALBUMIN SERPL-MCNC: 4.4 G/DL (ref 3.5–5.2)
ALBUMIN/GLOB SERPL: 1.2 G/DL
ALP SERPL-CCNC: 60 U/L (ref 39–117)
ALT SERPL W P-5'-P-CCNC: 34 U/L (ref 1–41)
ANION GAP SERPL CALCULATED.3IONS-SCNC: 10 MMOL/L (ref 5–15)
AST SERPL-CCNC: 26 U/L (ref 1–40)
BASOPHILS # BLD AUTO: 0.03 10*3/MM3 (ref 0–0.2)
BASOPHILS NFR BLD AUTO: 0.5 % (ref 0–1.5)
BILIRUB SERPL-MCNC: 0.8 MG/DL (ref 0–1.2)
BUN SERPL-MCNC: 8 MG/DL (ref 6–20)
BUN/CREAT SERPL: 9.3 (ref 7–25)
CALCIUM SPEC-SCNC: 9 MG/DL (ref 8.6–10.5)
CHLORIDE SERPL-SCNC: 103 MMOL/L (ref 98–107)
CO2 SERPL-SCNC: 26 MMOL/L (ref 22–29)
CREAT SERPL-MCNC: 0.86 MG/DL (ref 0.76–1.27)
DEPRECATED RDW RBC AUTO: 43.1 FL (ref 37–54)
EGFRCR SERPLBLD CKD-EPI 2021: 114.4 ML/MIN/1.73
EOSINOPHIL # BLD AUTO: 0.07 10*3/MM3 (ref 0–0.4)
EOSINOPHIL NFR BLD AUTO: 1.2 % (ref 0.3–6.2)
ERYTHROCYTE [DISTWIDTH] IN BLOOD BY AUTOMATED COUNT: 13 % (ref 12.3–15.4)
GLOBULIN UR ELPH-MCNC: 3.7 GM/DL
GLUCOSE SERPL-MCNC: 86 MG/DL (ref 65–99)
HCT VFR BLD AUTO: 42.2 % (ref 37.5–51)
HGB BLD-MCNC: 15.3 G/DL (ref 13–17.7)
LYMPHOCYTES # BLD AUTO: 2.08 10*3/MM3 (ref 0.7–3.1)
LYMPHOCYTES NFR BLD AUTO: 36.1 % (ref 19.6–45.3)
MCH RBC QN AUTO: 34.1 PG (ref 26.6–33)
MCHC RBC AUTO-ENTMCNC: 36.3 G/DL (ref 31.5–35.7)
MCV RBC AUTO: 94 FL (ref 79–97)
MONOCYTES # BLD AUTO: 0.45 10*3/MM3 (ref 0.1–0.9)
MONOCYTES NFR BLD AUTO: 7.8 % (ref 5–12)
NEUTROPHILS NFR BLD AUTO: 3.13 10*3/MM3 (ref 1.7–7)
NEUTROPHILS NFR BLD AUTO: 54.4 % (ref 42.7–76)
PLATELET # BLD AUTO: 193 10*3/MM3 (ref 140–450)
PMV BLD AUTO: 10.9 FL (ref 6–12)
POTASSIUM SERPL-SCNC: 4.1 MMOL/L (ref 3.5–5.2)
PROT SERPL-MCNC: 8.1 G/DL (ref 6–8.5)
RBC # BLD AUTO: 4.49 10*6/MM3 (ref 4.14–5.8)
SODIUM SERPL-SCNC: 139 MMOL/L (ref 136–145)
WBC NRBC COR # BLD: 5.76 10*3/MM3 (ref 3.4–10.8)

## 2022-08-02 PROCEDURE — 36415 COLL VENOUS BLD VENIPUNCTURE: CPT

## 2022-08-02 PROCEDURE — 80053 COMPREHEN METABOLIC PANEL: CPT

## 2022-08-02 PROCEDURE — 85025 COMPLETE CBC W/AUTO DIFF WBC: CPT

## 2022-08-03 LAB
ALBUMIN SERPL ELPH-MCNC: 4.1 G/DL (ref 2.9–4.4)
ALBUMIN/GLOB SERPL: 1.1 {RATIO} (ref 0.7–1.7)
ALPHA1 GLOB SERPL ELPH-MCNC: 0.2 G/DL (ref 0–0.4)
ALPHA2 GLOB SERPL ELPH-MCNC: 0.5 G/DL (ref 0.4–1)
B-GLOBULIN SERPL ELPH-MCNC: 0.9 G/DL (ref 0.7–1.3)
GAMMA GLOB SERPL ELPH-MCNC: 2.2 G/DL (ref 0.4–1.8)
GLOBULIN SER-MCNC: 3.9 G/DL (ref 2.2–3.9)
IGA SERPL-MCNC: 85 MG/DL (ref 90–386)
IGG SERPL-MCNC: 2809 MG/DL (ref 603–1613)
IGM SERPL-MCNC: 50 MG/DL (ref 20–172)
INTERPRETATION SERPL IEP-IMP: ABNORMAL
KAPPA LC FREE SER-MCNC: 47.2 MG/L (ref 3.3–19.4)
KAPPA LC FREE/LAMBDA FREE SER: 6.47 {RATIO} (ref 0.26–1.65)
LABORATORY COMMENT REPORT: ABNORMAL
LAMBDA LC FREE SERPL-MCNC: 7.3 MG/L (ref 5.7–26.3)
M PROTEIN SERPL ELPH-MCNC: 2 G/DL
PROT SERPL-MCNC: 8 G/DL (ref 6–8.5)

## 2022-08-09 ENCOUNTER — APPOINTMENT (OUTPATIENT)
Dept: LAB | Facility: HOSPITAL | Age: 37
End: 2022-08-09

## 2022-08-09 ENCOUNTER — OFFICE VISIT (OUTPATIENT)
Dept: ONCOLOGY | Facility: CLINIC | Age: 37
End: 2022-08-09

## 2022-08-09 VITALS
DIASTOLIC BLOOD PRESSURE: 84 MMHG | HEART RATE: 65 BPM | TEMPERATURE: 96.8 F | BODY MASS INDEX: 29.52 KG/M2 | OXYGEN SATURATION: 100 % | SYSTOLIC BLOOD PRESSURE: 121 MMHG | HEIGHT: 74 IN | WEIGHT: 230 LBS

## 2022-08-09 DIAGNOSIS — D47.2 MGUS (MONOCLONAL GAMMOPATHY OF UNKNOWN SIGNIFICANCE): Primary | ICD-10-CM

## 2022-08-09 DIAGNOSIS — D47.2 MONOCLONAL GAMMOPATHY: ICD-10-CM

## 2022-08-09 PROCEDURE — 99214 OFFICE O/P EST MOD 30 MIN: CPT | Performed by: INTERNAL MEDICINE

## 2022-09-14 ENCOUNTER — OFFICE (AMBULATORY)
Dept: URBAN - METROPOLITAN AREA CLINIC 64 | Facility: CLINIC | Age: 37
End: 2022-09-14
Payer: COMMERCIAL

## 2022-09-14 VITALS
HEART RATE: 65 BPM | DIASTOLIC BLOOD PRESSURE: 95 MMHG | WEIGHT: 238 LBS | SYSTOLIC BLOOD PRESSURE: 133 MMHG | HEIGHT: 74 IN

## 2022-09-14 DIAGNOSIS — R10.9 UNSPECIFIED ABDOMINAL PAIN: ICD-10-CM

## 2022-09-14 DIAGNOSIS — R19.7 DIARRHEA, UNSPECIFIED: ICD-10-CM

## 2022-09-14 PROCEDURE — 99214 OFFICE O/P EST MOD 30 MIN: CPT | Performed by: INTERNAL MEDICINE

## 2022-09-14 RX ORDER — OMEPRAZOLE 40 MG/1
80 CAPSULE, DELAYED RELEASE ORAL
Qty: 180 | Refills: 3 | Status: ACTIVE
Start: 2022-09-14

## 2022-09-14 RX ORDER — ONDANSETRON 4 MG/1
8 TABLET, ORALLY DISINTEGRATING ORAL
Qty: 30 | Refills: 6 | Status: COMPLETED
Start: 2022-09-14 | End: 2023-08-22

## 2022-09-14 RX ORDER — RIFAXIMIN 550 MG/1
TABLET ORAL
Qty: 42 | Refills: 2 | Status: COMPLETED
Start: 2022-09-14 | End: 2023-08-22

## 2022-12-06 ENCOUNTER — APPOINTMENT (OUTPATIENT)
Dept: LAB | Facility: HOSPITAL | Age: 37
End: 2022-12-06
Payer: COMMERCIAL

## 2022-12-08 NOTE — PROGRESS NOTES
HEMATOLOGY ONCOLOGY FOLLOW UP        Patient name: Eleazar Jeong  : 1985  MRN: [id number]  Primary Care Physician: Ghislaine Wilks APRN  Referring Physician: Ghislaine Wilks APRN  Reason For Consult:     Chief Complaint   Patient presents with   • Follow-up     MGUS (monoclonal gammopathy of unknown significance)   Monoclonal gammopathy  History of polycythemia    History of Present Illness:    1. IgG kappa monoclonal gammopathy:    2018 to 2019 patient has not followed up in our office.  Patient was referred back by MARTIN Lacey due to IgG kappa monoclonal gammopathy  · 2019: Serum protein electrophoresis shows M protein 1.8 g/dL  · 2019 IgA 116, IgG 2206 high, IgM 55, serum immunofixation shows IgG kappa monoclonal gammopathy.  · 2019 creatinine 0.84, BUN 12,  high, ALT 70, iron 130, ferritin 34.3, iron saturation 38%, TIBC 346  WBC 6.7, hemoglobin 14.9, platelets 184, MCV 93.7   · 2019: Skeletal survey: No evidence of multiple myeloma.  No lytic or blastic lesions.  · 2019  SPEP M protein 1.38 free kappa light chains 37.6 high, Urine protein electrophoresis M spike 111.7, M spike to 3.38.,  24-hour urine protein is 260, urine immunofixation shows free kappa paraprotein peak and also a small IgG kappa paraprotein peak.  · 2019 calcium 9.5, creatinine 0.8, ALT 65 high, AST 57 high, hepatitis panel negative,  · 2019 Skeletal survey: No evidence of bone lytic lesions.  · 2019:UPEP shows to M spike's 4.5% and 1.3%.  24-hour urine protein to 60 high,  · 2020: WBC 9.2, hemoglobin 17.5, MCV 96.1, platelets 201  · 2020 Bone marrow aspiration and biopsy: Atypical marrow plasmacytosis 5 to 10%, consistent with plasma cell neoplasm.  Normocellular to focally mildly hypocellular marrow for age, 40 to 50%, with trilineage hematopoiesis.  No increase in reticulin fibrosis.  Storage iron present.  Flow cytometry: Clonal plasma  cell population detected 1% of analyzed cells, with aberrant expression of  and cytoplasmic kappa light chain restriction.,  Normal cytogenetics 46 XY  · 3/11/2020 IgG 2104, IgM 51, IgA 101, SPEP shows M protein 1.7  . Free kappa light chains 48.1, free lambda light chain 6.9, kappa lambda ratio 6.97 high, WBC 7.8, hemoglobin 14.9, platelets 206, MCV 93.6  · 3/11/2020:  high,  high, albumin 4.3, alk phos 56, bilirubin 0.6  · 3/18/2020 WBC 7.7, hemoglobin 16, MCV 94, platelets 202,  · 3/18/2020: IgG 2304 high, IgM 53, IgA 108, free kappa light chains 55.8 high, free lambda light chains 8.2, kappa lambda ratio 6.8 high  · 4/28/2020: Liver biopsy: Benign liver with no significant histopathology.  No evidence of malignancy.  Esophageal biopsy shows glandular mucosa with intestinal metaplasia consistent with Matson's esophagus.  Negative for dysplasia.   · 6/3/2020: CMP normal, WBC 6.9, hemoglobin 14.9, platelets 220, MCV 95.2  · 6/10/2020: WBC 8.39, hemoglobin 15.1, platelets 212, SPEP shows M protein 1.8 g/dL.,  Free kappa light chains 51.2 high, free lambda light chains 11.3, kappa lambda ratio 4.53 high, IgG 2242 high,  · 8/6/2020: WBC 8, hemoglobin 16.1, platelets 186, IgA 94, IgG 1940 high, IgM 54 low, free kappa light chains 3695 high, free lambda light chain 6.75, kappa lambda ratio 5.47 high,Calcium 9.3, AST ALT normal, albumin 4.2, TSH 0.6, total testosterone 482, SPEP shows M protein 1.55,  · 8/20/2020: CTs soft tissue neck with and without contrast: No acute process seen in the neck.  No abnormal soft tissue mass or fluid collection.  No cervical adenopathy.  · 8/21/2020: PET CT scan: No convincing hypermetabolic osseous malignancy.  Right prepatellar soft tissue thickening and fluid with diffuse mild metabolic activity suggestive of inflammatory process.    · 9/2/2020:SPEP shows M protein 1.8 g/dL, creatinine 0.94, LFTs normal, creatinine 0.94, LFTs normal, WBC 6.9, hemoglobin 16,  platelets 202, MCV 92.1  · 12/8/2020: CMP normal, hemoglobin 17, platelets 226, SPEP shows M protein 1.6, IgG 2685 high, IgA 116, IgM 69, kappa lambda ratio 5.46 high,  · 2/9/2021: WBC 7.4, hemoglobin 16.7, platelets 231, MCV 95.1, CMP normal,  · 12/3/2021: M spike 2.0, free light chain ratio 6.32, kappa free light chains 43.6, IgG up to 2552.  · 8/2022: M spike 2.0, IgG 2809, kappa 47.2, lambda 7.3, k/l 6.47      2.  History of polycythemia:  Patient presented to PCP Dr. Ann’s office on 4/11/16 that showed hemoglobin of 18.3.  MCV was  95.0.  He is referred to us for polycythemia.  The patient has a history of chronic anxiety and   depression.   · 4/11/16 - WBC 9.2, hemoglobin 18.3, MCV 95.0, platelet count 182,000.  Differential normal.    Vitamin B12 554.  · 5/10/2016, the patient presents for initial consultation for polycythemia.  He reports symptoms of occasional headaches.  He does have history of migraines.  He also reports some  nonspecific vision problems.  He also reports weight loss of around 30 pounds during the past six months.  He denies any fevers or any lymph node enlargement.  He also reports some night sweats.  He does feel tired all the time.  He denies having any snoring, but he lives alone.  He does report some symptoms of excessive daytime sleepiness.  He smokes about 3-4 cigars per day.  He  denies having a CBC in the past.    ·  5/10/16 - Creatinine 0.87.  LFTs normal.  WBC 7.3, hemoglobin 16.7, MCV 94.5, platelet count  189,000.  Erythropoietin level 10.6.  JAK2 V617 mutations negative.  Exon 12 and exon 13 mutation  negative.   mutation negative.    ·  6/8/16 - Bone marrow biopsy:  Hemodiluted hypocellular aspirate.  However, flow cytometry is  adequate and it does not reveal any abnormal immunophenotype.  Cytogenetics shows normal  karyotype.  The clot section showed a single bone marrow particle.  There was no evidence of  ringed sideroblasts.    · 6/30/16 - WBC 8.7, hemoglobin  17.6, MCV 94, platelet count 166,000.  · 9/1/16 - WBC 9.4, hemoglobin 16.8, MCV 94.9, platelet count 204,000.  Patient underwent   phlebotomy.  ·  1/5/17 - Hemoglobin 16.5.  Patient underwent phlebotomy.  ·  1/9/17 - WBC 9.8, hemoglobin 15.3, platelet count 195,000.    · 5/12/17 - WBC 6.9, hemoglobin 15.9, platelet count 185,000, MCV 91.3.    · 9/15/17 - WBC 9.4, hemoglobin 16.2, platelet count 201,000, MCV 91.1.   · 12/6/17 - EGD:  Probable Matson’s esophagus. Antral gastritis.  Glandular mucosa with focal intestinal metaplasia, consistent with Matson’s.    ·  3/21/18 - Brain MRI with and without contrast:  A 2.3 cm structure along the superior margin of the cerebellum at the midline, consistent arachnoid cyst.       Subjective  Patient has started to have increasing right hip pain. Increasing fatigue.      Past Medical History:   Diagnosis Date   • Anxiety    • Benign monoclonal gammopathy     dr. quiroz   • Colon polyp    • Depression    • Elevated liver enzymes 04/2020   • Erectile dysfunction    • GERD (gastroesophageal reflux disease)    • Headache    • Hypoglycemia    • Left sided abdominal pain    • Low back pain    • Migraine    • Nausea    • Pneumonia 03/2019   • Tremor    • Visual impairment     wears glasses       Past Surgical History:   Procedure Laterality Date   • COLONOSCOPY     • UPPER ENDOSCOPIC ULTRASOUND W/ FNA N/A 4/28/2020    Procedure: Esophagogastroduodenoscopy and endoscopic ultrasound with biopsy of liver, biopsy of esophagus;  Surgeon: Danilo Balderrama MD;  Location: [facility] ENDOSCOPY;  Service: Gastroenterology;  Laterality: N/A;  gastritis, hiatel hernia, barretts esophagus       Current Outpatient Medications:   •  colestipol (COLESTID) 1 g tablet, Take 1 g by mouth Daily. Sometimes takes 2 per day as instructed, Disp: , Rfl:   •  diphenhydrAMINE (BENADRYL) 25 MG tablet, Take 25 mg by mouth Every 6 (Six) Hours As Needed., Disp: , Rfl:   •  diphenoxylate-atropine (LOMOTIL) 2.5-0.025 MG  per tablet, TAKE 1 TABLET BY MOUTH EVERY 6 HOURS AS NEEDED FOR DIARRHEA, Disp: , Rfl:   •  ibuprofen (ADVIL,MOTRIN) 800 MG tablet, Take 800 mg by mouth Every 8 (Eight) Hours. Stop now for procedure, Disp: , Rfl:   •  Loperamide HCl (IMODIUM PO), 1 dose Daily As Needed., Disp: , Rfl:   •  meclizine (ANTIVERT) 12.5 MG tablet, 1-2 tabs PO TID PRN dizziness, Disp: 60 tablet, Rfl: 1  •  Meth-Hyo-M Bl-Na Phos-Ph Sal (Uro-MP) 118 MG capsule, Take 1 capsule by mouth Every 8 (Eight) Hours., Disp: , Rfl:   •  montelukast (SINGULAIR) 10 MG tablet, Take 10 mg by mouth Every Night., Disp: , Rfl:   •  omeprazole (priLOSEC) 40 MG capsule, Take 40 mg by mouth Daily., Disp: , Rfl:   •  ondansetron ODT (ZOFRAN-ODT) 4 MG disintegrating tablet, DISSOLVE 1 TABLET UNDER THE TONGUE TWICE DAILY AS NEEDED FOR NAUSEA OR VOMITING, Disp: , Rfl:   •  pantoprazole (PROTONIX) 40 MG EC tablet, Take 40 mg by mouth 2 (Two) Times a Day., Disp: , Rfl:   •  promethazine (PHENERGAN) 25 MG tablet, TAKE 1 TABLET BY MOUTH EVERY 4 TO 6 HOURS AS NEEDED FOR NAUSEA AND VOMITING, Disp: , Rfl:   •  sildenafil (REVATIO) 20 MG tablet, Take 20 mg by mouth Daily As Needed. Do not use 24 hours prior to procedure, Disp: , Rfl: 99  •  sildenafil (VIAGRA) 100 MG tablet, Take 100 mg by mouth., Disp: , Rfl:   •  sildenafil (VIAGRA) 25 MG tablet, Take 25 mg by mouth See Admin Instructions., Disp: , Rfl:   •  Viberzi 100 MG tablet, Take 1 tablet by mouth 2 (Two) Times a Day., Disp: , Rfl:   •  Xifaxan 550 MG tablet, TAKE 1 TABLET BY MOUTH THREE TIMES DAILY FOR 14 DAYS FOR IRRITABLE BOWEL SYNDROME, Disp: , Rfl:     Allergies   Allergen Reactions   • Amoxicillin Unknown (See Comments)   • Erythromycin Hives   • Levofloxacin Hives   • Penicillin G Unknown (See Comments)   • Penicillins Other (See Comments)       Family History   Problem Relation Age of Onset   • Diabetes Mother    • Vision loss Mother    • COPD Paternal Aunt    • Vision loss Paternal Aunt    • Diabetes  Maternal Grandmother    • Vision loss Maternal Grandmother    • Cancer Maternal Grandmother    • Heart disease Paternal Grandmother    • Stroke Paternal Grandmother    • Heart disease Paternal Grandfather    • Vision loss Paternal Grandfather    • Vision loss Father    • Vision loss Sister        Cancer-related family history includes Cancer in his maternal grandmother.    Social History     Tobacco Use   • Smoking status: Every Day     Packs/day: 1.00     Years: 17.00     Pack years: 17.00     Types: Cigarettes, Cigars, Electronic Cigarette     Start date: 8/2/2002   • Smokeless tobacco: Never   Substance Use Topics   • Alcohol use: Not Currently   • Drug use: Not Currently       ROS:     Objective:  Vital signs:  Vitals:    12/13/22 0901   BP: 131/85   Pulse: 74   Temp: 98.4 °F (36.9 °C)   PainSc:   7   PainLoc: Hip     ECOG  (0) Fully active, able to carry on all predisease performance without restriction    Physical Exam:   Physical Exam   Constitutional: He is oriented to person, place, and time. He appears well-developed. No distress.   HENT:   Head: Normocephalic and atraumatic.   Eyes: Conjunctivae are normal.   Neck: No thyromegaly present.   Cardiovascular: Normal rate, regular rhythm, normal heart sounds and normal pulses. Exam reveals no gallop and no friction rub.   Pulmonary/Chest: Effort normal and breath sounds normal.   Slightly decreased air entry bilaterally   Abdominal: Soft. Normal appearance and bowel sounds are normal.   Musculoskeletal: Normal range of motion. No tenderness.      Comments: Healing surgical scar noted on the right knee   Lymphadenopathy:     He has no cervical adenopathy.   Neurological: He is alert and oriented to person, place, and time. He exhibits normal muscle tone.   Skin: Skin is warm. No rash noted. He is not diaphoretic. No erythema.   Multiple tattoos   Psychiatric: His behavior is normal. Mood normal.       Lab Results - Last 18 Months   Lab Units 08/02/22  4984  04/11/22  0908 12/03/21  0835   WBC 10*3/mm3 5.76 7.22 7.52   HEMOGLOBIN g/dL 15.3 16.0 15.0   HEMATOCRIT % 42.2 45.4 43.0   PLATELETS 10*3/mm3 193 197 187   MCV fL 94.0 95.6 96.0     Lab Results - Last 18 Months   Lab Units 08/02/22  0823 04/11/22  1000 12/03/21  0835   SODIUM mmol/L 139 138 140   POTASSIUM mmol/L 4.1 3.6 3.7   CHLORIDE mmol/L 103 103 103   CO2 mmol/L 26.0 25.0 24.0   BUN mg/dL 8 8 11   CREATININE mg/dL 0.86 0.93 0.95   CALCIUM mg/dL 9.0 9.3 9.0   BILIRUBIN mg/dL 0.8 0.8 0.6   ALK PHOS U/L 60 62 62   ALT (SGPT) U/L 34 42* 39   AST (SGOT) U/L 26 23 23   GLUCOSE mg/dL 86 97 82       Lab Results   Component Value Date    GLUCOSE 86 08/02/2022    BUN 8 08/02/2022    CREATININE 0.86 08/02/2022    EGFRIFNONA 90 12/03/2021    BCR 9.3 08/02/2022    K 4.1 08/02/2022    CO2 26.0 08/02/2022    CALCIUM 9.0 08/02/2022    PROTENTOTREF 8.0 08/02/2022    ALBUMIN 4.1 08/02/2022    ALBUMIN 4.40 08/02/2022    LABIL2 1.1 08/02/2022    AST 26 08/02/2022    ALT 34 08/02/2022       No results for input(s): APTT, INR, PTT in the last 98600 hours.    Lab Results   Component Value Date    IRON 130 12/13/2019    TIBC 346 12/13/2019    FERRITIN 34.35 12/13/2019       No results found for: FOLATE    No results found for: OCCULTBLD    No results found for: RETICCTPCT    Lab Results   Component Value Date    FICXGATP55 319 06/13/2019     Lab Results   Component Value Date    SPEP Comment 04/11/2022     No results found for: LDH, URICACID  Lab Results   Component Value Date    SEDRATE 3 11/05/2019     No results found for: FIBRINOGEN, HAPTOGLOBIN  Lab Results   Component Value Date    PTT 24.9 01/17/2020    INR 1.04 01/17/2020     No results found for:   No results found for: CEA  No components found for: CA-19-9  No results found for: PSA      Assessment & Plan     Assessment:  1.  High risk IgG kappa MGUS: Diagnosed November 2019.  High-intermediate risk.  PET CT scan on 8/21/2020 does not show any evidence of bone  lesions..  No crab symptoms.  M protein was 1.8 initially,   Skeletal survey was negative.  He does not report any frequent infections..  Bone marrow biopsy shows about 5 to 10% plasma cells.  Cytogenetics showed normal karyotype.  They were unable to run the FISH panel.  Urine immunofixation is also positive.  M protein remains stable as of July 2021.  No longer M protein is slightly increased to 2.0.  CMP still unremarkable no elevated creatinine, no anemia no elevated calcium. In 8/2022-  Stable M protein at 2, stable flc, monitor for now. Repeat levels now. CT of hip to rule out myeloma involvement.  2. History of elevated liver enzymes: ALT is up to 193 and AST up to 602 as of 3/11/2020.  Patient is following with gastroenterology.  .  Status post liver biopsy which did not reveal any pathology.  Repeat LFTs have normalized.  Continue stay normal repeat todau   3. History of secondary polycythemia: Seems resolved. Bone marrow biopsy was unremarkable.  JAK2 mutation and EPO  level do not suggest any evidence of polycythemia vera.  He has secondary polycythemia likely due  to tobacco use and from sleep apnea.  The patient continues to smoke.  Patient did not want to  have a sleep study.  He has not required phlebotomy for the last 3-4 years His hemoglobin has stabilized. Repeat today pedning.  4. Fatigue: TSH cortisol WNL.   5. Vertigo : Takes Meclizine. Has seen ENT.  6. History of Matson’s esophagus.  He follows up with Dr. Hernandez.  Will need surveillance endoscopies. Has had 2 endoscopies he states at [facility].  increasing symptoms now. Will follow up with Dr. Hernandez.  7. History of tobacco use: has stopped smoking, he vapes    PLAN:  1. Repeat myeloma today, CT Hip.  2. Continue follow-up with gastroenterology for Matson's esophagus, diarrhea,   3. We will follow patient back in 4 months with repeat myeloma labs.      There are no diagnoses linked to this encounter.  No orders of the defined types were  placed in this encounter.

## 2022-12-13 ENCOUNTER — OFFICE VISIT (OUTPATIENT)
Dept: ONCOLOGY | Facility: CLINIC | Age: 37
End: 2022-12-13

## 2022-12-13 ENCOUNTER — OFFICE VISIT (OUTPATIENT)
Dept: LAB | Facility: HOSPITAL | Age: 37
End: 2022-12-13
Payer: COMMERCIAL

## 2022-12-13 VITALS — SYSTOLIC BLOOD PRESSURE: 131 MMHG | HEART RATE: 74 BPM | TEMPERATURE: 98.4 F | DIASTOLIC BLOOD PRESSURE: 85 MMHG

## 2022-12-13 DIAGNOSIS — D47.2 MGUS (MONOCLONAL GAMMOPATHY OF UNKNOWN SIGNIFICANCE): ICD-10-CM

## 2022-12-13 DIAGNOSIS — D47.2 MONOCLONAL GAMMOPATHY: ICD-10-CM

## 2022-12-13 DIAGNOSIS — D47.2 MGUS (MONOCLONAL GAMMOPATHY OF UNKNOWN SIGNIFICANCE): Primary | ICD-10-CM

## 2022-12-13 DIAGNOSIS — M25.559 HIP PAIN: ICD-10-CM

## 2022-12-13 LAB
ALBUMIN SERPL-MCNC: 4.5 G/DL (ref 3.5–5.2)
ALBUMIN/GLOB SERPL: 1.3 G/DL
ALP SERPL-CCNC: 65 U/L (ref 39–117)
ALT SERPL W P-5'-P-CCNC: 41 U/L (ref 1–41)
ANION GAP SERPL CALCULATED.3IONS-SCNC: 9 MMOL/L (ref 5–15)
AST SERPL-CCNC: 28 U/L (ref 1–40)
BASOPHILS # BLD AUTO: 0.03 10*3/MM3 (ref 0–0.2)
BASOPHILS NFR BLD AUTO: 0.4 % (ref 0–1.5)
BILIRUB SERPL-MCNC: 1.2 MG/DL (ref 0–1.2)
BUN SERPL-MCNC: 9 MG/DL (ref 6–20)
BUN/CREAT SERPL: 9.9 (ref 7–25)
CALCIUM SPEC-SCNC: 9.2 MG/DL (ref 8.6–10.5)
CHLORIDE SERPL-SCNC: 104 MMOL/L (ref 98–107)
CO2 SERPL-SCNC: 25 MMOL/L (ref 22–29)
CREAT SERPL-MCNC: 0.91 MG/DL (ref 0.76–1.27)
DEPRECATED RDW RBC AUTO: 43.3 FL (ref 37–54)
EGFRCR SERPLBLD CKD-EPI 2021: 111.3 ML/MIN/1.73
EOSINOPHIL # BLD AUTO: 0.09 10*3/MM3 (ref 0–0.4)
EOSINOPHIL NFR BLD AUTO: 1.3 % (ref 0.3–6.2)
ERYTHROCYTE [DISTWIDTH] IN BLOOD BY AUTOMATED COUNT: 12.9 % (ref 12.3–15.4)
GLOBULIN UR ELPH-MCNC: 3.6 GM/DL
GLUCOSE SERPL-MCNC: 93 MG/DL (ref 65–99)
HCT VFR BLD AUTO: 42.7 % (ref 37.5–51)
HGB BLD-MCNC: 15.4 G/DL (ref 13–17.7)
LYMPHOCYTES # BLD AUTO: 2.57 10*3/MM3 (ref 0.7–3.1)
LYMPHOCYTES NFR BLD AUTO: 38 % (ref 19.6–45.3)
MCH RBC QN AUTO: 34.1 PG (ref 26.6–33)
MCHC RBC AUTO-ENTMCNC: 36.1 G/DL (ref 31.5–35.7)
MCV RBC AUTO: 94.5 FL (ref 79–97)
MONOCYTES # BLD AUTO: 0.82 10*3/MM3 (ref 0.1–0.9)
MONOCYTES NFR BLD AUTO: 12.1 % (ref 5–12)
NEUTROPHILS NFR BLD AUTO: 3.26 10*3/MM3 (ref 1.7–7)
NEUTROPHILS NFR BLD AUTO: 48.2 % (ref 42.7–76)
PLATELET # BLD AUTO: 199 10*3/MM3 (ref 140–450)
PMV BLD AUTO: 11 FL (ref 6–12)
POTASSIUM SERPL-SCNC: 4 MMOL/L (ref 3.5–5.2)
PROT SERPL-MCNC: 8.1 G/DL (ref 6–8.5)
RBC # BLD AUTO: 4.52 10*6/MM3 (ref 4.14–5.8)
SODIUM SERPL-SCNC: 138 MMOL/L (ref 136–145)
WBC NRBC COR # BLD: 6.77 10*3/MM3 (ref 3.4–10.8)

## 2022-12-13 PROCEDURE — 85025 COMPLETE CBC W/AUTO DIFF WBC: CPT

## 2022-12-13 PROCEDURE — 36415 COLL VENOUS BLD VENIPUNCTURE: CPT

## 2022-12-13 PROCEDURE — 80053 COMPREHEN METABOLIC PANEL: CPT

## 2022-12-13 PROCEDURE — 99214 OFFICE O/P EST MOD 30 MIN: CPT | Performed by: INTERNAL MEDICINE

## 2022-12-13 RX ORDER — PROMETHAZINE HYDROCHLORIDE 25 MG/1
TABLET ORAL
COMMUNITY
Start: 2022-09-06

## 2022-12-13 RX ORDER — METHENAMINE, SODIUM PHOSPHATE, MONOBASIC, ANHYDROUS, PHENYL SALICYLATE, METHYLENE BLUE AND HYOSCYAMINE SULFATE 118; 40.8; 36; 10; .12 MG/1; MG/1; MG/1; MG/1; MG/1
1 CAPSULE ORAL EVERY 8 HOURS
COMMUNITY
Start: 2022-11-16

## 2022-12-13 RX ORDER — SILDENAFIL 25 MG/1
25 TABLET, FILM COATED ORAL SEE ADMIN INSTRUCTIONS
COMMUNITY
Start: 2022-12-08

## 2022-12-13 RX ORDER — RIFAXIMIN 550 MG/1
TABLET ORAL
COMMUNITY
Start: 2022-09-19

## 2022-12-13 RX ORDER — ONDANSETRON 4 MG/1
TABLET, ORALLY DISINTEGRATING ORAL
COMMUNITY
Start: 2022-09-14

## 2022-12-14 LAB
ALBUMIN SERPL ELPH-MCNC: 4 G/DL (ref 2.9–4.4)
ALBUMIN/GLOB SERPL: 1.1 {RATIO} (ref 0.7–1.7)
ALPHA1 GLOB SERPL ELPH-MCNC: 0.2 G/DL (ref 0–0.4)
ALPHA2 GLOB SERPL ELPH-MCNC: 0.5 G/DL (ref 0.4–1)
B-GLOBULIN SERPL ELPH-MCNC: 0.9 G/DL (ref 0.7–1.3)
GAMMA GLOB SERPL ELPH-MCNC: 2.2 G/DL (ref 0.4–1.8)
GLOBULIN SER-MCNC: 3.9 G/DL (ref 2.2–3.9)
IGA SERPL-MCNC: 79 MG/DL (ref 90–386)
IGG SERPL-MCNC: 2676 MG/DL (ref 603–1613)
IGM SERPL-MCNC: 45 MG/DL (ref 20–172)
INTERPRETATION SERPL IEP-IMP: ABNORMAL
LABORATORY COMMENT REPORT: ABNORMAL
M PROTEIN SERPL ELPH-MCNC: 1.9 G/DL
PROT SERPL-MCNC: 7.9 G/DL (ref 6–8.5)

## 2022-12-15 LAB
KAPPA LC FREE SER-MCNC: 72.1 MG/L (ref 3.3–19.4)
KAPPA LC FREE/LAMBDA FREE SER: 9.13 {RATIO} (ref 0.26–1.65)
LAMBDA LC FREE SERPL-MCNC: 7.9 MG/L (ref 5.7–26.3)

## 2023-04-04 ENCOUNTER — HOSPITAL ENCOUNTER (OUTPATIENT)
Dept: PET IMAGING | Facility: HOSPITAL | Age: 38
Discharge: HOME | End: 2023-04-04
Admitting: INTERNAL MEDICINE
Payer: COMMERCIAL

## 2023-04-04 ENCOUNTER — LAB (OUTPATIENT)
Dept: LAB | Facility: HOSPITAL | Age: 38
End: 2023-04-04
Payer: COMMERCIAL

## 2023-04-04 DIAGNOSIS — D47.2 MGUS (MONOCLONAL GAMMOPATHY OF UNKNOWN SIGNIFICANCE): ICD-10-CM

## 2023-04-04 DIAGNOSIS — M25.559 HIP PAIN: ICD-10-CM

## 2023-04-04 LAB
ALBUMIN SERPL-MCNC: 4.5 G/DL (ref 3.5–5.2)
ALBUMIN/GLOB SERPL: 1.1 G/DL
ALP SERPL-CCNC: 72 U/L (ref 39–117)
ALT SERPL W P-5'-P-CCNC: 36 U/L (ref 1–41)
ANION GAP SERPL CALCULATED.3IONS-SCNC: 9 MMOL/L (ref 5–15)
AST SERPL-CCNC: 23 U/L (ref 1–40)
BASOPHILS # BLD AUTO: 0.03 10*3/MM3 (ref 0–0.2)
BASOPHILS NFR BLD AUTO: 0.4 % (ref 0–1.5)
BILIRUB SERPL-MCNC: 1.5 MG/DL (ref 0–1.2)
BUN SERPL-MCNC: 11 MG/DL (ref 6–20)
BUN/CREAT SERPL: 11.8 (ref 7–25)
CALCIUM SPEC-SCNC: 9.4 MG/DL (ref 8.6–10.5)
CHLORIDE SERPL-SCNC: 102 MMOL/L (ref 98–107)
CO2 SERPL-SCNC: 27 MMOL/L (ref 22–29)
CREAT SERPL-MCNC: 0.93 MG/DL (ref 0.76–1.27)
DEPRECATED RDW RBC AUTO: 45.6 FL (ref 37–54)
EGFRCR SERPLBLD CKD-EPI 2021: 108.5 ML/MIN/1.73
EOSINOPHIL # BLD AUTO: 0.05 10*3/MM3 (ref 0–0.4)
EOSINOPHIL NFR BLD AUTO: 0.6 % (ref 0.3–6.2)
ERYTHROCYTE [DISTWIDTH] IN BLOOD BY AUTOMATED COUNT: 13.7 % (ref 12.3–15.4)
GLOBULIN UR ELPH-MCNC: 4.2 GM/DL
GLUCOSE SERPL-MCNC: 79 MG/DL (ref 65–99)
HCT VFR BLD AUTO: 46.1 % (ref 37.5–51)
HGB BLD-MCNC: 16.5 G/DL (ref 13–17.7)
LYMPHOCYTES # BLD AUTO: 2.04 10*3/MM3 (ref 0.7–3.1)
LYMPHOCYTES NFR BLD AUTO: 24.9 % (ref 19.6–45.3)
MCH RBC QN AUTO: 33.9 PG (ref 26.6–33)
MCHC RBC AUTO-ENTMCNC: 35.8 G/DL (ref 31.5–35.7)
MCV RBC AUTO: 94.7 FL (ref 79–97)
MONOCYTES # BLD AUTO: 0.64 10*3/MM3 (ref 0.1–0.9)
MONOCYTES NFR BLD AUTO: 7.8 % (ref 5–12)
NEUTROPHILS NFR BLD AUTO: 5.42 10*3/MM3 (ref 1.7–7)
NEUTROPHILS NFR BLD AUTO: 66.3 % (ref 42.7–76)
PLATELET # BLD AUTO: 197 10*3/MM3 (ref 140–450)
PMV BLD AUTO: 9.9 FL (ref 6–12)
POTASSIUM SERPL-SCNC: 3.6 MMOL/L (ref 3.5–5.2)
PROT SERPL-MCNC: 8.7 G/DL (ref 6–8.5)
RBC # BLD AUTO: 4.87 10*6/MM3 (ref 4.14–5.8)
SODIUM SERPL-SCNC: 138 MMOL/L (ref 136–145)
WBC NRBC COR # BLD: 8.18 10*3/MM3 (ref 3.4–10.8)

## 2023-04-04 PROCEDURE — 36415 COLL VENOUS BLD VENIPUNCTURE: CPT

## 2023-04-04 PROCEDURE — 72192 CT PELVIS W/O DYE: CPT

## 2023-04-04 PROCEDURE — 85025 COMPLETE CBC W/AUTO DIFF WBC: CPT

## 2023-04-04 PROCEDURE — 80053 COMPREHEN METABOLIC PANEL: CPT | Performed by: INTERNAL MEDICINE

## 2023-04-05 DIAGNOSIS — D47.2 MGUS (MONOCLONAL GAMMOPATHY OF UNKNOWN SIGNIFICANCE): Primary | ICD-10-CM

## 2023-04-05 LAB
ALBUMIN SERPL ELPH-MCNC: 4.1 G/DL (ref 2.9–4.4)
ALBUMIN/GLOB SERPL: 1 {RATIO} (ref 0.7–1.7)
ALPHA1 GLOB SERPL ELPH-MCNC: 0.2 G/DL (ref 0–0.4)
ALPHA2 GLOB SERPL ELPH-MCNC: 0.7 G/DL (ref 0.4–1)
B-GLOBULIN SERPL ELPH-MCNC: 1 G/DL (ref 0.7–1.3)
GAMMA GLOB SERPL ELPH-MCNC: 2.4 G/DL (ref 0.4–1.8)
GLOBULIN SER-MCNC: 4.3 G/DL (ref 2.2–3.9)
IGA SERPL-MCNC: 95 MG/DL (ref 90–386)
IGG SERPL-MCNC: 2920 MG/DL (ref 603–1613)
IGM SERPL-MCNC: 57 MG/DL (ref 20–172)
INTERPRETATION SERPL IEP-IMP: ABNORMAL
KAPPA LC FREE SER-MCNC: 61.1 MG/L (ref 3.3–19.4)
KAPPA LC FREE/LAMBDA FREE SER: 8.61 {RATIO} (ref 0.26–1.65)
LABORATORY COMMENT REPORT: ABNORMAL
LAMBDA LC FREE SERPL-MCNC: 7.1 MG/L (ref 5.7–26.3)
M PROTEIN SERPL ELPH-MCNC: 2.2 G/DL
PROT SERPL-MCNC: 8.4 G/DL (ref 6–8.5)

## 2023-04-07 NOTE — PROGRESS NOTES
HEMATOLOGY ONCOLOGY FOLLOW UP        Patient name: Eleazar Jeong  : 1985  MRN: [id number]  Primary Care Physician: Ghislaine Wilks APRN  Referring Physician: Ghislaine Wilks APRN  Reason For Consult:     Chief Complaint   Patient presents with   • Follow-up     MGUS (monoclonal gammopathy of unknown significance)   Monoclonal gammopathy  History of polycythemia    History of Present Illness:    1. IgG kappa monoclonal gammopathy:    2018 to 2019 patient has not followed up in our office.  Patient was referred back by MARTIN Lacey due to IgG kappa monoclonal gammopathy  · 2019: Serum protein electrophoresis shows M protein 1.8 g/dL  · 2019 IgA 116, IgG 2206 high, IgM 55, serum immunofixation shows IgG kappa monoclonal gammopathy.  · 2019 creatinine 0.84, BUN 12,  high, ALT 70, iron 130, ferritin 34.3, iron saturation 38%, TIBC 346  WBC 6.7, hemoglobin 14.9, platelets 184, MCV 93.7   · 2019: Skeletal survey: No evidence of multiple myeloma.  No lytic or blastic lesions.  · 2019  SPEP M protein 1.38 free kappa light chains 37.6 high, Urine protein electrophoresis M spike 111.7, M spike to 3.38.,  24-hour urine protein is 260, urine immunofixation shows free kappa paraprotein peak and also a small IgG kappa paraprotein peak.  · 2019 calcium 9.5, creatinine 0.8, ALT 65 high, AST 57 high, hepatitis panel negative,  · 2019 Skeletal survey: No evidence of bone lytic lesions.  · 2019:UPEP shows to M spike's 4.5% and 1.3%.  24-hour urine protein to 60 high,  · 2020: WBC 9.2, hemoglobin 17.5, MCV 96.1, platelets 201  · 2020 Bone marrow aspiration and biopsy: Atypical marrow plasmacytosis 5 to 10%, consistent with plasma cell neoplasm.  Normocellular to focally mildly hypocellular marrow for age, 40 to 50%, with trilineage hematopoiesis.  No increase in reticulin fibrosis.  Storage iron present.  Flow cytometry: Clonal plasma  cell population detected 1% of analyzed cells, with aberrant expression of  and cytoplasmic kappa light chain restriction.,  Normal cytogenetics 46 XY  · 3/11/2020 IgG 2104, IgM 51, IgA 101, SPEP shows M protein 1.7  . Free kappa light chains 48.1, free lambda light chain 6.9, kappa lambda ratio 6.97 high, WBC 7.8, hemoglobin 14.9, platelets 206, MCV 93.6  · 3/11/2020:  high,  high, albumin 4.3, alk phos 56, bilirubin 0.6  · 3/18/2020 WBC 7.7, hemoglobin 16, MCV 94, platelets 202,  · 3/18/2020: IgG 2304 high, IgM 53, IgA 108, free kappa light chains 55.8 high, free lambda light chains 8.2, kappa lambda ratio 6.8 high  · 4/28/2020: Liver biopsy: Benign liver with no significant histopathology.  No evidence of malignancy.  Esophageal biopsy shows glandular mucosa with intestinal metaplasia consistent with Matson's esophagus.  Negative for dysplasia.   · 6/3/2020: CMP normal, WBC 6.9, hemoglobin 14.9, platelets 220, MCV 95.2  · 6/10/2020: WBC 8.39, hemoglobin 15.1, platelets 212, SPEP shows M protein 1.8 g/dL.,  Free kappa light chains 51.2 high, free lambda light chains 11.3, kappa lambda ratio 4.53 high, IgG 2242 high,  · 8/6/2020: WBC 8, hemoglobin 16.1, platelets 186, IgA 94, IgG 1940 high, IgM 54 low, free kappa light chains 3695 high, free lambda light chain 6.75, kappa lambda ratio 5.47 high,Calcium 9.3, AST ALT normal, albumin 4.2, TSH 0.6, total testosterone 482, SPEP shows M protein 1.55,  · 8/20/2020: CTs soft tissue neck with and without contrast: No acute process seen in the neck.  No abnormal soft tissue mass or fluid collection.  No cervical adenopathy.  · 8/21/2020: PET CT scan: No convincing hypermetabolic osseous malignancy.  Right prepatellar soft tissue thickening and fluid with diffuse mild metabolic activity suggestive of inflammatory process.    · 9/2/2020:SPEP shows M protein 1.8 g/dL, creatinine 0.94, LFTs normal, creatinine 0.94, LFTs normal, WBC 6.9, hemoglobin 16,  platelets 202, MCV 92.1  · 12/8/2020: CMP normal, hemoglobin 17, platelets 226, SPEP shows M protein 1.6, IgG 2685 high, IgA 116, IgM 69, kappa lambda ratio 5.46 high,  · 2/9/2021: WBC 7.4, hemoglobin 16.7, platelets 231, MCV 95.1, CMP normal,  · 12/3/2021: M spike 2.0, free light chain ratio 6.32, kappa free light chains 43.6, IgG up to 2552.  · 8/2022: M spike 2.0, IgG 2809, kappa 47.2, lambda 7.3, k/l 6.47  · 4/4/2023 - M spike 2.2, IgG 2920 k 61.1, k/l 8.61      2.  History of polycythemia:  Patient presented to PCP Dr. Ann’s office on 4/11/16 that showed hemoglobin of 18.3.  MCV was  95.0.  He is referred to us for polycythemia.  The patient has a history of chronic anxiety and   depression.   · 4/11/16 - WBC 9.2, hemoglobin 18.3, MCV 95.0, platelet count 182,000.  Differential normal.    Vitamin B12 554.  · 5/10/2016, the patient presents for initial consultation for polycythemia.  He reports symptoms of occasional headaches.  He does have history of migraines.  He also reports some  nonspecific vision problems.  He also reports weight loss of around 30 pounds during the past six months.  He denies any fevers or any lymph node enlargement.  He also reports some night sweats.  He does feel tired all the time.  He denies having any snoring, but he lives alone.  He does report some symptoms of excessive daytime sleepiness.  He smokes about 3-4 cigars per day.  He  denies having a CBC in the past.    ·  5/10/16 - Creatinine 0.87.  LFTs normal.  WBC 7.3, hemoglobin 16.7, MCV 94.5, platelet count  189,000.  Erythropoietin level 10.6.  JAK2 V617 mutations negative.  Exon 12 and exon 13 mutation  negative.   mutation negative.    ·  6/8/16 - Bone marrow biopsy:  Hemodiluted hypocellular aspirate.  However, flow cytometry is  adequate and it does not reveal any abnormal immunophenotype.  Cytogenetics shows normal  karyotype.  The clot section showed a single bone marrow particle.  There was no evidence of   ringed sideroblasts.    · 6/30/16 - WBC 8.7, hemoglobin 17.6, MCV 94, platelet count 166,000.  · 9/1/16 - WBC 9.4, hemoglobin 16.8, MCV 94.9, platelet count 204,000.  Patient underwent   phlebotomy.  ·  1/5/17 - Hemoglobin 16.5.  Patient underwent phlebotomy.  ·  1/9/17 - WBC 9.8, hemoglobin 15.3, platelet count 195,000.    · 5/12/17 - WBC 6.9, hemoglobin 15.9, platelet count 185,000, MCV 91.3.    · 9/15/17 - WBC 9.4, hemoglobin 16.2, platelet count 201,000, MCV 91.1.   · 12/6/17 - EGD:  Probable Matson’s esophagus. Antral gastritis.  Glandular mucosa with focal intestinal metaplasia, consistent with Matson’s.    ·  3/21/18 - Brain MRI with and without contrast:  A 2.3 cm structure along the superior margin of the cerebellum at the midline, consistent arachnoid cyst.       Subjective  Has had covid in June has had some weight loss. Has ongoing fatigue,      Past Medical History:   Diagnosis Date   • Anxiety    • Benign monoclonal gammopathy     dr. quiroz   • Colon polyp    • Depression    • Elevated liver enzymes 04/2020   • Erectile dysfunction    • GERD (gastroesophageal reflux disease)    • Headache    • Hypoglycemia    • Left sided abdominal pain    • Low back pain    • Migraine    • Nausea    • Pneumonia 03/2019   • Tremor    • Visual impairment     wears glasses       Past Surgical History:   Procedure Laterality Date   • COLONOSCOPY     • UPPER ENDOSCOPIC ULTRASOUND W/ FNA N/A 4/28/2020    Procedure: Esophagogastroduodenoscopy and endoscopic ultrasound with biopsy of liver, biopsy of esophagus;  Surgeon: Danilo Balderrama MD;  Location: [facility];  Service: Gastroenterology;  Laterality: N/A;  gastritis, hiatel hernia, barretts esophagus       Current Outpatient Medications:   •  colestipol (COLESTID) 1 g tablet, Take 1 tablet by mouth Daily. Sometimes takes 2 per day as instructed, Disp: , Rfl:   •  diphenhydrAMINE (BENADRYL) 25 MG tablet, Take 1 tablet by mouth Every 6 (Six) Hours As Needed., Disp: ,  Rfl:   •  diphenoxylate-atropine (LOMOTIL) 2.5-0.025 MG per tablet, TAKE 1 TABLET BY MOUTH EVERY 6 HOURS AS NEEDED FOR DIARRHEA, Disp: , Rfl:   •  ibuprofen (ADVIL,MOTRIN) 800 MG tablet, Take 1 tablet by mouth Every 8 (Eight) Hours. Stop now for procedure, Disp: , Rfl:   •  Loperamide HCl (IMODIUM PO), 1 dose Daily As Needed., Disp: , Rfl:   •  meclizine (ANTIVERT) 12.5 MG tablet, 1-2 tabs PO TID PRN dizziness, Disp: 60 tablet, Rfl: 1  •  Meth-Hyo-M Bl-Na Phos-Ph Sal (Uro-MP) 118 MG capsule, Take 1 capsule by mouth Every 8 (Eight) Hours., Disp: , Rfl:   •  montelukast (SINGULAIR) 10 MG tablet, Take 1 tablet by mouth Every Night., Disp: , Rfl:   •  omeprazole (priLOSEC) 40 MG capsule, Take 1 capsule by mouth Daily., Disp: , Rfl:   •  ondansetron ODT (ZOFRAN-ODT) 4 MG disintegrating tablet, DISSOLVE 1 TABLET UNDER THE TONGUE TWICE DAILY AS NEEDED FOR NAUSEA OR VOMITING, Disp: , Rfl:   •  pantoprazole (PROTONIX) 40 MG EC tablet, Take 1 tablet by mouth 2 (Two) Times a Day., Disp: , Rfl:   •  promethazine (PHENERGAN) 25 MG tablet, TAKE 1 TABLET BY MOUTH EVERY 4 TO 6 HOURS AS NEEDED FOR NAUSEA AND VOMITING, Disp: , Rfl:   •  sildenafil (REVATIO) 20 MG tablet, Take 1 tablet by mouth Daily As Needed. Do not use 24 hours prior to procedure, Disp: , Rfl: 99  •  sildenafil (VIAGRA) 100 MG tablet, Take 1 tablet by mouth., Disp: , Rfl:   •  sildenafil (VIAGRA) 25 MG tablet, Take 1 tablet by mouth See Admin Instructions., Disp: , Rfl:   •  Viberzi 100 MG tablet, Take 1 tablet by mouth 2 (Two) Times a Day., Disp: , Rfl:   •  Xifaxan 550 MG tablet, TAKE 1 TABLET BY MOUTH THREE TIMES DAILY FOR 14 DAYS FOR IRRITABLE BOWEL SYNDROME, Disp: , Rfl:     Allergies   Allergen Reactions   • Amoxicillin Unknown (See Comments)   • Erythromycin Hives   • Levofloxacin Hives   • Penicillin G Unknown (See Comments)   • Penicillins Other (See Comments)       Family History   Problem Relation Age of Onset   • Diabetes Mother    • Vision loss Mother   "  • COPD Paternal Aunt    • Vision loss Paternal Aunt    • Diabetes Maternal Grandmother    • Vision loss Maternal Grandmother    • Cancer Maternal Grandmother    • Heart disease Paternal Grandmother    • Stroke Paternal Grandmother    • Heart disease Paternal Grandfather    • Vision loss Paternal Grandfather    • Vision loss Father    • Vision loss Sister        Cancer-related family history includes Cancer in his maternal grandmother.    Social History     Tobacco Use   • Smoking status: Every Day     Packs/day: 1.00     Years: 17.00     Pack years: 17.00     Types: Cigarettes, Cigars, Electronic Cigarette     Start date: 8/2/2002   • Smokeless tobacco: Never   Substance Use Topics   • Alcohol use: Not Currently   • Drug use: Not Currently       ROS:     Objective:  Vital signs:  Vitals:    04/11/23 0923   BP: 130/88   Pulse: 88   Resp: 16   Temp: 97.8 °F (36.6 °C)   SpO2: 98%   Weight: 104 kg (229 lb 9.6 oz)   Height: 188 cm (74\")   PainSc:   2   PainLoc: Generalized     ECOG  (0) Fully active, able to carry on all predisease performance without restriction    Physical Exam:   Physical Exam   Constitutional: He is oriented to person, place, and time. He appears well-developed. No distress.   HENT:   Head: Normocephalic and atraumatic.   Eyes: Conjunctivae are normal.   Neck: No thyromegaly present.   Cardiovascular: Normal rate, regular rhythm, normal heart sounds and normal pulses. Exam reveals no gallop and no friction rub.   Pulmonary/Chest: Effort normal and breath sounds normal.   Slightly decreased air entry bilaterally   Abdominal: Soft. Normal appearance and bowel sounds are normal.   Musculoskeletal: Normal range of motion. No tenderness.      Comments: Healing surgical scar noted on the right knee   Lymphadenopathy:     He has no cervical adenopathy.   Neurological: He is alert and oriented to person, place, and time. He exhibits normal muscle tone.   Skin: Skin is warm. No rash noted. He is not " diaphoretic. No erythema.   Multiple tattoos   Psychiatric: His behavior is normal. Mood normal.       Lab Results - Last 18 Months   Lab Units 04/04/23  0815 12/13/22  0940 08/02/22 0823   WBC 10*3/mm3 8.18 6.77 5.76   HEMOGLOBIN g/dL 16.5 15.4 15.3   HEMATOCRIT % 46.1 42.7 42.2   PLATELETS 10*3/mm3 197 199 193   MCV fL 94.7 94.5 94.0     Lab Results - Last 18 Months   Lab Units 04/04/23  0815 12/13/22  0940 08/02/22 0823   SODIUM mmol/L 138 138 139   POTASSIUM mmol/L 3.6 4.0 4.1   CHLORIDE mmol/L 102 104 103   CO2 mmol/L 27.0 25.0 26.0   BUN mg/dL 11 9 8   CREATININE mg/dL 0.93 0.91 0.86   CALCIUM mg/dL 9.4 9.2 9.0   BILIRUBIN mg/dL 1.5* 1.2 0.8   ALK PHOS U/L 72 65 60   ALT (SGPT) U/L 36 41 34   AST (SGOT) U/L 23 28 26   GLUCOSE mg/dL 79 93 86       Lab Results   Component Value Date    GLUCOSE 79 04/04/2023    BUN 11 04/04/2023    CREATININE 0.93 04/04/2023    EGFRIFNONA 90 12/03/2021    BCR 11.8 04/04/2023    K 3.6 04/04/2023    CO2 27.0 04/04/2023    CALCIUM 9.4 04/04/2023    PROTENTOTREF 8.4 04/04/2023    ALBUMIN 4.5 04/04/2023    ALBUMIN 4.1 04/04/2023    LABIL2 1.0 04/04/2023    AST 23 04/04/2023    ALT 36 04/04/2023       No results for input(s): APTT, INR, PTT in the last 75310 hours.    Lab Results   Component Value Date    IRON 130 12/13/2019    TIBC 346 12/13/2019    FERRITIN 34.35 12/13/2019       No results found for: FOLATE    No results found for: OCCULTBLD    No results found for: RETICCTPCT    Lab Results   Component Value Date    INMVDJUE02 319 06/13/2019     Lab Results   Component Value Date    SPEP Comment 04/11/2022     No results found for: LDH, URICACID  Lab Results   Component Value Date    SEDRATE 3 11/05/2019     No results found for: FIBRINOGEN, HAPTOGLOBIN  Lab Results   Component Value Date    PTT 24.9 01/17/2020    INR 1.04 01/17/2020     No results found for:   No results found for: CEA  No components found for: CA-19-9  No results found for: PSA      Assessment & Plan      Assessment:  1.  High risk IgG kappa MGUS: Diagnosed November 2019.  High-intermediate risk.  PET CT scan on 8/21/2020 does not show any evidence of bone lesions..  No crab symptoms.  M protein was 1.8 initially,   Skeletal survey was negative.  He does not report any frequent infections..  Bone marrow biopsy shows about 5 to 10% plasma cells.  Cytogenetics showed normal karyotype.  They were unable to run the FISH panel.  Urine immunofixation is also positive.  M protein remains stable as of July 2021.  No longer M protein is slightly increased to 2.0.  CMP still unremarkable no elevated creatinine, no anemia no elevated calcium. In 8/2022-  Stable M protein at 2, stable flc, monitor for now. Repeat levels now with elevation of m protein 2.2, IgG elevated 2920, repeat in 3 months and follow up.   2. History of elevated liver enzymes: ALT is up to 193 and AST up to 602 as of 3/11/2020.  Patient is following with gastroenterology.   Status post liver biopsy which did not reveal any pathology.  Repeat LFTs have normalized.  Continue stay normal however T bili is up to 1.5, repeat now  3. History of secondary polycythemia: Seems resolved. Bone marrow biopsy was unremarkable.  JAK2 mutation and EPO  level do not suggest any evidence of polycythemia vera.  He has secondary polycythemia likely due  to tobacco use and from sleep apnea.  The patient continues to smoke.  Patient did not want to  have a sleep study.  He has not required phlebotomy for the last 3-4 years His hemoglobin has stabilized now  4. Fatigue: TSH cortisol WNL.   5. Vertigo : Takes Meclizine. Has seen ENT.  6. History of Matson’s esophagus.  He follows up with Dr. Hernandez.  Will need surveillance endoscopies. Has had 2 endoscopies he states at [facility].  increasing symptoms now. Will follow up with Dr. Hernandez.  7. History of tobacco use: has stopped smoking, he vapes    PLAN:  1. Repeat myeloma panel with worsening disease. Repeat in 3 months and if  continued increase, will need to repeat bone marrow biopsy  2. Continue follow-up with gastroenterology for Matson's esophagus, diarrhea,     There are no diagnoses linked to this encounter.  No orders of the defined types were placed in this encounter.

## 2023-04-11 ENCOUNTER — OFFICE VISIT (OUTPATIENT)
Dept: ONCOLOGY | Facility: CLINIC | Age: 38
End: 2023-04-11
Payer: COMMERCIAL

## 2023-04-11 ENCOUNTER — LAB (OUTPATIENT)
Dept: LAB | Facility: HOSPITAL | Age: 38
End: 2023-04-11
Payer: COMMERCIAL

## 2023-04-11 VITALS
TEMPERATURE: 97.8 F | DIASTOLIC BLOOD PRESSURE: 88 MMHG | BODY MASS INDEX: 29.46 KG/M2 | WEIGHT: 229.6 LBS | OXYGEN SATURATION: 98 % | HEIGHT: 74 IN | RESPIRATION RATE: 16 BRPM | SYSTOLIC BLOOD PRESSURE: 130 MMHG | HEART RATE: 88 BPM

## 2023-04-11 DIAGNOSIS — D47.2 MGUS (MONOCLONAL GAMMOPATHY OF UNKNOWN SIGNIFICANCE): Primary | ICD-10-CM

## 2023-04-11 LAB
ALBUMIN SERPL-MCNC: 4.3 G/DL (ref 3.5–5.2)
ALBUMIN/GLOB SERPL: 1.1 G/DL
ALP SERPL-CCNC: 69 U/L (ref 39–117)
ALT SERPL W P-5'-P-CCNC: 25 U/L (ref 1–41)
ANION GAP SERPL CALCULATED.3IONS-SCNC: 8 MMOL/L (ref 5–15)
AST SERPL-CCNC: 20 U/L (ref 1–40)
BILIRUB SERPL-MCNC: 1.2 MG/DL (ref 0–1.2)
BUN SERPL-MCNC: 8 MG/DL (ref 6–20)
BUN/CREAT SERPL: 8.9 (ref 7–25)
CALCIUM SPEC-SCNC: 9.5 MG/DL (ref 8.6–10.5)
CHLORIDE SERPL-SCNC: 103 MMOL/L (ref 98–107)
CO2 SERPL-SCNC: 26 MMOL/L (ref 22–29)
CREAT SERPL-MCNC: 0.9 MG/DL (ref 0.76–1.27)
EGFRCR SERPLBLD CKD-EPI 2021: 112.1 ML/MIN/1.73
GLOBULIN UR ELPH-MCNC: 4 GM/DL
GLUCOSE SERPL-MCNC: 143 MG/DL (ref 65–99)
POTASSIUM SERPL-SCNC: 4.2 MMOL/L (ref 3.5–5.2)
PROT SERPL-MCNC: 8.3 G/DL (ref 6–8.5)
SODIUM SERPL-SCNC: 137 MMOL/L (ref 136–145)
WHOLE BLOOD HOLD SPECIMEN: NORMAL

## 2023-04-11 PROCEDURE — 80053 COMPREHEN METABOLIC PANEL: CPT | Performed by: INTERNAL MEDICINE

## 2023-04-11 PROCEDURE — 36415 COLL VENOUS BLD VENIPUNCTURE: CPT

## 2023-04-11 PROCEDURE — 99214 OFFICE O/P EST MOD 30 MIN: CPT | Performed by: INTERNAL MEDICINE

## 2023-08-22 ENCOUNTER — OFFICE (AMBULATORY)
Dept: URBAN - METROPOLITAN AREA CLINIC 64 | Facility: CLINIC | Age: 38
End: 2023-08-22

## 2023-08-22 VITALS
WEIGHT: 225 LBS | SYSTOLIC BLOOD PRESSURE: 118 MMHG | HEIGHT: 74 IN | DIASTOLIC BLOOD PRESSURE: 80 MMHG | HEART RATE: 65 BPM

## 2023-08-22 DIAGNOSIS — K22.70 BARRETT'S ESOPHAGUS WITHOUT DYSPLASIA: ICD-10-CM

## 2023-08-22 DIAGNOSIS — R94.5 ABNORMAL RESULTS OF LIVER FUNCTION STUDIES: ICD-10-CM

## 2023-08-22 DIAGNOSIS — K59.1 FUNCTIONAL DIARRHEA: ICD-10-CM

## 2023-08-22 PROCEDURE — 99213 OFFICE O/P EST LOW 20 MIN: CPT | Performed by: INTERNAL MEDICINE

## 2023-08-22 RX ORDER — OMEPRAZOLE 40 MG/1
80 CAPSULE, DELAYED RELEASE ORAL
Qty: 180 | Refills: 3 | Status: ACTIVE
Start: 2022-09-14

## 2023-09-22 NOTE — PROGRESS NOTES
HEMATOLOGY ONCOLOGY FOLLOW UP        Patient name: Eleazar Jeong  : 1985  MRN: [id number]  Primary Care Physician: Tierra Payan PA  Referring Physician: Tierra Payan PA  Reason For Consult:     Chief Complaint   Patient presents with   • Follow-up     Monoclonal gammopathy   Monoclonal gammopathy  History of polycythemia    History of Present Illness:    1. IgG kappa monoclonal gammopathy:    2018 to 2019 patient has not followed up in our office.  Patient was referred back by MARTIN Lacey due to IgG kappa monoclonal gammopathy  · 2019: Serum protein electrophoresis shows M protein 1.8 g/dL  · 2019 IgA 116, IgG 2206 high, IgM 55, serum immunofixation shows IgG kappa monoclonal gammopathy.  · 2019 creatinine 0.84, BUN 12,  high, ALT 70, iron 130, ferritin 34.3, iron saturation 38%, TIBC 346  WBC 6.7, hemoglobin 14.9, platelets 184, MCV 93.7   · 2019: Skeletal survey: No evidence of multiple myeloma.  No lytic or blastic lesions.  · 2019  SPEP M protein 1.38 free kappa light chains 37.6 high, Urine protein electrophoresis M spike 111.7, M spike to 3.38.,  24-hour urine protein is 260, urine immunofixation shows free kappa paraprotein peak and also a small IgG kappa paraprotein peak.  · 2019 calcium 9.5, creatinine 0.8, ALT 65 high, AST 57 high, hepatitis panel negative,  · 2019 Skeletal survey: No evidence of bone lytic lesions.  · 2019:UPEP shows to M spike's 4.5% and 1.3%.  24-hour urine protein to 60 high,  · 2020: WBC 9.2, hemoglobin 17.5, MCV 96.1, platelets 201  · 2020 Bone marrow aspiration and biopsy: Atypical marrow plasmacytosis 5 to 10%, consistent with plasma cell neoplasm.  Normocellular to focally mildly hypocellular marrow for age, 40 to 50%, with trilineage hematopoiesis.  No increase in reticulin fibrosis.  Storage iron present.  Flow cytometry: Clonal plasma cell population detected  1% of analyzed cells, with aberrant expression of  and cytoplasmic kappa light chain restriction.,  Normal cytogenetics 46 XY  · 3/11/2020 IgG 2104, IgM 51, IgA 101, SPEP shows M protein 1.7  . Free kappa light chains 48.1, free lambda light chain 6.9, kappa lambda ratio 6.97 high, WBC 7.8, hemoglobin 14.9, platelets 206, MCV 93.6  · 3/11/2020:  high,  high, albumin 4.3, alk phos 56, bilirubin 0.6  · 3/18/2020 WBC 7.7, hemoglobin 16, MCV 94, platelets 202,  · 3/18/2020: IgG 2304 high, IgM 53, IgA 108, free kappa light chains 55.8 high, free lambda light chains 8.2, kappa lambda ratio 6.8 high  · 4/28/2020: Liver biopsy: Benign liver with no significant histopathology.  No evidence of malignancy.  Esophageal biopsy shows glandular mucosa with intestinal metaplasia consistent with Matson's esophagus.  Negative for dysplasia.   · 6/3/2020: CMP normal, WBC 6.9, hemoglobin 14.9, platelets 220, MCV 95.2      2.  History of polycythemia:  Patient presented to PCP Dr. Ann’s office on 4/11/16 that showed hemoglobin of 18.3.  MCV was  95.0.  He is referred to us for polycythemia.  The patient has a history of chronic anxiety and   depression.   · 4/11/16 - WBC 9.2, hemoglobin 18.3, MCV 95.0, platelet count 182,000.  Differential normal.    Vitamin B12 554.  · 5/10/2016, the patient presents for initial consultation for polycythemia.  He reports symptoms of occasional headaches.  He does have history of migraines.  He also reports some  nonspecific vision problems.  He also reports weight loss of around 30 pounds during the past six months.  He denies any fevers or any lymph node enlargement.  He also reports some night sweats.  He does feel tired all the time.  He denies having any snoring, but he lives alone.  He does report some symptoms of excessive daytime sleepiness.  He smokes about 3-4 cigars per day.  He  denies having a CBC in the past.    ·  5/10/16 - Creatinine 0.87.  LFTs normal.  WBC 7.3,  hemoglobin 16.7, MCV 94.5, platelet count  189,000.  Erythropoietin level 10.6.  JAK2 V617 mutations negative.  Exon 12 and exon 13 mutation  negative.   mutation negative.    ·  6/8/16 - Bone marrow biopsy:  Hemodiluted hypocellular aspirate.  However, flow cytometry is  adequate and it does not reveal any abnormal immunophenotype.  Cytogenetics shows normal  karyotype.  The clot section showed a single bone marrow particle.  There was no evidence of  ringed sideroblasts.    · 6/30/16 - WBC 8.7, hemoglobin 17.6, MCV 94, platelet count 166,000.  · 9/1/16 - WBC 9.4, hemoglobin 16.8, MCV 94.9, platelet count 204,000.  Patient underwent   phlebotomy.  ·  1/5/17 - Hemoglobin 16.5.  Patient underwent phlebotomy.  ·  1/9/17 - WBC 9.8, hemoglobin 15.3, platelet count 195,000.    · 5/12/17 - WBC 6.9, hemoglobin 15.9, platelet count 185,000, MCV 91.3.    · 9/15/17 - WBC 9.4, hemoglobin 16.2, platelet count 201,000, MCV 91.1.   · 12/6/17 - EGD:  Probable Matson’s esophagus. Antral gastritis.  Glandular mucosa with focal intestinal metaplasia, consistent with Matson’s.    ·  3/21/18 - Brain MRI with and without contrast:  A 2.3 cm structure along the superior margin of the cerebellum at the midline, consistent arachnoid cyst.       Subjective  Patient  presents for a follow up of IgG kappa monoclonal gammopathy. He continues to smoke, and has not tried to quit. He reports fatigue. Patient underwent CT guided bone marrow biopsy on 01/17/2020. He follows up with Dr. Dumas at [facility] in [place]. He underwent a liver biopsy performed on 04/28/2020.  Patient does not report any frequent infections.  No new complaints.    The following portions of the patient's history were reviewed and updated as appropriate: allergies, current medications, past family history, past medical history, past social history, past surgical history and problem list.       Past Medical History:   Diagnosis Date   • Anxiety    • Benign monoclonal  gammopathy     dr. quiroz   • Colon polyp    • Depression    • Elevated liver enzymes 04/2020   • Erectile dysfunction    • GERD (gastroesophageal reflux disease)    • Headache    • Hypoglycemia    • Left sided abdominal pain    • Low back pain    • Migraine    • Nausea    • Pneumonia 03/2019   • Tremor    • Visual impairment     wears glasses       Past Surgical History:   Procedure Laterality Date   • COLONOSCOPY     • UPPER ENDOSCOPIC ULTRASOUND W/ FNA N/A 4/28/2020    Procedure: Esophagogastroduodenoscopy and endoscopic ultrasound with biopsy of liver, biopsy of esophagus;  Surgeon: Danilo Balderrama MD;  Location: [facility] ENDOSCOPY;  Service: Gastroenterology;  Laterality: N/A;  gastritis, hiatel hernia, barretts esophagus         Current Outpatient Medications:   •  colestipol (COLESTID) 1 g tablet, Take 1 g by mouth Daily. Sometimes takes 2 per day as instructed, Disp: , Rfl:   •  diphenhydrAMINE (BENADRYL ALLERGY) 25 MG tablet, Take 25 mg by mouth Every 6 (Six) Hours As Needed., Disp: , Rfl:   •  ibuprofen (ADVIL,MOTRIN) 800 MG tablet, Take 800 mg by mouth Every 8 (Eight) Hours. Stop now for procedure, Disp: , Rfl:   •  Loperamide HCl (IMODIUM PO), 1 dose Daily As Needed., Disp: , Rfl:   •  meclizine (ANTIVERT) 12.5 MG tablet, 1-2 tabs PO TID PRN dizziness, Disp: 60 tablet, Rfl: 1  •  omeprazole (priLOSEC) 40 MG capsule, Take 40 mg by mouth Daily., Disp: , Rfl:   •  sildenafil (REVATIO) 20 MG tablet, Take 20 mg by mouth Daily As Needed. Do not use 24 hours prior to procedure, Disp: , Rfl: 99    Allergies   Allergen Reactions   • Amoxicillin Unknown (See Comments)   • Erythromycin Hives   • Levofloxacin Hives   • Penicillin G Unknown (See Comments)   • Penicillins Other (See Comments)       Family History   Problem Relation Age of Onset   • Diabetes Mother    • Vision loss Mother    • COPD Paternal Aunt    • Vision loss Paternal Aunt    • Diabetes Maternal Grandmother    • Vision loss Maternal Grandmother    •  "Cancer Maternal Grandmother    • Heart disease Paternal Grandmother    • Stroke Paternal Grandmother    • Heart disease Paternal Grandfather    • Vision loss Paternal Grandfather    • Vision loss Father    • Vision loss Sister        Cancer-related family history includes Cancer in his maternal grandmother.    Social History     Tobacco Use   • Smoking status: Current Every Day Smoker     Packs/day: 1.00     Years: 17.00     Pack years: 17.00     Types: Cigarettes, Cigars, Electronic Cigarette     Start date: 8/2/2002   • Smokeless tobacco: Never Used   Substance Use Topics   • Alcohol use: No     Frequency: Never   • Drug use: No         I have reviewed the history of present illness, past medical history, family history, social history, lab results, all notes and other records since the patient was last seen on  11/27/2019.    ROS:   Review of Systems   Constitutional: Positive for fatigue. Negative for activity change, chills and fever.   HENT: Negative for ear pain, mouth sores, nosebleeds and sore throat.    Eyes: Negative for photophobia and visual disturbance.   Respiratory: Negative for wheezing and stridor.    Cardiovascular: Negative for chest pain and palpitations.   Gastrointestinal: Positive for constipation (states it is chronic and he is, \"used to it\"). Negative for abdominal pain, diarrhea, nausea and vomiting.   Endocrine: Negative for cold intolerance and heat intolerance.   Genitourinary: Negative for dysuria and hematuria.   Musculoskeletal: Negative for joint swelling and neck stiffness.   Skin: Negative for color change and rash.   Neurological: Negative for dizziness (vertigo lately), seizures and syncope.   Hematological: Negative for adenopathy.        No obvious bleeding   Psychiatric/Behavioral: Negative for agitation, confusion and hallucinations.       Objective:  Vital signs:  Vitals:    06/10/20 0925   BP: 145/81   Pulse: 73   Resp: 16   Temp: 98.6 °F (37 °C)   Weight: 97.3 kg (214 lb " "9.6 oz)   Height: 188 cm (74\")   PainSc: 0-No pain     ECOG  (0) Fully active, able to carry on all predisease performance without restriction    Physical Exam:   Physical Exam   Constitutional: He is oriented to person, place, and time. He appears well-developed and well-nourished. No distress.   HENT:   Head: Normocephalic and atraumatic.   Poor dentition   Eyes: Conjunctivae and EOM are normal. Right eye exhibits no discharge. Left eye exhibits no discharge. No scleral icterus.   Wears glasses   Neck: Normal range of motion. Neck supple. No thyromegaly present.   Cardiovascular: Normal rate, regular rhythm and normal heart sounds. Exam reveals no gallop and no friction rub.   Pulmonary/Chest: Effort normal. No stridor. No respiratory distress. He has no wheezes.   Slightly decreased air entry bilaterally   Abdominal: Soft. Bowel sounds are normal. He exhibits no mass. There is no tenderness. There is no rebound and no guarding.   Musculoskeletal: Normal range of motion. He exhibits no tenderness.   Lymphadenopathy:     He has no cervical adenopathy.   Neurological: He is alert and oriented to person, place, and time. He exhibits normal muscle tone.   Skin: Skin is warm. No rash noted. He is not diaphoretic. No erythema.   Multiple tattoos   Psychiatric: He has a normal mood and affect. His behavior is normal.   Nursing note and vitals reviewed.            Lab Results - Last 18 Months   Lab Units 06/10/20  0908 06/03/20  0913 03/18/20  1039   WBC 10*3/mm3 8.39 6.96 7.70   HEMOGLOBIN g/dL 15.1 14.9 16.0   HEMATOCRIT % 42.1 41.8 44.1   PLATELETS 10*3/mm3 212 220 202   MCV fL 94.8 95.2 94.0     Lab Results - Last 18 Months   Lab Units 06/03/20  0913 03/18/20  1039 03/11/20  0826   SODIUM mmol/L 136 138 140   POTASSIUM mmol/L 3.6 4.4 3.6   CHLORIDE mmol/L 103 101 104   CO2 mmol/L 23.0 26.0 26.0   BUN mg/dL 14 12 12   CREATININE mg/dL 1.03 0.97 0.92   CALCIUM mg/dL 9.2 9.5 8.8   BILIRUBIN mg/dL 0.6 0.6 0.6   ALK PHOS " U/L 61 60 56   ALT (SGPT) U/L 33 96* 193*   AST (SGOT) U/L 24 89* 602*   GLUCOSE mg/dL 119* 95 112*       Lab Results   Component Value Date    GLUCOSE 119 (H) 06/03/2020    BUN 14 06/03/2020    CREATININE 1.03 06/03/2020    EGFRIFNONA 82 06/03/2020    BCR 13.6 06/03/2020    K 3.6 06/03/2020    CO2 23.0 06/03/2020    CALCIUM 9.2 06/03/2020    PROTENTOTREF 7.8 03/18/2020    ALBUMIN 4.40 06/03/2020    LABIL2 1.0 03/18/2020    AST 24 06/03/2020    ALT 33 06/03/2020       Lab Results - Last 18 Months   Lab Units 01/17/20  0808   INR  1.04   APTT seconds 24.9       Lab Results   Component Value Date    IRON 130 12/13/2019    TIBC 346 12/13/2019    FERRITIN 34.35 12/13/2019       No results found for: FOLATE    No results found for: OCCULTBLD    No results found for: RETICCTPCT    Lab Results   Component Value Date    URGOATWS72 319 06/13/2019     Lab Results   Component Value Date    SPEP Comment 03/18/2020     No results found for: LDH, URICACID  Lab Results   Component Value Date    SEDRATE 3 11/05/2019     No results found for: FIBRINOGEN, HAPTOGLOBIN  Lab Results   Component Value Date    PTT 24.9 01/17/2020    INR 1.04 01/17/2020     No results found for:   No results found for: CEA  No components found for: CA-19-9  No results found for: PSA      Assessment/Plan     Assessment:  1.  IgG kappa monoclonal gammopathy: Diagnosis is  MGUS, diagnosed in November 2019..  M protein was 1.8 initially, but more recently it was around 1.7.  Skeletal survey was negative.  He does not report any frequent infections., Does not have any renal failure, bone fractures, anemia etc..  Bone marrow biopsy shows about 5 to 10% plasma cells.  Cytogenetics showed normal karyotype..  Urine immunofixation is also positive.  M protein remains stable as of March 2020, and he does not report any frequent major infections.  2. Elevated liver enzymes: ALT is up to 193 and AST up to 602 as of 3/11/2020.  Patient is following with  gastroenterology.  .  Status post liver biopsy which did not reveal any pathology.  3. History of secondary polycythemia: Seems resolved. Bone marrow biopsy was unremarkable.  JAK2 mutation and EPO  level do not suggest any evidence of polycythemia vera.  He has secondary polycythemia likely due  to tobacco use and from sleep apnea.  The patient continues to smoke.  Patient did not want to  have a sleep study.  He has not required phlebotomy for the last 3 years His hemoglobin has stabilized.  Unfortunately he continues to smoke.   4. History of Matson’s esophagus.  He follows up with Dr. Hernandez.  Will need surveillance endoscopies.  5. History of tobacco use: Smoking cessation counseling was provided.       PLAN:    1. We will have them run myeloma FISH panel on the bone marrow.  2.  SPEP, free light chain assay, CBC CMP in 3 months  3. Patient not interested in smoking cessation.  4. Continue follow-up with gastroenterology for increased liver enzymes and Matson's esophagus  5. Will need repeat endoscopies for his Matson's esophagus.  6. We will follow patient back in 3 months with repeat myeloma labs..          I counselled Eleazar of the risks of continuing to use tobacco and cessation.    During this visit, I spent 3-10 minutes counseling the patient regarding tobacco cessation.         Monoclonal gammopathy  - CBC & Differential  - CBC Auto Differential    Polycythemia  - CBC & Differential  - CBC Auto Differential    Orders Placed This Encounter   Procedures   • CBC Auto Differential   • CBC & Differential                   Thank you very much for providing the opportunity to participate in this patient’s care. Please do not hesitate to call if there are any other questions.    I have reviewed all relevant labs results, outside reports, imaging, notes, vital signs, medications, and plan with the patient today.    Portions of the note have been scribed by medical assistant/Nurse.  I have reviewed and made  changes accordingly.         Electronically signed by Shabbir Preciado MD, 06/10/20, 9:39 AM.        Cellcept Pregnancy And Lactation Text: This medication is Pregnancy Category D and isn't considered safe during pregnancy. It is unknown if this medication is excreted in breast milk.

## 2024-01-17 ENCOUNTER — LAB (OUTPATIENT)
Dept: LAB | Facility: HOSPITAL | Age: 39
End: 2024-01-17
Payer: COMMERCIAL

## 2024-01-17 DIAGNOSIS — D47.2 MGUS (MONOCLONAL GAMMOPATHY OF UNKNOWN SIGNIFICANCE): ICD-10-CM

## 2024-01-17 LAB
ALBUMIN SERPL-MCNC: 4.7 G/DL (ref 3.5–5.2)
ALBUMIN/GLOB SERPL: 1.1 G/DL
ALP SERPL-CCNC: 68 U/L (ref 39–117)
ALT SERPL W P-5'-P-CCNC: 58 U/L (ref 1–41)
ANION GAP SERPL CALCULATED.3IONS-SCNC: 10 MMOL/L (ref 5–15)
AST SERPL-CCNC: 34 U/L (ref 1–40)
BASOPHILS # BLD AUTO: 0.04 10*3/MM3 (ref 0–0.2)
BASOPHILS NFR BLD AUTO: 0.4 % (ref 0–1.5)
BILIRUB SERPL-MCNC: 1.2 MG/DL (ref 0–1.2)
BUN SERPL-MCNC: 17 MG/DL (ref 6–20)
BUN/CREAT SERPL: 16.8 (ref 7–25)
CALCIUM SPEC-SCNC: 9.7 MG/DL (ref 8.6–10.5)
CHLORIDE SERPL-SCNC: 100 MMOL/L (ref 98–107)
CO2 SERPL-SCNC: 26 MMOL/L (ref 22–29)
CREAT SERPL-MCNC: 1.01 MG/DL (ref 0.76–1.27)
DEPRECATED RDW RBC AUTO: 47 FL (ref 37–54)
EGFRCR SERPLBLD CKD-EPI 2021: 97.6 ML/MIN/1.73
EOSINOPHIL # BLD AUTO: 0.18 10*3/MM3 (ref 0–0.4)
EOSINOPHIL NFR BLD AUTO: 2 % (ref 0.3–6.2)
ERYTHROCYTE [DISTWIDTH] IN BLOOD BY AUTOMATED COUNT: 13.9 % (ref 12.3–15.4)
GLOBULIN UR ELPH-MCNC: 4.3 GM/DL
GLUCOSE SERPL-MCNC: 129 MG/DL (ref 65–99)
HCT VFR BLD AUTO: 49 % (ref 37.5–51)
HGB BLD-MCNC: 17.4 G/DL (ref 13–17.7)
LYMPHOCYTES # BLD AUTO: 2.74 10*3/MM3 (ref 0.7–3.1)
LYMPHOCYTES NFR BLD AUTO: 29.9 % (ref 19.6–45.3)
MCH RBC QN AUTO: 34 PG (ref 26.6–33)
MCHC RBC AUTO-ENTMCNC: 35.5 G/DL (ref 31.5–35.7)
MCV RBC AUTO: 95.7 FL (ref 79–97)
MONOCYTES # BLD AUTO: 0.99 10*3/MM3 (ref 0.1–0.9)
MONOCYTES NFR BLD AUTO: 10.8 % (ref 5–12)
NEUTROPHILS NFR BLD AUTO: 5.22 10*3/MM3 (ref 1.7–7)
NEUTROPHILS NFR BLD AUTO: 56.9 % (ref 42.7–76)
PLATELET # BLD AUTO: 220 10*3/MM3 (ref 140–450)
PMV BLD AUTO: 10.8 FL (ref 6–12)
POTASSIUM SERPL-SCNC: 3.9 MMOL/L (ref 3.5–5.2)
PROT SERPL-MCNC: 9 G/DL (ref 6–8.5)
RBC # BLD AUTO: 5.12 10*6/MM3 (ref 4.14–5.8)
SODIUM SERPL-SCNC: 136 MMOL/L (ref 136–145)
WBC NRBC COR # BLD AUTO: 9.17 10*3/MM3 (ref 3.4–10.8)

## 2024-01-17 PROCEDURE — 36415 COLL VENOUS BLD VENIPUNCTURE: CPT

## 2024-01-17 PROCEDURE — 80053 COMPREHEN METABOLIC PANEL: CPT | Performed by: INTERNAL MEDICINE

## 2024-01-17 PROCEDURE — 85025 COMPLETE CBC W/AUTO DIFF WBC: CPT

## 2024-01-17 NOTE — TELEPHONE ENCOUNTER
Left message for the pt to return call to our office for the message below from Dr. Goddard about her colonoscopy prep    The prep would best be done per letter; review the most recent letters to assure the timings are directly correctly.  If she prefers to go to a closer site, [address] [place] [address] or [facility] are options, o/w [place].     If / when pt returns call please relay the information.   Patient inquiring if he is needing to fast for his bw

## 2024-01-18 LAB
ALBUMIN SERPL ELPH-MCNC: 4.6 G/DL (ref 2.9–4.4)
ALBUMIN/GLOB SERPL: 1.1 {RATIO} (ref 0.7–1.7)
ALPHA1 GLOB SERPL ELPH-MCNC: 0.2 G/DL (ref 0–0.4)
ALPHA2 GLOB SERPL ELPH-MCNC: 0.6 G/DL (ref 0.4–1)
B-GLOBULIN SERPL ELPH-MCNC: 1 G/DL (ref 0.7–1.3)
GAMMA GLOB SERPL ELPH-MCNC: 2.4 G/DL (ref 0.4–1.8)
GLOBULIN SER-MCNC: 4.3 G/DL (ref 2.2–3.9)
IGA SERPL-MCNC: 94 MG/DL (ref 90–386)
IGG SERPL-MCNC: 2968 MG/DL (ref 603–1613)
IGM SERPL-MCNC: 51 MG/DL (ref 20–172)
INTERPRETATION SERPL IEP-IMP: ABNORMAL
KAPPA LC FREE SER-MCNC: 79.4 MG/L (ref 3.3–19.4)
KAPPA LC FREE/LAMBDA FREE SER: 9.68 {RATIO} (ref 0.26–1.65)
LABORATORY COMMENT REPORT: ABNORMAL
LAMBDA LC FREE SERPL-MCNC: 8.2 MG/L (ref 5.7–26.3)
M PROTEIN SERPL ELPH-MCNC: 2.1 G/DL
PROT SERPL-MCNC: 8.9 G/DL (ref 6–8.5)

## 2024-01-23 NOTE — PROGRESS NOTES
HEMATOLOGY ONCOLOGY FOLLOW UP        Patient name: Eleazar Jeong  : 1985  MRN: [id number]  Primary Care Physician: Ghislaine Wilks APRN  Referring Physician: Ghislaine Wilks APRN  Reason For Consult:     No chief complaint on file.  Monoclonal gammopathy  History of polycythemia    History of Present Illness:    1. IgG kappa monoclonal gammopathy:    2018 to 2019 patient has not followed up in our office.  Patient was referred back by MARTIN Lacey due to IgG kappa monoclonal gammopathy  2019: Serum protein electrophoresis shows M protein 1.8 g/dL  2019 IgA 116, IgG 2206 high, IgM 55, serum immunofixation shows IgG kappa monoclonal gammopathy.  2019 creatinine 0.84, BUN 12,  high, ALT 70, iron 130, ferritin 34.3, iron saturation 38%, TIBC 346  WBC 6.7, hemoglobin 14.9, platelets 184, MCV 93.7   2019: Skeletal survey: No evidence of multiple myeloma.  No lytic or blastic lesions.  2019  SPEP M protein 1.38 free kappa light chains 37.6 high, Urine protein electrophoresis M spike 111.7, M spike to 3.38.,  24-hour urine protein is 260, urine immunofixation shows free kappa paraprotein peak and also a small IgG kappa paraprotein peak.  2019 calcium 9.5, creatinine 0.8, ALT 65 high, AST 57 high, hepatitis panel negative,  2019 Skeletal survey: No evidence of bone lytic lesions.  2019:UPEP shows to M spike's 4.5% and 1.3%.  24-hour urine protein to 60 high,  2020: WBC 9.2, hemoglobin 17.5, MCV 96.1, platelets 201  2020 Bone marrow aspiration and biopsy: Atypical marrow plasmacytosis 5 to 10%, consistent with plasma cell neoplasm.  Normocellular to focally mildly hypocellular marrow for age, 40 to 50%, with trilineage hematopoiesis.  No increase in reticulin fibrosis.  Storage iron present.  Flow cytometry: Clonal plasma cell population detected 1% of analyzed cells, with aberrant expression of  and cytoplasmic kappa  light chain restriction.,  Normal cytogenetics 46 XY  3/11/2020 IgG 2104, IgM 51, IgA 101, SPEP shows M protein 1.7  . Free kappa light chains 48.1, free lambda light chain 6.9, kappa lambda ratio 6.97 high, WBC 7.8, hemoglobin 14.9, platelets 206, MCV 93.6  3/11/2020:  high,  high, albumin 4.3, alk phos 56, bilirubin 0.6  3/18/2020 WBC 7.7, hemoglobin 16, MCV 94, platelets 202,  3/18/2020: IgG 2304 high, IgM 53, IgA 108, free kappa light chains 55.8 high, free lambda light chains 8.2, kappa lambda ratio 6.8 high  4/28/2020: Liver biopsy: Benign liver with no significant histopathology.  No evidence of malignancy.  Esophageal biopsy shows glandular mucosa with intestinal metaplasia consistent with Matson's esophagus.  Negative for dysplasia.   6/3/2020: CMP normal, WBC 6.9, hemoglobin 14.9, platelets 220, MCV 95.2  6/10/2020: WBC 8.39, hemoglobin 15.1, platelets 212, SPEP shows M protein 1.8 g/dL.,  Free kappa light chains 51.2 high, free lambda light chains 11.3, kappa lambda ratio 4.53 high, IgG 2242 high,  8/6/2020: WBC 8, hemoglobin 16.1, platelets 186, IgA 94, IgG 1940 high, IgM 54 low, free kappa light chains 3695 high, free lambda light chain 6.75, kappa lambda ratio 5.47 high,Calcium 9.3, AST ALT normal, albumin 4.2, TSH 0.6, total testosterone 482, SPEP shows M protein 1.55,  8/20/2020: CTs soft tissue neck with and without contrast: No acute process seen in the neck.  No abnormal soft tissue mass or fluid collection.  No cervical adenopathy.  8/21/2020: PET CT scan: No convincing hypermetabolic osseous malignancy.  Right prepatellar soft tissue thickening and fluid with diffuse mild metabolic activity suggestive of inflammatory process.    9/2/2020:SPEP shows M protein 1.8 g/dL, creatinine 0.94, LFTs normal, creatinine 0.94, LFTs normal, WBC 6.9, hemoglobin 16, platelets 202, MCV 92.1  12/8/2020: CMP normal, hemoglobin 17, platelets 226, SPEP shows M protein 1.6, IgG 2685 high, IgA 116, IgM  69, kappa lambda ratio 5.46 high,  2/9/2021: WBC 7.4, hemoglobin 16.7, platelets 231, MCV 95.1, CMP normal,  12/3/2021: M spike 2.0, free light chain ratio 6.32, kappa free light chains 43.6, IgG up to 2552.  8/2022: M spike 2.0, IgG 2809, kappa 47.2, lambda 7.3, k/l 6.47  4/4/2023 - M spike 2.2, IgG 2920 k 61.1, k/l 8.61  7/2023 - M spike 2.1, rest of the labs stable.  1/17/24 - M spike 2.1, IgG 2968 k/l ratio stable      2.  History of polycythemia:  Patient presented to PCP Dr. Ann’s office on 4/11/16 that showed hemoglobin of 18.3.  MCV was  95.0.  He is referred to us for polycythemia.  The patient has a history of chronic anxiety and   depression.   · 4/11/16 - WBC 9.2, hemoglobin 18.3, MCV 95.0, platelet count 182,000.  Differential normal.    Vitamin B12 554.  5/10/2016, the patient presents for initial consultation for polycythemia.  He reports symptoms of occasional headaches.  He does have history of migraines.  He also reports some  nonspecific vision problems.  He also reports weight loss of around 30 pounds during the past six months.  He denies any fevers or any lymph node enlargement.  He also reports some night sweats.  He does feel tired all the time.  He denies having any snoring, but he lives alone.  He does report some symptoms of excessive daytime sleepiness.  He smokes about 3-4 cigars per day.  He  denies having a CBC in the past.     5/10/16 - Creatinine 0.87.  LFTs normal.  WBC 7.3, hemoglobin 16.7, MCV 94.5, platelet count  189,000.  Erythropoietin level 10.6.  JAK2 V617 mutations negative.  Exon 12 and exon 13 mutation  negative.   mutation negative.     6/8/16 - Bone marrow biopsy:  Hemodiluted hypocellular aspirate.  However, flow cytometry is  adequate and it does not reveal any abnormal immunophenotype.  Cytogenetics shows normal  karyotype.  The clot section showed a single bone marrow particle.  There was no evidence of  ringed sideroblasts.    6/30/16 - WBC 8.7,  hemoglobin 17.6, MCV 94, platelet count 166,000.  9/1/16 - WBC 9.4, hemoglobin 16.8, MCV 94.9, platelet count 204,000.  Patient underwent   phlebotomy.   1/5/17 - Hemoglobin 16.5.  Patient underwent phlebotomy.   1/9/17 - WBC 9.8, hemoglobin 15.3, platelet count 195,000.    5/12/17 - WBC 6.9, hemoglobin 15.9, platelet count 185,000, MCV 91.3.    9/15/17 - WBC 9.4, hemoglobin 16.2, platelet count 201,000, MCV 91.1.   12/6/17 - EGD:  Probable Matson’s esophagus. Antral gastritis.  Glandular mucosa with focal intestinal metaplasia, consistent with Matson’s.     3/21/18 - Brain MRI with and without contrast:  A 2.3 cm structure along the superior margin of the cerebellum at the midline, consistent arachnoid cyst.       Subjective  No new symptoms has ongoing dizziness, atypical chest pain.       Past Medical History:   Diagnosis Date    Anxiety     Benign monoclonal gammopathy     dr. [email or web address]    Colon polyp     Depression     Elevated liver enzymes 04/2020    Erectile dysfunction     GERD (gastroesophageal reflux disease)     Headache     Hypoglycemia     Left sided abdominal pain     Low back pain     Migraine     Nausea     Pneumonia 03/2019    Tremor     Visual impairment     wears glasses       Past Surgical History:   Procedure Laterality Date    COLONOSCOPY      UPPER ENDOSCOPIC ULTRASOUND W/ FNA N/A 4/28/2020    Procedure: Esophagogastroduodenoscopy and endoscopic ultrasound with biopsy of liver, biopsy of esophagus;  Surgeon: Danilo Balderrama MD;  Location: [facility] ENDOSCOPY;  Service: Gastroenterology;  Laterality: N/A;  gastritis, hiatel hernia, barretts esophagus       Current Outpatient Medications:     colestipol (COLESTID) 1 g tablet, Take 1 tablet by mouth Daily. Sometimes takes 2 per day as instructed, Disp: , Rfl:     diphenhydrAMINE (BENADRYL) 25 MG tablet, Take 1 tablet by mouth Every 6 (Six) Hours As Needed., Disp: , Rfl:     diphenoxylate-atropine (LOMOTIL) 2.5-0.025 MG per tablet, TAKE 1 TABLET BY  MOUTH EVERY 6 HOURS AS NEEDED FOR DIARRHEA, Disp: , Rfl:     ibuprofen (ADVIL,MOTRIN) 800 MG tablet, Take 1 tablet by mouth Every 8 (Eight) Hours. Stop now for procedure, Disp: , Rfl:     Loperamide HCl (IMODIUM PO), 1 dose Daily As Needed., Disp: , Rfl:     meclizine (ANTIVERT) 12.5 MG tablet, 1-2 tabs PO TID PRN dizziness, Disp: 60 tablet, Rfl: 1    Meth-Hyo-M Bl-Na Phos-Ph Sal (Uro-MP) 118 MG capsule, Take 1 capsule by mouth Every 8 (Eight) Hours., Disp: , Rfl:     montelukast (SINGULAIR) 10 MG tablet, Take 1 tablet by mouth Every Night., Disp: , Rfl:     omeprazole (priLOSEC) 40 MG capsule, Take 1 capsule by mouth Daily., Disp: , Rfl:     ondansetron ODT (ZOFRAN-ODT) 4 MG disintegrating tablet, DISSOLVE 1 TABLET UNDER THE TONGUE TWICE DAILY AS NEEDED FOR NAUSEA OR VOMITING, Disp: , Rfl:     pantoprazole (PROTONIX) 40 MG EC tablet, Take 1 tablet by mouth 2 (Two) Times a Day., Disp: , Rfl:     promethazine (PHENERGAN) 25 MG tablet, TAKE 1 TABLET BY MOUTH EVERY 4 TO 6 HOURS AS NEEDED FOR NAUSEA AND VOMITING, Disp: , Rfl:     sildenafil (REVATIO) 20 MG tablet, Take 1 tablet by mouth Daily As Needed. Do not use 24 hours prior to procedure, Disp: , Rfl: 99    sildenafil (VIAGRA) 100 MG tablet, Take 1 tablet by mouth., Disp: , Rfl:     sildenafil (VIAGRA) 25 MG tablet, Take 1 tablet by mouth See Admin Instructions., Disp: , Rfl:     Viberzi 100 MG tablet, Take 1 tablet by mouth 2 (Two) Times a Day., Disp: , Rfl:     Xifaxan 550 MG tablet, TAKE 1 TABLET BY MOUTH THREE TIMES DAILY FOR 14 DAYS FOR IRRITABLE BOWEL SYNDROME, Disp: , Rfl:     Allergies   Allergen Reactions    Amoxicillin Unknown (See Comments)    Erythromycin Hives    Levofloxacin Hives    Penicillin G Unknown (See Comments)    Penicillins Other (See Comments)       Family History   Problem Relation Age of Onset    Diabetes Mother     Vision loss Mother     COPD Paternal Aunt     Vision loss Paternal Aunt     Diabetes Maternal Grandmother     Vision loss  Maternal Grandmother     Cancer Maternal Grandmother     Heart disease Paternal Grandmother     Stroke Paternal Grandmother     Heart disease Paternal Grandfather     Vision loss Paternal Grandfather     Vision loss Father     Vision loss Sister        Cancer-related family history includes Cancer in his maternal grandmother.    Social History     Tobacco Use    Smoking status: Every Day     Packs/day: 1.00     Years: 17.00     Additional pack years: 0.00     Total pack years: 17.00     Types: Cigarettes, Cigars, Electronic Cigarette     Start date: 8/2/2002    Smokeless tobacco: Never   Substance Use Topics    Alcohol use: Not Currently    Drug use: Not Currently       ROS:     Objective:  Vital signs:  There were no vitals filed for this visit.    ECOG  (0) Fully active, able to carry on all predisease performance without restriction    Physical Exam:   Physical Exam   Constitutional: He is oriented to person, place, and time. He appears well-developed. No distress.   HENT:   Head: Normocephalic and atraumatic.   Eyes: Pupils are equal, round, and reactive to light. Conjunctivae are normal.   Neck: No thyromegaly present.   Cardiovascular: Normal rate, regular rhythm, normal heart sounds and normal pulses. Exam reveals no gallop and no friction rub.   Pulmonary/Chest: Effort normal and breath sounds normal.   Slightly decreased air entry bilaterally   Abdominal: Soft. Normal appearance and bowel sounds are normal.   Musculoskeletal: Normal range of motion. No tenderness.      Comments: Healing surgical scar noted on the right knee   Lymphadenopathy:     He has no cervical adenopathy.   Neurological: He is alert and oriented to person, place, and time. He exhibits normal muscle tone.   Skin: Skin is warm. No rash noted. He is not diaphoretic. No erythema.   Multiple tattoos   Psychiatric: His behavior is normal. Mood normal.       Lab Results - Last 18 Months   Lab Units 01/17/24  0940 07/06/23  0832 04/04/23  0815  "  WBC 10*3/mm3 9.17 6.11 8.18   HEMOGLOBIN g/dL 17.4 16.5 16.5   HEMATOCRIT % 49.0 45.9 46.1   PLATELETS 10*3/mm3 220 208 197   MCV fL 95.7 94.6 94.7     Lab Results - Last 18 Months   Lab Units 01/17/24  0940 07/06/23  0832 04/11/23  0922   SODIUM mmol/L 136 138 137   POTASSIUM mmol/L 3.9 3.7 4.2   CHLORIDE mmol/L 100 105 103   CO2 mmol/L 26.0 22.0 26.0   BUN mg/dL 17 13 8   CREATININE mg/dL 1.01 0.97 0.90   CALCIUM mg/dL 9.7 9.3 9.5   BILIRUBIN mg/dL 1.2 0.8 1.2   ALK PHOS U/L 68 61 69   ALT (SGPT) U/L 58* 27 25   AST (SGOT) U/L 34 21 20   GLUCOSE mg/dL 129* 96 143*       Lab Results   Component Value Date    GLUCOSE 129 (H) 01/17/2024    BUN 17 01/17/2024    CREATININE 1.01 01/17/2024    EGFRIFNONA 90 12/03/2021    BCR 16.8 01/17/2024    K 3.9 01/17/2024    CO2 26.0 01/17/2024    CALCIUM 9.7 01/17/2024    PROTENTOTREF 8.9 (H) 01/17/2024    ALBUMIN 4.7 01/17/2024    ALBUMIN 4.6 (H) 01/17/2024    LABIL2 1.1 01/17/2024    AST 34 01/17/2024    ALT 58 (H) 01/17/2024       No results for input(s): \"APTT\", \"INR\", \"PTT\" in the last 09330 hours.    Lab Results   Component Value Date    IRON 130 12/13/2019    TIBC 346 12/13/2019    FERRITIN 34.35 12/13/2019       No results found for: \"FOLATE\"    No results found for: \"OCCULTBLD\"    No results found for: \"RETICCTPCT\"    Lab Results   Component Value Date    XCPZUGLR87 319 06/13/2019     Lab Results   Component Value Date    SPEP Comment 04/11/2022     No results found for: \"LDH\", \"URICACID\"  Lab Results   Component Value Date    SEDRATE 3 11/05/2019     No results found for: \"FIBRINOGEN\", \"HAPTOGLOBIN\"  Lab Results   Component Value Date    PTT 24.9 01/17/2020    INR 1.04 01/17/2020     No results found for: \"\"  No results found for: \"CEA\"  No components found for: \"CA-19-9\"  No results found for: \"PSA\"      Assessment & Plan     Assessment:   High risk IgG kappa MGUS: Diagnosed November 2019.  High-intermediate risk.  PET CT scan on 8/21/2020 does not show any " evidence of bone lesions..  No crab symptoms.  M protein was 1.8 initially,   Skeletal survey was negative.  He does not report any frequent infections..  Bone marrow biopsy shows about 5 to 10% plasma cells.  Cytogenetics showed normal karyotype.  They were unable to run the FISH panel.  Urine immunofixation is also positive.  M protein remains stable as of now. Has had some EKG changes, was seen by cardiology for preop. Was told he is asymptomatic but that does concern me about his symptoms with chest pain, dizziness, I recommended that he see cardiologist about this but he is reluctant and wants to talk to his cardiologist. This does bring about a question of cardiac amyloid as a possibility. Will check proBNP, ECHO, trop, follow up with PCP  History of elevated liver enzymes: ALT is up to 193 and AST up to 602 as of 3/11/2020.  Patient is following with gastroenterology.   Status post liver biopsy which did not reveal any pathology.  Repeat LFTs have normalized.   History of secondary polycythemia: Seems resolved. Bone marrow biopsy was unremarkable.  JAK2 mutation and EPO  level do not suggest any evidence of polycythemia vera.  He has secondary polycythemia likely due  to tobacco use and from sleep apnea.  The patient continues to smoke.  Patient did not want to  have a sleep study.  He has not required phlebotomy for the last 3-4 years His hemoglobin has stabilized now  Fatigue: TSH cortisol WNL.   Vertigo : Takes Meclizine. Has seen ENT.  History of Matson’s esophagus.  He follows up with Dr. Hernandez.  Will need surveillance endoscopies. Has had 2 endoscopies he states at [facility].  increasing symptoms now. Will follow up with Dr. Hernandez.  History of tobacco use: has restarted smoking.    PLAN:  Cardiac workup as above, if negative, will repeat myeloma panel in 6 months.   Continue follow-up with gastroenterology for Matson's esophagus, diarrhea,     There are no diagnoses linked to this encounter.  No  orders of the defined types were placed in this encounter.

## 2024-01-24 ENCOUNTER — LAB (OUTPATIENT)
Dept: LAB | Facility: HOSPITAL | Age: 39
End: 2024-01-24
Payer: COMMERCIAL

## 2024-01-24 ENCOUNTER — APPOINTMENT (OUTPATIENT)
Dept: LAB | Facility: HOSPITAL | Age: 39
End: 2024-01-24
Payer: COMMERCIAL

## 2024-01-24 ENCOUNTER — OFFICE VISIT (OUTPATIENT)
Dept: ONCOLOGY | Facility: CLINIC | Age: 39
End: 2024-01-24
Payer: COMMERCIAL

## 2024-01-24 VITALS
WEIGHT: 245.6 LBS | SYSTOLIC BLOOD PRESSURE: 136 MMHG | DIASTOLIC BLOOD PRESSURE: 86 MMHG | TEMPERATURE: 97.2 F | OXYGEN SATURATION: 98 % | RESPIRATION RATE: 16 BRPM | HEART RATE: 86 BPM | HEIGHT: 74 IN | BODY MASS INDEX: 31.52 KG/M2

## 2024-01-24 DIAGNOSIS — D47.2 MGUS (MONOCLONAL GAMMOPATHY OF UNKNOWN SIGNIFICANCE): Primary | ICD-10-CM

## 2024-01-24 DIAGNOSIS — D47.2 MONOCLONAL GAMMOPATHY: ICD-10-CM

## 2024-01-24 LAB
NT-PROBNP SERPL-MCNC: <36 PG/ML (ref 0–450)
TROPONIN T SERPL HS-MCNC: 12 NG/L

## 2024-01-24 PROCEDURE — 36415 COLL VENOUS BLD VENIPUNCTURE: CPT | Performed by: INTERNAL MEDICINE

## 2024-01-24 PROCEDURE — 83880 ASSAY OF NATRIURETIC PEPTIDE: CPT | Performed by: INTERNAL MEDICINE

## 2024-01-24 PROCEDURE — 84484 ASSAY OF TROPONIN QUANT: CPT | Performed by: INTERNAL MEDICINE

## 2024-01-31 ENCOUNTER — HOSPITAL ENCOUNTER (OUTPATIENT)
Dept: CARDIOLOGY | Facility: HOSPITAL | Age: 39
Discharge: HOME | End: 2024-01-31
Admitting: INTERNAL MEDICINE
Payer: COMMERCIAL

## 2024-01-31 VITALS
HEART RATE: 91 BPM | WEIGHT: 245 LBS | BODY MASS INDEX: 31.44 KG/M2 | SYSTOLIC BLOOD PRESSURE: 129 MMHG | HEIGHT: 74 IN | DIASTOLIC BLOOD PRESSURE: 92 MMHG

## 2024-01-31 DIAGNOSIS — D47.2 MGUS (MONOCLONAL GAMMOPATHY OF UNKNOWN SIGNIFICANCE): ICD-10-CM

## 2024-01-31 DIAGNOSIS — D47.2 MONOCLONAL GAMMOPATHY: ICD-10-CM

## 2024-01-31 LAB
BH CV ECHO MEAS - ACS: 2.18 CM
BH CV ECHO MEAS - AO MAX PG: 5.9 MMHG
BH CV ECHO MEAS - AO MEAN PG: 3.3 MMHG
BH CV ECHO MEAS - AO ROOT DIAM: 3.5 CM
BH CV ECHO MEAS - AO V2 MAX: 121.2 CM/SEC
BH CV ECHO MEAS - AO V2 VTI: 19.4 CM
BH CV ECHO MEAS - AVA(I,D): 3.5 CM2
BH CV ECHO MEAS - EDV(CUBED): 151.4 ML
BH CV ECHO MEAS - EDV(MOD-SP2): 116.4 ML
BH CV ECHO MEAS - EDV(MOD-SP4): 131.3 ML
BH CV ECHO MEAS - EF(MOD-BP): 64.6 %
BH CV ECHO MEAS - EF(MOD-SP2): 58.9 %
BH CV ECHO MEAS - EF(MOD-SP4): 64.6 %
BH CV ECHO MEAS - ESV(CUBED): 45.2 ML
BH CV ECHO MEAS - ESV(MOD-SP2): 47.8 ML
BH CV ECHO MEAS - ESV(MOD-SP4): 46.5 ML
BH CV ECHO MEAS - FS: 33.2 %
BH CV ECHO MEAS - IVS/LVPW: 0.98 CM
BH CV ECHO MEAS - IVSD: 1.09 CM
BH CV ECHO MEAS - LA DIMENSION: 4.4 CM
BH CV ECHO MEAS - LV DIASTOLIC VOL/BSA (35-75): 55.4 CM2
BH CV ECHO MEAS - LV MASS(C)D: 230.6 GRAMS
BH CV ECHO MEAS - LV MAX PG: 4 MMHG
BH CV ECHO MEAS - LV MEAN PG: 2 MMHG
BH CV ECHO MEAS - LV SYSTOLIC VOL/BSA (12-30): 19.6 CM2
BH CV ECHO MEAS - LV V1 MAX: 100 CM/SEC
BH CV ECHO MEAS - LV V1 VTI: 18.1 CM
BH CV ECHO MEAS - LVIDD: 5.3 CM
BH CV ECHO MEAS - LVIDS: 3.6 CM
BH CV ECHO MEAS - LVOT AREA: 3.7 CM2
BH CV ECHO MEAS - LVOT DIAM: 2.18 CM
BH CV ECHO MEAS - LVPWD: 1.11 CM
BH CV ECHO MEAS - MR MAX PG: 13.4 MMHG
BH CV ECHO MEAS - MR MAX VEL: 183.1 CM/SEC
BH CV ECHO MEAS - MV A MAX VEL: 64.5 CM/SEC
BH CV ECHO MEAS - MV DEC SLOPE: 319.7 CM/SEC2
BH CV ECHO MEAS - MV DEC TIME: 0.2 SEC
BH CV ECHO MEAS - MV E MAX VEL: 63.3 CM/SEC
BH CV ECHO MEAS - MV E/A: 0.98
BH CV ECHO MEAS - MV MAX PG: 1.84 MMHG
BH CV ECHO MEAS - MV MEAN PG: 0.92 MMHG
BH CV ECHO MEAS - MV V2 VTI: 15 CM
BH CV ECHO MEAS - MVA(VTI): 4.5 CM2
BH CV ECHO MEAS - PA ACC TIME: 0.15 SEC
BH CV ECHO MEAS - PA V2 MAX: 94.6 CM/SEC
BH CV ECHO MEAS - PI END-D VEL: 83.5 CM/SEC
BH CV ECHO MEAS - PULM A REVS DUR: 0.13 SEC
BH CV ECHO MEAS - PULM A REVS VEL: 46.7 CM/SEC
BH CV ECHO MEAS - PULM DIAS VEL: 36.2 CM/SEC
BH CV ECHO MEAS - PULM S/D: 1
BH CV ECHO MEAS - PULM SYS VEL: 36.2 CM/SEC
BH CV ECHO MEAS - QP/QS: 2.06
BH CV ECHO MEAS - RV MAX PG: 1.47 MMHG
BH CV ECHO MEAS - RV V1 MAX: 60.6 CM/SEC
BH CV ECHO MEAS - RV V1 VTI: 12.6 CM
BH CV ECHO MEAS - RVDD: 3.8 CM
BH CV ECHO MEAS - RVOT DIAM: 3.7 CM
BH CV ECHO MEAS - SI(MOD-SP2): 28.9 ML/M2
BH CV ECHO MEAS - SI(MOD-SP4): 35.8 ML/M2
BH CV ECHO MEAS - SV(LVOT): 67.4 ML
BH CV ECHO MEAS - SV(MOD-SP2): 68.6 ML
BH CV ECHO MEAS - SV(MOD-SP4): 84.8 ML
BH CV ECHO MEAS - SV(RVOT): 138.5 ML
BH CV ECHO MEAS - TR MAX PG: 15 MMHG
BH CV ECHO MEAS - TR MAX VEL: 193.1 CM/SEC

## 2024-01-31 PROCEDURE — 93306 TTE W/DOPPLER COMPLETE: CPT

## 2024-01-31 PROCEDURE — 93306 TTE W/DOPPLER COMPLETE: CPT | Performed by: INTERNAL MEDICINE

## 2024-01-31 PROCEDURE — 93356 MYOCRD STRAIN IMG SPCKL TRCK: CPT

## 2024-01-31 PROCEDURE — 93356 MYOCRD STRAIN IMG SPCKL TRCK: CPT | Performed by: INTERNAL MEDICINE

## (undated) DEVICE — PK ENDO GI 50

## (undated) DEVICE — BITEBLOCK ENDO W/STRAP 60F A/ LF DISP

## (undated) DEVICE — PAPR PRNT PK SONY W RIBN UPC55

## (undated) DEVICE — SINGLE-USE BIOPSY FORCEPS: Brand: RADIAL JAW 4